# Patient Record
Sex: FEMALE | Race: WHITE | Employment: PART TIME | URBAN - METROPOLITAN AREA
[De-identification: names, ages, dates, MRNs, and addresses within clinical notes are randomized per-mention and may not be internally consistent; named-entity substitution may affect disease eponyms.]

---

## 2017-01-06 ENCOUNTER — ANESTHESIA EVENT (OUTPATIENT)
Dept: SURGERY | Age: 72
End: 2017-01-06
Payer: MEDICARE

## 2017-01-06 ENCOUNTER — ANESTHESIA (OUTPATIENT)
Dept: SURGERY | Age: 72
End: 2017-01-06
Payer: MEDICARE

## 2017-01-06 PROCEDURE — 74011250636 HC RX REV CODE- 250/636

## 2017-01-06 PROCEDURE — 74011250636 HC RX REV CODE- 250/636: Performed by: PODIATRIST

## 2017-01-06 PROCEDURE — 74011000250 HC RX REV CODE- 250

## 2017-01-06 RX ORDER — MIDAZOLAM HYDROCHLORIDE 1 MG/ML
INJECTION, SOLUTION INTRAMUSCULAR; INTRAVENOUS AS NEEDED
Status: DISCONTINUED | OUTPATIENT
Start: 2017-01-06 | End: 2017-01-06 | Stop reason: HOSPADM

## 2017-01-06 RX ORDER — LIDOCAINE HYDROCHLORIDE 20 MG/ML
INJECTION, SOLUTION EPIDURAL; INFILTRATION; INTRACAUDAL; PERINEURAL AS NEEDED
Status: DISCONTINUED | OUTPATIENT
Start: 2017-01-06 | End: 2017-01-06 | Stop reason: HOSPADM

## 2017-01-06 RX ORDER — PROPOFOL 10 MG/ML
INJECTION, EMULSION INTRAVENOUS
Status: DISCONTINUED | OUTPATIENT
Start: 2017-01-06 | End: 2017-01-06 | Stop reason: HOSPADM

## 2017-01-06 RX ORDER — PROPOFOL 10 MG/ML
INJECTION, EMULSION INTRAVENOUS AS NEEDED
Status: DISCONTINUED | OUTPATIENT
Start: 2017-01-06 | End: 2017-01-06 | Stop reason: HOSPADM

## 2017-01-06 RX ORDER — FENTANYL CITRATE 50 UG/ML
INJECTION, SOLUTION INTRAMUSCULAR; INTRAVENOUS AS NEEDED
Status: DISCONTINUED | OUTPATIENT
Start: 2017-01-06 | End: 2017-01-06 | Stop reason: HOSPADM

## 2017-01-06 RX ADMIN — CEFAZOLIN 2 G: 10 INJECTION, POWDER, FOR SOLUTION INTRAVENOUS; PARENTERAL at 11:05

## 2017-01-06 RX ADMIN — MIDAZOLAM HYDROCHLORIDE 2 MG: 1 INJECTION, SOLUTION INTRAMUSCULAR; INTRAVENOUS at 11:05

## 2017-01-06 RX ADMIN — PROPOFOL 100 MCG/KG/MIN: 10 INJECTION, EMULSION INTRAVENOUS at 11:20

## 2017-01-06 RX ADMIN — PROPOFOL 150 MG: 10 INJECTION, EMULSION INTRAVENOUS at 11:20

## 2017-01-06 RX ADMIN — FENTANYL CITRATE 50 MCG: 50 INJECTION, SOLUTION INTRAMUSCULAR; INTRAVENOUS at 11:05

## 2017-01-06 RX ADMIN — MIDAZOLAM HYDROCHLORIDE 1 MG: 1 INJECTION, SOLUTION INTRAMUSCULAR; INTRAVENOUS at 11:17

## 2017-01-06 RX ADMIN — LIDOCAINE HYDROCHLORIDE 40 MG: 20 INJECTION, SOLUTION EPIDURAL; INFILTRATION; INTRACAUDAL; PERINEURAL at 11:20

## 2017-01-06 RX ADMIN — MIDAZOLAM HYDROCHLORIDE 1 MG: 1 INJECTION, SOLUTION INTRAMUSCULAR; INTRAVENOUS at 11:15

## 2017-01-06 NOTE — ANESTHESIA PREPROCEDURE EVALUATION
Anesthetic History   No history of anesthetic complications            Review of Systems / Medical History  Patient summary reviewed, nursing notes reviewed and pertinent labs reviewed    Pulmonary    COPD               Neuro/Psych   Within defined limits           Cardiovascular  Within defined limits                Exercise tolerance: >4 METS     GI/Hepatic/Renal  Within defined limits              Endo/Other      Hypothyroidism       Other Findings   Comments: Deformity toes         Physical Exam    Airway  Mallampati: II  TM Distance: 4 - 6 cm  Neck ROM: normal range of motion   Mouth opening: Normal     Cardiovascular  Regular rate and rhythm,  S1 and S2 normal,  no murmur, click, rub, or gallop             Dental  No notable dental hx       Pulmonary  Breath sounds clear to auscultation               Abdominal  GI exam deferred       Other Findings            Anesthetic Plan    ASA: 2  Anesthesia type: general and total IV anesthesia    Monitoring Plan: BIS      Induction: Intravenous  Anesthetic plan and risks discussed with: Patient

## 2017-01-06 NOTE — ANESTHESIA POSTPROCEDURE EVALUATION
Post-Anesthesia Evaluation and Assessment    Patient: Mick Spence MRN: [id number]  SSN: [id number]    YOB: 1945  Age: 73 y.o. Sex: female       Cardiovascular Function/Vital Signs  Visit Vitals    /64    Pulse 66    Temp 36.7 °C (98 °F)    Resp 12    Ht 5' 1\" (1.549 m)    Wt 69.8 kg (153 lb 14.1 oz)    SpO2 92%    BMI 29.08 kg/m2       Patient is status post MAC, total IV anesthesia anesthesia for Procedure(s):  RIGHT FITH TOE DEROTATIONAL ARTHROPLASTY (MAC W/LOCAL). Nausea/Vomiting: None    Postoperative hydration reviewed and adequate. Pain:  Pain Scale 1: Numeric (0 - 10) (01/06/17 1045)  Pain Intensity 1: 0 (01/06/17 1045)   Managed    Neurological Status:   Neuro (WDL): Within Defined Limits (01/06/17 1025)   At baseline    Mental Status and Level of Consciousness: Arousable    Pulmonary Status:   O2 Device: Nasal cannula (01/06/17 1157)   Adequate oxygenation and airway patent    Complications related to anesthesia: None    Post-anesthesia assessment completed.  No concerns    Signed By: Yulisa Carmona MD     January 6, 2017

## 2018-11-01 ENCOUNTER — OFFICE VISIT (OUTPATIENT)
Dept: SURGERY | Age: 73
End: 2018-11-01

## 2018-11-01 VITALS
BODY MASS INDEX: 29.57 KG/M2 | WEIGHT: 156.6 LBS | DIASTOLIC BLOOD PRESSURE: 72 MMHG | HEIGHT: 61 IN | SYSTOLIC BLOOD PRESSURE: 141 MMHG | HEART RATE: 70 BPM

## 2018-11-01 DIAGNOSIS — Z86.010 HX OF COLONIC POLYP: Primary | ICD-10-CM

## 2018-11-02 RX ORDER — SODIUM CHLORIDE, SODIUM LACTATE, POTASSIUM CHLORIDE, CALCIUM CHLORIDE 600; 310; 30; 20 MG/100ML; MG/100ML; MG/100ML; MG/100ML
200 INJECTION, SOLUTION INTRAVENOUS CONTINUOUS
Status: CANCELLED | OUTPATIENT
Start: 2018-11-02 | End: 2018-11-03

## 2018-11-02 RX ORDER — BISACODYL 5 MG
5 TABLET, DELAYED RELEASE (ENTERIC COATED) ORAL
Qty: 1 TAB | Refills: 0 | Status: SHIPPED | OUTPATIENT
Start: 2018-11-02 | End: 2018-11-02

## 2018-11-02 RX ORDER — POLYETHYLENE GLYCOL 3350, SODIUM CHLORIDE, SODIUM BICARBONATE, POTASSIUM CHLORIDE 420; 11.2; 5.72; 1.48 G/4L; G/4L; G/4L; G/4L
4000 POWDER, FOR SOLUTION ORAL
Qty: 1 BOTTLE | Refills: 0 | Status: SHIPPED | OUTPATIENT
Start: 2018-11-02 | End: 2018-11-02

## 2018-11-02 NOTE — PROGRESS NOTES
[address] [facility], [facility]  Phone: [phone] Fax: [phone]                                           HISTORY AND PHYSICAL EXAM    NAME: Gerson Milligan  : 1945    Chief Complaint:   HISTORY OF COLON POLYPS    History: Gerson Milligan is a 72 y.o. female referred for colonoscopy. This is  the patient's second colonoscopy. The previous one showed polyps. The last colonoscopy was in . The bowel movements are regular and brown. She does not use any meds on a regular basis for her bowels. She denies any blood in stools or hemorrhoidal disease. Family history is negative for colon cancer or colon polyps. Past Medical History:   Diagnosis Date    COPD (chronic obstructive pulmonary disease) ([address]     does not use oxygen, \"mild COPD\" per pt, CXR states otherwise    Menopause     Thyroid disease     hypothryoid     Past Surgical History:   Procedure Laterality Date    HX CHOLECYSTECTOMY      HX COLONOSCOPY      polyps x2    HX TONSILLECTOMY      HX TUBAL LIGATION          Current Outpatient Medications   Medication Sig Dispense Refill    bisacodyl (DULCOLAX, BISACODYL,) 5 mg EC tablet Take 1 Tab by mouth now for 1 dose. 1 Tab 0    peg-electrolyte soln (GAVILYTE-N) 420 gram solution Take 4,000 mL by mouth now for 1 dose. 1 Bottle 0    budesonide-formoterol (SYMBICORT) 160-4.5 mcg/actuation HFA inhaler Take 2 Puffs by inhalation two (2) times a day.  montelukast (SINGULAIR) 10 mg tablet Take 10 mg by mouth daily.  calcium-vitamin D (OYSTER SHELL) 500 mg(1,250mg) -200 unit per tablet Take 1 Tab by mouth two (2) times daily (with meals).  TURMERIC ROOT EXTRACT PO Take  by mouth.  ascorbic acid, vitamin C, (VITAMIN C) 500 mg tablet Take  by mouth.  QUERCETIN DIHYDRATE, BULK, by Does Not Apply route.  cyanocobalamin (VITAMIN B12) 100 mcg tablet Take 100 mcg by mouth daily.       citalopram (CELEXA) 10 mg tablet Take 10 mg by mouth daily. 10    levothyroxine (SYNTHROID) 88 mcg tablet Take  by mouth Daily (before breakfast).  HYDROcodone-acetaminophen (NORCO) 5-325 mg per tablet Take 1 Tab by mouth every four (4) hours as needed for Pain. Max Daily Amount: 6 Tabs. 50 Tab 0     No Known Allergies  Social History     Socioeconomic History    Marital status: SINGLE     Spouse name: Not on file    Number of children: Not on file    Years of education: Not on file    Highest education level: Not on file   Social Needs    Financial resource strain: Not on file    Food insecurity - worry: Not on file    Food insecurity - inability: Not on file    Transportation needs - medical: Not on file   [employer] needs - non-medical: Not on file   Occupational History    Not on file   Tobacco Use    Smoking status: Former Smoker     Types: Cigarettes    Smokeless tobacco: Never Used   Substance and Sexual Activity    Alcohol use: Yes    Drug use: No    Sexual activity: Not on file   Other Topics Concern    Not on file   Social History Narrative    Not on file     Family History   Problem Relation Age of Onset    Diabetes Mother     Breast Cancer Maternal Aunt     Breast Cancer Paternal Aunt        Patient Active Problem List   Diagnosis Code    Blood in stool, josé miguel K92.1       Review of Systems:  Review of Systems   Constitutional: Negative for chills, fever, malaise/fatigue and weight loss. HENT: Negative for congestion, ear discharge, ear pain, hearing loss, nosebleeds, sore throat and tinnitus. Eyes: Negative for blurred vision, double vision, pain, discharge and redness. Respiratory: Positive for shortness of breath. Negative for cough, sputum production and wheezing. Cardiovascular: Negative for chest pain, palpitations and leg swelling. Gastrointestinal: Positive for heartburn. Negative for abdominal pain, blood in stool, constipation, diarrhea, melena, nausea and vomiting.    Genitourinary: Negative for frequency, hematuria and urgency. Musculoskeletal: Negative for back pain, joint pain and neck pain. Skin: Negative for itching and rash. Neurological: Negative for dizziness, tremors, focal weakness, seizures, weakness and headaches. Endo/Heme/Allergies: Bruises/bleeds easily. Psychiatric/Behavioral: Negative for depression and memory loss. The patient is not nervous/anxious and does not have insomnia. Physical Exam:  Visit Vitals  /72 (BP 1 Location: Left arm, BP Patient Position: Sitting)   Pulse 70   Ht 5' 1\" (1.549 m)   Wt 156 lb 9.6 oz (71 kg)   BMI 29.59 kg/m²       Physical Exam   Constitutional: She is oriented to person, place, and time. She appears well-developed and well-nourished. HENT:   Head: Normocephalic and atraumatic. Right Ear: External ear normal.   Left Ear: External ear normal.   Nose: Nose normal.   Mouth/Throat: Oropharynx is clear and moist. No oropharyngeal exudate. Eyes: Conjunctivae and EOM are normal. Pupils are equal, round, and reactive to light. Right eye exhibits no discharge. Left eye exhibits no discharge. No scleral icterus. Neck: Neck supple. No tracheal deviation present. No thyromegaly present. Cardiovascular: Normal rate, regular rhythm and normal heart sounds. Exam reveals no gallop and no friction rub. No murmur heard. Pulmonary/Chest: Effort normal and breath sounds normal.   Abdominal: Soft. Bowel sounds are normal. She exhibits no mass. There is no tenderness. Musculoskeletal: Normal range of motion. Lymphadenopathy:     She has no cervical adenopathy. Neurological: She is alert and oriented to person, place, and time. Skin: Skin is warm and dry. No rash noted. No erythema. Psychiatric: She has a normal mood and affect.  Her behavior is normal. Judgment and thought content normal.       Impression:  History of colon polyps    Plan:  Colonoscopy on 16/2/38  Risks and complications were explained to patient and they voiced understanding.     Karon Arias M.D.

## 2018-12-03 PROBLEM — Z86.010 HX OF COLONIC POLYP: Status: ACTIVE | Noted: 2018-12-03

## 2018-12-03 PROBLEM — K57.30 DIVERTICULA OF COLON: Status: ACTIVE | Noted: 2018-12-03

## 2018-12-03 PROBLEM — K63.5 COLON POLYP: Status: ACTIVE | Noted: 2018-12-03

## 2018-12-03 LAB — COLONOSCOPY, EXTERNAL: NORMAL

## 2018-12-11 ENCOUNTER — OFFICE VISIT (OUTPATIENT)
Dept: SURGERY | Age: 73
End: 2018-12-11

## 2018-12-11 VITALS
BODY MASS INDEX: 29.45 KG/M2 | WEIGHT: 156 LBS | HEART RATE: 63 BPM | SYSTOLIC BLOOD PRESSURE: 154 MMHG | HEIGHT: 61 IN | DIASTOLIC BLOOD PRESSURE: 67 MMHG

## 2018-12-11 DIAGNOSIS — K63.5 SERRATED POLYP OF COLON: ICD-10-CM

## 2018-12-11 DIAGNOSIS — D12.4 ADENOMATOUS POLYP OF DESCENDING COLON: Primary | ICD-10-CM

## 2018-12-11 NOTE — PROGRESS NOTES
Follow up from colonoscopy  No c/o      Pathology: serrated sessile Adenomatous polyp  Diverticula    Visit Vitals  /67 (BP 1 Location: Left arm, BP Patient Position: Sitting)   Pulse 63   Ht 5' 1\" (1.549 m)   Wt 156 lb (70.8 kg)   BMI 29.48 kg/m²     WDWN patient in no acute distress. Pathophysiology and malignancy potential discussed. Pictures viewed and questions answered. High fiber diet suggested. Time spent 15 minutes in discussion, over half the visit was in care coordination and patient counseling. Repeat scope in 3 years because of size and serrated polyp.

## 2018-12-11 NOTE — LETTER
12/11/2018 4:55 PM 
 
Patient:  Francois Strauss YOB: 1945 Date of Visit: 12/11/2018 Dear Stan Maza MD 
[address] [facility] [address] [id number] V[address] Facsimile: [phone] 
 : Thank you for referring Ms. Jackelyn Vega to me for evaluation/treatment. Below are the relevant portions of my assessment and plan of care. Follow up from colonoscopy No c/o Pathology: serrated sessile Adenomatous polyp Diverticula Visit Vitals /67 (BP 1 Location: Left arm, BP Patient Position: Sitting) Pulse 63 Ht 5' 1\" (1.549 m) Wt 156 lb (70.8 kg) BMI 29.48 kg/m² WDWN patient in no acute distress. Pathophysiology and malignancy potential discussed. Pictures viewed and questions answered. High fiber diet suggested. Time spent 15 minutes in discussion, over half the visit was in care coordination and patient counseling. Repeat scope in 3 years because of size and serrated polyp. If you have questions, please do not hesitate to call me. I look forward to following Ms. Britt Harrison along with you.  
 
 
 
Sincerely, 
 
 
Carol Up MD

## 2020-05-08 ENCOUNTER — HOSPITAL ENCOUNTER (OUTPATIENT)
Dept: CT IMAGING | Age: 75
Discharge: HOME | End: 2020-05-08
Attending: INTERNAL MEDICINE
Payer: MEDICARE

## 2020-05-08 DIAGNOSIS — J84.9 ILD (INTERSTITIAL LUNG DISEASE) (HCC): ICD-10-CM

## 2020-05-08 PROCEDURE — 71250 CT THORAX DX C-: CPT

## 2020-05-21 ENCOUNTER — HOSPITAL ENCOUNTER (OUTPATIENT)
Dept: NON INVASIVE DIAGNOSTICS | Age: 75
Discharge: HOME | End: 2020-05-21
Attending: INTERNAL MEDICINE
Payer: MEDICARE

## 2020-05-21 VITALS
SYSTOLIC BLOOD PRESSURE: 114 MMHG | DIASTOLIC BLOOD PRESSURE: 61 MMHG | WEIGHT: 154.98 LBS | HEIGHT: 61 IN | BODY MASS INDEX: 29.26 KG/M2

## 2020-05-21 DIAGNOSIS — J84.9 ILD (INTERSTITIAL LUNG DISEASE) (HCC): ICD-10-CM

## 2020-05-21 LAB
AV VELOCITY RATIO: 0.9
ECHO AV AREA PEAK VELOCITY: 3.5 CM2
ECHO AV PEAK GRADIENT: 4.9 MMHG
ECHO AV PEAK VELOCITY: 110.73 CM/S
ECHO EST RA PRESSURE: 10 MMHG
ECHO LA AREA 4C: 11.4 CM2
ECHO LA VOL 4C: 27.52 ML (ref 22–52)
ECHO LA VOLUME INDEX A4C: 16.24 ML/M2 (ref 16–28)
ECHO LV E' LATERAL VELOCITY: 7.47 CM/S
ECHO LV E' SEPTAL VELOCITY: 5.68 CM/S
ECHO LV EDV A4C: 52.4 ML
ECHO LV EDV INDEX A4C: 30.9 ML/M2
ECHO LV EJECTION FRACTION A4C: 77 %
ECHO LV ESV A4C: 12 ML
ECHO LV ESV INDEX A4C: 7.1 ML/M2
ECHO LV INTERNAL DIMENSION DIASTOLIC MMODE: 5.77 CM
ECHO LV INTERNAL DIMENSION SYSTOLIC MMODE: 3.6 CM
ECHO LV IVSD MMODE: 1.15 CM
ECHO LV IVSS MMODE: 1.79 CM
ECHO LV POSTERIOR WALL DIASTOLIC MMODE: 0.89 CM
ECHO LV POSTERIOR WALL SYSTOLIC MMODE: 1.76 CM
ECHO LVOT DIAM: 2.21 CM
ECHO LVOT PEAK GRADIENT: 4 MMHG
ECHO LVOT PEAK VELOCITY: 99.88 CM/S
ECHO MV A VELOCITY: 101.65 CM/S
ECHO MV E DECELERATION TIME (DT): 227.1 MS
ECHO MV E VELOCITY: 67.65 CM/S
ECHO MV E/A RATIO: 0.67
ECHO MV E/E' LATERAL: 9.06
ECHO MV E/E' RATIO (AVERAGED): 10.48
ECHO MV E/E' SEPTAL: 11.91
ECHO MV REGURGITANT PEAK GRADIENT: 13 MMHG
ECHO MV REGURGITANT PEAK VELOCITY: 180.2 CM/S
ECHO PULMONARY ARTERY SYSTOLIC PRESSURE (PASP): 46.3 MMHG
ECHO PV MAX VELOCITY: 74.97 CM/S
ECHO PV PEAK GRADIENT: 2.2 MMHG
ECHO RIGHT VENTRICULAR SYSTOLIC PRESSURE (RVSP): 46.3 MMHG
ECHO RV INTERNAL DIMENSION: 2.76 CM
ECHO TV A WAVE: 67.74 CM/S
ECHO TV E WAVE: 61.48 CM/S
ECHO TV EROA: 1.1
ECHO TV REGURGITANT MAX VELOCITY: 301.44 CM/S
ECHO TV REGURGITANT PEAK GRADIENT: 36.3 MMHG
LVFS: 37.5 %
MV DEC SLOPE: 2.98

## 2020-05-21 PROCEDURE — 93306 TTE W/DOPPLER COMPLETE: CPT

## 2021-04-15 RX ORDER — OMEPRAZOLE 40 MG/1
40 CAPSULE, DELAYED RELEASE ORAL DAILY
COMMUNITY

## 2021-04-15 RX ORDER — MAGNESIUM 200 MG
1000 TABLET ORAL DAILY
COMMUNITY

## 2021-04-15 RX ORDER — ASCORBIC ACID/MULTIVIT-MIN 1000 MG
1000 EFFERVESCENT POWDER IN PACKET ORAL DAILY
COMMUNITY

## 2021-04-15 RX ORDER — MULTIVIT WITH IRON,MINERALS
1 TABLET ORAL DAILY
COMMUNITY

## 2021-04-15 RX ORDER — MINERAL OIL
180 ENEMA (ML) RECTAL DAILY
COMMUNITY

## 2021-04-15 RX ORDER — ACETYLCYSTEINE 600 MG
600 CAPSULE ORAL DAILY
COMMUNITY

## 2021-04-15 RX ORDER — NINTEDANIB 150 MG/1
150 CAPSULE ORAL 2 TIMES DAILY
COMMUNITY

## 2021-04-15 RX ORDER — CITALOPRAM 20 MG/1
20 TABLET, FILM COATED ORAL DAILY
Status: ON HOLD | COMMUNITY
End: 2021-09-23

## 2021-04-18 ENCOUNTER — HOSPITAL ENCOUNTER (OUTPATIENT)
Dept: PREADMISSION TESTING | Age: 76
Discharge: HOME | End: 2021-04-18
Payer: MEDICARE

## 2021-04-18 ENCOUNTER — TRANSCRIBE ORDER (OUTPATIENT)
Dept: REGISTRATION | Age: 76
End: 2021-04-18

## 2021-04-18 DIAGNOSIS — Z01.812 PRE-PROCEDURE LAB EXAM: ICD-10-CM

## 2021-04-18 DIAGNOSIS — Z01.812 PRE-PROCEDURE LAB EXAM: Primary | ICD-10-CM

## 2021-04-18 PROCEDURE — U0003 INFECTIOUS AGENT DETECTION BY NUCLEIC ACID (DNA OR RNA); SEVERE ACUTE RESPIRATORY SYNDROME CORONAVIRUS 2 (SARS-COV-2) (CORONAVIRUS DISEASE [COVID-19]), AMPLIFIED PROBE TECHNIQUE, MAKING USE OF HIGH THROUGHPUT TECHNOLOGIES AS DESCRIBED BY CMS-2020-01-R: HCPCS

## 2021-04-19 LAB — SARS-COV-2, COV2NT: NOT DETECTED

## 2021-04-22 ENCOUNTER — HOSPITAL ENCOUNTER (OUTPATIENT)
Age: 76
Setting detail: OUTPATIENT SURGERY
Discharge: HOME | End: 2021-04-22
Attending: SPECIALIST | Admitting: SPECIALIST
Payer: MEDICARE

## 2021-04-22 ENCOUNTER — ANESTHESIA EVENT (OUTPATIENT)
Dept: ENDOSCOPY | Age: 76
End: 2021-04-22
Payer: MEDICARE

## 2021-04-22 ENCOUNTER — ANESTHESIA (OUTPATIENT)
Dept: ENDOSCOPY | Age: 76
End: 2021-04-22
Payer: MEDICARE

## 2021-04-22 VITALS
TEMPERATURE: 97.3 F | BODY MASS INDEX: 29.27 KG/M2 | HEIGHT: 61 IN | RESPIRATION RATE: 18 BRPM | SYSTOLIC BLOOD PRESSURE: 146 MMHG | HEART RATE: 62 BPM | DIASTOLIC BLOOD PRESSURE: 60 MMHG | WEIGHT: 155 LBS | OXYGEN SATURATION: 91 %

## 2021-04-22 PROCEDURE — 74011000250 HC RX REV CODE- 250: Performed by: NURSE ANESTHETIST, CERTIFIED REGISTERED

## 2021-04-22 PROCEDURE — 74011250636 HC RX REV CODE- 250/636: Performed by: NURSE ANESTHETIST, CERTIFIED REGISTERED

## 2021-04-22 PROCEDURE — 2709999900 HC NON-CHARGEABLE SUPPLY: Performed by: SPECIALIST

## 2021-04-22 PROCEDURE — 76060000032 HC ANESTHESIA 0.5 TO 1 HR: Performed by: SPECIALIST

## 2021-04-22 PROCEDURE — 74011250637 HC RX REV CODE- 250/637: Performed by: SPECIALIST

## 2021-04-22 PROCEDURE — 76040000007: Performed by: SPECIALIST

## 2021-04-22 RX ORDER — LIDOCAINE HYDROCHLORIDE 20 MG/ML
INJECTION, SOLUTION EPIDURAL; INFILTRATION; INTRACAUDAL; PERINEURAL AS NEEDED
Status: DISCONTINUED | OUTPATIENT
Start: 2021-04-22 | End: 2021-04-22 | Stop reason: HOSPADM

## 2021-04-22 RX ORDER — ATROPINE SULFATE 0.1 MG/ML
0.5 INJECTION INTRAVENOUS
Status: DISCONTINUED | OUTPATIENT
Start: 2021-04-22 | End: 2021-04-22 | Stop reason: HOSPADM

## 2021-04-22 RX ORDER — PHENYLEPHRINE HCL IN 0.9% NACL 0.4MG/10ML
SYRINGE (ML) INTRAVENOUS AS NEEDED
Status: DISCONTINUED | OUTPATIENT
Start: 2021-04-22 | End: 2021-04-22 | Stop reason: HOSPADM

## 2021-04-22 RX ORDER — NALOXONE HYDROCHLORIDE 0.4 MG/ML
0.4 INJECTION, SOLUTION INTRAMUSCULAR; INTRAVENOUS; SUBCUTANEOUS
Status: DISCONTINUED | OUTPATIENT
Start: 2021-04-22 | End: 2021-04-22 | Stop reason: HOSPADM

## 2021-04-22 RX ORDER — SODIUM CHLORIDE 9 MG/ML
50 INJECTION, SOLUTION INTRAVENOUS CONTINUOUS
Status: DISCONTINUED | OUTPATIENT
Start: 2021-04-22 | End: 2021-04-22 | Stop reason: HOSPADM

## 2021-04-22 RX ORDER — FLUMAZENIL 0.1 MG/ML
0.2 INJECTION INTRAVENOUS
Status: DISCONTINUED | OUTPATIENT
Start: 2021-04-22 | End: 2021-04-22 | Stop reason: HOSPADM

## 2021-04-22 RX ORDER — EPINEPHRINE 0.1 MG/ML
1 INJECTION INTRACARDIAC; INTRAVENOUS
Status: DISCONTINUED | OUTPATIENT
Start: 2021-04-22 | End: 2021-04-22 | Stop reason: HOSPADM

## 2021-04-22 RX ORDER — SODIUM CHLORIDE 9 MG/ML
INJECTION, SOLUTION INTRAVENOUS
Status: DISCONTINUED | OUTPATIENT
Start: 2021-04-22 | End: 2021-04-22 | Stop reason: HOSPADM

## 2021-04-22 RX ORDER — DEXTROMETHORPHAN/PSEUDOEPHED 2.5-7.5/.8
1.2 DROPS ORAL
Status: DISCONTINUED | OUTPATIENT
Start: 2021-04-22 | End: 2021-04-22 | Stop reason: HOSPADM

## 2021-04-22 RX ORDER — PROPOFOL 10 MG/ML
INJECTION, EMULSION INTRAVENOUS AS NEEDED
Status: DISCONTINUED | OUTPATIENT
Start: 2021-04-22 | End: 2021-04-22 | Stop reason: HOSPADM

## 2021-04-22 RX ORDER — SODIUM CHLORIDE 0.9 % (FLUSH) 0.9 %
5-40 SYRINGE (ML) INJECTION AS NEEDED
Status: DISCONTINUED | OUTPATIENT
Start: 2021-04-22 | End: 2021-04-22 | Stop reason: HOSPADM

## 2021-04-22 RX ORDER — SODIUM CHLORIDE 0.9 % (FLUSH) 0.9 %
5-40 SYRINGE (ML) INJECTION EVERY 8 HOURS
Status: DISCONTINUED | OUTPATIENT
Start: 2021-04-22 | End: 2021-04-22 | Stop reason: HOSPADM

## 2021-04-22 RX ADMIN — PHENYLEPHRINE HYDROCHLORIDE 80 MCG: 10 INJECTION INTRAVENOUS at 13:18

## 2021-04-22 RX ADMIN — PROPOFOL 20 MG: 10 INJECTION, EMULSION INTRAVENOUS at 13:18

## 2021-04-22 RX ADMIN — PROPOFOL 30 MG: 10 INJECTION, EMULSION INTRAVENOUS at 13:14

## 2021-04-22 RX ADMIN — PROPOFOL 30 MG: 10 INJECTION, EMULSION INTRAVENOUS at 13:10

## 2021-04-22 RX ADMIN — PROPOFOL 100 MG: 10 INJECTION, EMULSION INTRAVENOUS at 13:07

## 2021-04-22 RX ADMIN — PHENYLEPHRINE HYDROCHLORIDE 80 MCG: 10 INJECTION INTRAVENOUS at 13:13

## 2021-04-22 RX ADMIN — PROPOFOL 40 MG: 10 INJECTION, EMULSION INTRAVENOUS at 13:23

## 2021-04-22 RX ADMIN — PROPOFOL 30 MG: 10 INJECTION, EMULSION INTRAVENOUS at 13:16

## 2021-04-22 RX ADMIN — LIDOCAINE HYDROCHLORIDE 60 MG: 20 INJECTION, SOLUTION EPIDURAL; INFILTRATION; INTRACAUDAL; PERINEURAL at 13:07

## 2021-04-22 RX ADMIN — PHENYLEPHRINE HYDROCHLORIDE 80 MCG: 10 INJECTION INTRAVENOUS at 13:16

## 2021-04-22 RX ADMIN — SODIUM CHLORIDE: 900 INJECTION, SOLUTION INTRAVENOUS at 13:00

## 2021-04-22 RX ADMIN — PROPOFOL 20 MG: 10 INJECTION, EMULSION INTRAVENOUS at 13:20

## 2021-04-22 NOTE — PERIOP NOTES

## 2021-04-22 NOTE — PROCEDURES
[address], [address] [place] [address]                 Colonoscopy Procedure Note    Indications:   See Preoperative Diagnosis above  Referring Physician: Molly Levy MD  Anesthesia/Sedation: MAC anesthesia Propofol  Endoscopist:  Dr. Wolfgang Abdi  Assistant:  Endoscopy Technician-1: Sivakumar Dowd  Endoscopy RN-1: Maty Higuera RN  Preoperative diagnosis: Screening for malignant neoplasm of colon [Z12.11]  Idiopathic pulmonary fibrosis ([address] [J84.112]  Gastroesophageal reflux disease, unspecified whether esophagitis present [K21.9]  Postoperative diagnosis: diverticulosis    Procedure in Detail:  Informed consent was obtained for the procedure, including sedation. Risks of perforation, hemorrhage, adverse drug reaction, and aspiration were discussed. The patient was placed in the left lateral decubitus position. Based on the pre-procedure assessment, including review of the patient's medical history, medications, allergies, and review of systems, she had been deemed to be an appropriate candidate for moderate sedation; she was therefore sedated with the medications listed above. The patient was monitored continuously with ECG tracing, pulse oximetry, blood pressure monitoring, and direct observations. A rectal examination was performed. The [id number] was inserted into the rectum and advanced under direct vision to the cecum, which was identified by the ileocecal valve and appendiceal orifice. The quality of the colonic preparation was good. A careful inspection was made as the colonoscope was withdrawn, including a retroflexed view of the rectum; findings and interventions are described below. Appropriate photodocumentation was obtained. Findings:  Rectum: normal  Sigmoid: severe diverticulosis;   Descending Colon: severe diverticulosis;  Transverse Colon: normal  Ascending Colon: normal  Cecum: normal      Specimens:    none    EBL: None    Complications: None; patient tolerated the procedure well. Recommendations:     - Repeat colonoscopy in 5 years. - High fiber diet.         Signed By: Petrona Alonzo MD                        April 22, 2021

## 2021-04-22 NOTE — H&P
Colonoscopy History and Physical      The patient was seen and examined. Date of last colonoscopy: 2018, Polyps  Yes      The airway was assessed and documented. The problem list, past medical history, and medications were reviewed.      Patient Active Problem List   Diagnosis Code    Blood in stool, josé miguel K92.1    Hx of colonic polyp Z86.010    Colon polyp K63.5    Diverticula of colon K57.30     Social History     Socioeconomic History    Marital status: SINGLE     Spouse name: Not on file    Number of children: Not on file    Years of education: Not on file    Highest education level: Not on file   Occupational History    Not on file   Social Needs    Financial resource strain: Not on file    Food insecurity     Worry: Not on file     Inability: Not on file    Transportation needs     Medical: Not on file     Non-medical: Not on file   Tobacco Use    Smoking status: Former Smoker     Types: Cigarettes    Smokeless tobacco: Never Used    Tobacco comment: 10-15 yrs ago   Substance and Sexual Activity    Alcohol use: Yes     Comment: 1-2 glasses of wine at night    Drug use: No    Sexual activity: Not on file   Lifestyle    Physical activity     Days per week: Not on file     Minutes per session: Not on file    Stress: Not on file   Relationships    Social connections     Talks on phone: Not on file     Gets together: Not on file     Attends Gnosticist service: Not on file     Active member of club or organization: Not on file     Attends meetings of clubs or organizations: Not on file     Relationship status: Not on file    Intimate partner violence     Fear of current or ex partner: Not on file     Emotionally abused: Not on file     Physically abused: Not on file     Forced sexual activity: Not on file   Other Topics Concern    Not on file   Social History Narrative    Not on file     Past Medical History:   Diagnosis Date    Adverse effect of anesthesia     vocal cord swelling with bronchoscopy - was given lidocaine for bronchoscopy - unsure if lidocaine this caused the swelling/\"put me out the second time\" and did not use lidocaine and there no problem    COPD (chronic obstructive pulmonary disease) ([facility])     does not use oxygen, \"mild COPD\" per pt, CXR states otherwise    Ill-defined condition     uses 2 liters of oxygen    Ill-defined condition     pulmonary fibrosis    Menopause     Thyroid disease     hypothryoid         Prior to Admission Medications   Prescriptions Last Dose Informant Patient Reported? Taking? Ascorbic Acid-Multivits-Min (Emergen-C) 1,000 mg pwep 4/15/2021 at Unknown time  Yes Yes   Sig: Take 1,000 mg by mouth daily. COLLAGEN 4/15/2021 at Unknown time  Yes Yes   Sig: Take  by mouth. Powder. Takes occasionally. Mixes with emergen C occasionally. GREEN TEA EXTRACT PO 4/15/2021 at Unknown time  Yes Yes   Sig: Take 1,000 mg by mouth daily. MULTIVITAMIN PO 4/15/2021 at Unknown time  Yes Yes   Sig: Take  by mouth. Takes 2 packets with 3 pills each once a day. Has vitamin D3, turmeric, and omega 3. OTHER 4/15/2021 at Unknown time  Yes Yes   Sig: Cordyceps for respiratory health and immune system. Takes 1000 mg once daily. acetylcysteine (NAC) 600 mg cap capsule 4/15/2021 at Unknown time  Yes Yes   Sig: Take 600 mg by mouth daily. citalopram (CELEXA) 20 mg tablet 4/15/2021 at Unknown time  Yes Yes   Sig: Take 20 mg by mouth daily. cod liver oil cap 4/15/2021 at Unknown time  Yes Yes   Sig: Take 1 Cap by mouth daily. [place] cod liver oil   cyanocobalamin (VITAMIN B-12) 1,000 mcg sublingual tablet 4/15/2021 at Unknown time  Yes Yes   Sig: Take 1,000 mcg by mouth daily. famotidine (PEPCID) 20 mg tablet 4/15/2021 at Unknown time  Yes Yes   Sig: Take 20 mg by mouth every morning. fexofenadine (ALLEGRA) 180 mg tablet 4/15/2021 at Unknown time  Yes Yes   Sig: Take 180 mg by mouth daily.    levothyroxine (SYNTHROID) 88 mcg tablet 4/21/2021 at Unknown time Yes Yes   Sig: Take 88 mcg by mouth Daily (before breakfast). montelukast (SINGULAIR) 10 mg tablet 4/15/2021 at Unknown time  Yes Yes   Sig: Take 10 mg by mouth daily. nintedanib (Ofev) 150 mg cap 4/15/2021 at Unknown time  Yes Yes   Sig: Take 150 mg by mouth daily. omeprazole (PRILOSEC) 40 mg capsule 4/15/2021 at Unknown time  Yes Yes   Sig: Take 40 mg by mouth daily. Takes one po in the afternoon. tiotropium bromide (Spiriva Respimat) 2.5 mcg/actuation inhaler 4/15/2021 at Unknown time  Yes Yes   Sig: Take 2 Puffs by inhalation daily. zinc 50 mg tab tablet 4/15/2021 at Unknown time  Yes Yes   Sig: Take 50 mg by mouth daily. Facility-Administered Medications: None       The patient was seen and examined in the endoscopy suite. The airway was assessed and documented. The problem list and medications were reviewed. Chief complaint, history of present illness, and review of systems and Past medical History are positive for: history of polyps    The heart, lungs and mental status were satisfactory for the administration of sedation and for the procedure. I discussed with the patient the objectives, risks, consequences and alternatives to the procedure. The patient was counseled at length about the risks of marissa Covid-19 in the lydia-operative and post-operative states including the recovery window of their procedure. The patient was made aware that marissa Covid-19 after a surgical procedure may worsen their prognosis for recovering from the virus and lend to a higher morbidity and or mortality risk. The patient was given the options of postponing their procedure. All of the risks, benefits, and alternatives were discussed. The patient does  wish to proceed with the procedure.     Plan: Endoscopic procedure with sedation     Cecile Manzo MD   4/22/2021  1:07 PM

## 2021-04-22 NOTE — ANESTHESIA PREPROCEDURE EVALUATION
Anesthetic History               Review of Systems / Medical History  Patient summary reviewed, nursing notes reviewed and pertinent labs reviewed    Pulmonary    COPD: moderate          Pertinent negatives: Smoker: former.   Comments: Pulmonary fibrosis  Home O2 at night 3 l/m   Neuro/Psych              Cardiovascular                  Exercise tolerance: >4 METS  Comments: Echo of 2016 shows PHTN, mild tricuspid regurg   GI/Hepatic/Renal     GERD: well controlled           Endo/Other      Hypothyroidism: well controlled       Other Findings              Physical Exam    Airway  Mallampati: II  TM Distance: 4 - 6 cm  Neck ROM: normal range of motion   Mouth opening: Normal     Cardiovascular  Regular rate and rhythm,  S1 and S2 normal,  no murmur, click, rub, or gallop  Rhythm: regular           Dental  No notable dental hx       Pulmonary  Breath sounds clear to auscultation               Abdominal  GI exam deferred       Other Findings            Anesthetic Plan    ASA: 3  Anesthesia type: MAC          Induction: Intravenous  Anesthetic plan and risks discussed with: Patient

## 2021-04-22 NOTE — ANESTHESIA POSTPROCEDURE EVALUATION
Post-Anesthesia Evaluation and Assessment    Patient: Ayush Upton MRN: [id number]  SSN: [id number]    YOB: 1945  Age: 70 y.o. Sex: female      I have evaluated the patient and they are stable and ready for discharge from the PACU. Cardiovascular Function/Vital Signs  Visit Vitals  /60   Pulse 74   Temp 36.3 °C (97.3 °F)   Resp 19   Ht 5' 1\" (1.549 m)   Wt 70.3 kg (155 lb)   SpO2 95%   BMI 29.29 kg/m²       Patient is status post MAC anesthesia for Procedure(s):  COLONOSCOPY   :-.    Nausea/Vomiting: None    Postoperative hydration reviewed and adequate. Pain:  Pain Scale 1: Numeric (0 - 10) (04/22/21 1345)  Pain Intensity 1: 0 (04/22/21 1345)   Managed    Neurological Status: At baseline    Mental Status, Level of Consciousness: Alert and  oriented to person, place, and time    Pulmonary Status:   O2 Device: Nasal cannula (04/22/21 1345)   Adequate oxygenation and airway patent    Complications related to anesthesia: None    Post-anesthesia assessment completed. No concerns    Signed By: Raegan Chamberlain MD     April 22, 2021              Procedure(s):  COLONOSCOPY   :-.    MAC    <BSHSIANPOST>    INITIAL Post-op Vital signs:   Vitals Value Taken Time   /60 04/22/21 1345   Temp 36.3 °C (97.3 °F) 04/22/21 1342   Pulse 78 04/22/21 1348   Resp 23 04/22/21 1348   SpO2 93 % 04/22/21 1348   Vitals shown include unvalidated device data.

## 2021-04-22 NOTE — ROUTINE PROCESS
Austin Masters  1945  [id number]    Situation:  Verbal report received from: Sher Mayo  Procedure: Procedure(s):  COLONOSCOPY   :-    Background:    Preoperative diagnosis: Screening for malignant neoplasm of colon [Z12.11]  Idiopathic pulmonary fibrosis ([facility] [J84.112]  Gastroesophageal reflux disease, unspecified whether esophagitis present [K21.9]  Postoperative diagnosis: diverticulosis    :  Dr. Dave Marley  Assistant(s): Endoscopy Technician-1: Nata Yang  Endoscopy RN-1: Abena Potts RN    Specimens: no  H.  Pylori  no    Assessment:  Intra-procedure medications     Anesthesia gave intra-procedure sedation and medications, see anesthesia flow sheet     Intravenous fluids: NS@ KVO     Vital signs stable     Abdominal assessment: round and soft     Recommendation:  Discharge patient per MD order  Family or Friend -yes  Permission to share finding with friend-yes

## 2021-04-22 NOTE — DISCHARGE INSTRUCTIONS
Truman Sosa  [id number]  1945    COLON DISCHARGE INSTRUCTIONS  Discomfort:  Redness at IV site- apply warm compress to area; if redness or soreness persist- contact your physician  There may be a slight amount of blood passed from the rectum  Gaseous discomfort- walking, belching will help relieve any discomfort    DIET:   Regular diet. - however -  remember your colon is empty and a heavy meal will produce gas. Avoid these foods:  vegetables, fried / greasy foods, carbonated drinks for today. You may not drink alcoholic beverages for at least 12 hours    MEDICATIONS:   Regarding Aspirin or Nonsteroidal medications, please see below. ACTIVITY:  You may resume your normal daily activities it is recommended that you spend the remainder of the day resting -  avoid any strenuous activity. You may not operate a vehicle for 12 hours  You may not engage in an occupation involving machinery or appliances for rest of today  Avoid making any critical decisions for at least 24 hour    CALL M.D. ANY SIGN OF:  Increasing pain, nausea, vomiting  Abdominal distension (swelling)  New increased bleeding (oral or rectal)  Fever (chills)  Pain in chest area  Bloody discharge from nose or mouth  Shortness of breath    You may not  take any Advil, Aspirin, Ibuprofen, Motrin, Aleve, or Goodys for 10 days, ONLY  Tylenol as needed for pain. Post procedure diagnosis: diverticulosis      Follow-up Instructions:   Call Dr. Vj Dorsey  Results of procedure / biopsy in 10 days  Telephone #  [phone]      DISCHARGE SUMMARY from Nurse    The following personal items collected during your admission are returned to you:   Dental Appliance: Dental Appliances: None  Vision: Visual Aid: None  Hearing Aid:    Jewelry:    Clothing:    Other Valuables:    Valuables sent to safe:      Learning About Coronavirus (COVID-19)  Coronavirus (COVID-19): Overview  What is coronavirus ([id number])?   The coronavirus disease (COVID-19) is caused by a virus. It is an illness that was first found in [place], [place], in December 2019. It has since spread worldwide. The virus can cause fever, cough, and trouble breathing. In severe cases, it can cause pneumonia and make it hard to breathe without help. It can cause death. Coronaviruses are a large group of viruses. They cause the common cold. They also cause more serious illnesses like Middle East respiratory syndrome (MERS) and severe acute respiratory syndrome (SARS). COVID-19 is caused by a novel coronavirus. That means it's a new type that has not been seen in people before. This virus spreads person-to-person through droplets from coughing and sneezing. It can also spread when you are close to someone who is infected. And it can spread when you touch something that has the virus on it, such as a doorknob or a tabletop. What can you do to protect yourself from coronavirus (COVID-19)? The best way to protect yourself from getting sick is to:  · Avoid areas where there is an outbreak. · Avoid contact with people who may be infected. · Wash your hands often with soap or alcohol-based hand sanitizers. · Avoid crowds and try to stay at least 6 feet away from other people. · Wash your hands often, especially after you cough or sneeze. Use soap and water, and scrub for at least 20 seconds. If soap and water aren't available, use an alcohol-based hand . · Avoid touching your mouth, nose, and eyes. What can you do to avoid spreading the virus to others? To help avoid spreading the virus to others:  · Cover your mouth with a tissue when you cough or sneeze. Then throw the tissue in the trash. · Use a disinfectant to clean things that you touch often. · Stay home if you are sick or have been exposed to the virus. Don't go to school, work, or public areas. And don't use public transportation. · If you are sick:  ? Leave your home only if you need to get medical care.  But call the doctor's office first so they know you're coming. And wear a face mask, if you have one.  ? If you have a face mask, wear it whenever you're around other people. It can help stop the spread of the virus when you cough or sneeze. ? Clean and disinfect your home every day. Use household  and disinfectant wipes or sprays. Take special care to clean things that you grab with your hands. These include doorknobs, remote controls, phones, and handles on your refrigerator and microwave. And don't forget countertops, tabletops, bathrooms, and computer keyboards. When to call for help  Call 911 anytime you think you may need emergency care. For example, call if:  · You have severe trouble breathing. (You can't talk at all.)  · You have constant chest pain or pressure. · You are severely dizzy or lightheaded. · You are confused or can't think clearly. · Your face and lips have a blue color. · You pass out (lose consciousness) or are very hard to wake up. Call your doctor now if you develop symptoms such as:  · Shortness of breath. · Fever. · Cough. If you need to get care, call ahead to the doctor's office for instructions before you go. Make sure you wear a face mask, if you have one, to prevent exposing other people to the virus. Where can you get the latest information? The following health organizations are tracking and studying this virus. Their websites contain the most up-to-date information. Deepali Elkesolomonleisa also learn what to do if you think you may have been exposed to the virus. · U.S. Centers for Disease Control and Prevention (CDC): The CDC provides updated news about the disease and travel advice. The website also tells you how to prevent the spread of infection. www.cdc.gov  · World Health Organization [facility]): WHO offers information about the virus outbreaks. WHO also has travel advice. www.who.int  Current as of: April 1, 2020               Content Version: 12.4  © 6119-6682 [employer].    Care instructions adapted under license by your healthcare professional. If you have questions about a medical condition or this instruction, always ask your healthcare professional. [place] [address] any warranty or liability for your use of this information.

## 2021-09-22 ENCOUNTER — HOSPITAL ENCOUNTER (INPATIENT)
Age: 76
LOS: 4 days | Discharge: HOME | DRG: 189 | End: 2021-09-26
Attending: EMERGENCY MEDICINE | Admitting: FAMILY MEDICINE
Payer: MEDICARE

## 2021-09-22 ENCOUNTER — APPOINTMENT (OUTPATIENT)
Dept: GENERAL RADIOLOGY | Age: 76
DRG: 189 | End: 2021-09-22
Attending: EMERGENCY MEDICINE
Payer: MEDICARE

## 2021-09-22 ENCOUNTER — APPOINTMENT (OUTPATIENT)
Dept: CT IMAGING | Age: 76
DRG: 189 | End: 2021-09-22
Attending: EMERGENCY MEDICINE
Payer: MEDICARE

## 2021-09-22 DIAGNOSIS — J44.1 COPD EXACERBATION (HCC): ICD-10-CM

## 2021-09-22 DIAGNOSIS — R06.02 SHORTNESS OF BREATH: Primary | ICD-10-CM

## 2021-09-22 DIAGNOSIS — R05.9 COUGH: ICD-10-CM

## 2021-09-22 LAB
ALBUMIN SERPL-MCNC: 3.6 G/DL (ref 3.5–5)
ALBUMIN/GLOB SERPL: 0.9 {RATIO} (ref 1.1–2.2)
ALP SERPL-CCNC: 125 U/L (ref 45–117)
ALT SERPL-CCNC: 28 U/L (ref 12–78)
ANION GAP SERPL CALC-SCNC: 12 MMOL/L (ref 5–15)
APPEARANCE UR: ABNORMAL
AST SERPL-CCNC: 26 U/L (ref 15–37)
B PERT DNA SPEC QL NAA+PROBE: NOT DETECTED
BACTERIA URNS QL MICRO: NEGATIVE /HPF
BASOPHILS # BLD: 0 K/UL (ref 0–0.1)
BASOPHILS NFR BLD: 0 % (ref 0–1)
BILIRUB SERPL-MCNC: 0.4 MG/DL (ref 0.2–1)
BILIRUB UR QL: NEGATIVE
BORDETELLA PARAPERTUSSIS PCR, BORPAR: NOT DETECTED
BUN SERPL-MCNC: 24 MG/DL (ref 6–20)
BUN/CREAT SERPL: 27 (ref 12–20)
C PNEUM DNA SPEC QL NAA+PROBE: NOT DETECTED
CALCIUM SERPL-MCNC: 9.3 MG/DL (ref 8.5–10.1)
CHLORIDE SERPL-SCNC: 105 MMOL/L (ref 97–108)
CO2 SERPL-SCNC: 24 MMOL/L (ref 21–32)
COLOR UR: ABNORMAL
COVID-19 RAPID TEST, COVR: NOT DETECTED
CREAT SERPL-MCNC: 0.89 MG/DL (ref 0.55–1.02)
DIFFERENTIAL METHOD BLD: ABNORMAL
EOSINOPHIL # BLD: 0.2 K/UL (ref 0–0.4)
EOSINOPHIL NFR BLD: 2 % (ref 0–7)
EPITH CASTS URNS QL MICRO: ABNORMAL /LPF
ERYTHROCYTE [DISTWIDTH] IN BLOOD BY AUTOMATED COUNT: 14.2 % (ref 11.5–14.5)
FLUAV H1 2009 PAND RNA SPEC QL NAA+PROBE: NOT DETECTED
FLUAV H1 RNA SPEC QL NAA+PROBE: NOT DETECTED
FLUAV H3 RNA SPEC QL NAA+PROBE: NOT DETECTED
FLUAV SUBTYP SPEC NAA+PROBE: NOT DETECTED
FLUBV RNA SPEC QL NAA+PROBE: NOT DETECTED
GLOBULIN SER CALC-MCNC: 4.2 G/DL (ref 2–4)
GLUCOSE SERPL-MCNC: 96 MG/DL (ref 65–100)
GLUCOSE UR STRIP.AUTO-MCNC: NEGATIVE MG/DL
HADV DNA SPEC QL NAA+PROBE: NOT DETECTED
HCOV 229E RNA SPEC QL NAA+PROBE: NOT DETECTED
HCOV HKU1 RNA SPEC QL NAA+PROBE: NOT DETECTED
HCOV NL63 RNA SPEC QL NAA+PROBE: NOT DETECTED
HCOV OC43 RNA SPEC QL NAA+PROBE: NOT DETECTED
HCT VFR BLD AUTO: 40.3 % (ref 35–47)
HGB BLD-MCNC: 13.2 G/DL (ref 11.5–16)
HGB UR QL STRIP: ABNORMAL
HMPV RNA SPEC QL NAA+PROBE: NOT DETECTED
HPIV1 RNA SPEC QL NAA+PROBE: NOT DETECTED
HPIV2 RNA SPEC QL NAA+PROBE: NOT DETECTED
HPIV3 RNA SPEC QL NAA+PROBE: NOT DETECTED
HPIV4 RNA SPEC QL NAA+PROBE: NOT DETECTED
IMM GRANULOCYTES # BLD AUTO: 0 K/UL (ref 0–0.04)
IMM GRANULOCYTES NFR BLD AUTO: 0 % (ref 0–0.5)
KETONES UR QL STRIP.AUTO: NEGATIVE MG/DL
LACTATE SERPL-SCNC: 0.8 MMOL/L (ref 0.4–2)
LEUKOCYTE ESTERASE UR QL STRIP.AUTO: ABNORMAL
LYMPHOCYTES # BLD: 2 K/UL (ref 0.8–3.5)
LYMPHOCYTES NFR BLD: 19 % (ref 12–49)
M PNEUMO DNA SPEC QL NAA+PROBE: NOT DETECTED
MCH RBC QN AUTO: 32.6 PG (ref 26–34)
MCHC RBC AUTO-ENTMCNC: 32.8 G/DL (ref 30–36.5)
MCV RBC AUTO: 99.5 FL (ref 80–99)
MONOCYTES # BLD: 1.3 K/UL (ref 0–1)
MONOCYTES NFR BLD: 13 % (ref 5–13)
NEUTS SEG # BLD: 7 K/UL (ref 1.8–8)
NEUTS SEG NFR BLD: 66 % (ref 32–75)
NITRITE UR QL STRIP.AUTO: NEGATIVE
NRBC # BLD: 0 K/UL (ref 0–0.01)
NRBC BLD-RTO: 0 PER 100 WBC
PH UR STRIP: 5.5 [PH] (ref 5–8)
PLATELET # BLD AUTO: 193 K/UL (ref 150–400)
PMV BLD AUTO: 11.9 FL (ref 8.9–12.9)
POTASSIUM SERPL-SCNC: 4 MMOL/L (ref 3.5–5.1)
PROT SERPL-MCNC: 7.8 G/DL (ref 6.4–8.2)
PROT UR STRIP-MCNC: NEGATIVE MG/DL
RBC # BLD AUTO: 4.05 M/UL (ref 3.8–5.2)
RBC #/AREA URNS HPF: ABNORMAL /HPF (ref 0–5)
RSV RNA SPEC QL NAA+PROBE: NOT DETECTED
RV+EV RNA SPEC QL NAA+PROBE: NOT DETECTED
SARS-COV-2 PCR, COVPCR: NOT DETECTED
SARS-COV-2, COV2: NORMAL
SODIUM SERPL-SCNC: 141 MMOL/L (ref 136–145)
SOURCE, COVRS: NORMAL
SP GR UR REFRACTOMETRY: 1.01 (ref 1–1.03)
TROPONIN I BLD-MCNC: <0.04 NG/ML (ref 0–0.08)
UR CULT HOLD, URHOLD: NORMAL
UROBILINOGEN UR QL STRIP.AUTO: 0.2 EU/DL (ref 0.2–1)
WBC # BLD AUTO: 10.6 K/UL (ref 3.6–11)
WBC URNS QL MICRO: ABNORMAL /HPF (ref 0–4)

## 2021-09-22 PROCEDURE — 93005 ELECTROCARDIOGRAM TRACING: CPT

## 2021-09-22 PROCEDURE — 96374 THER/PROPH/DIAG INJ IV PUSH: CPT

## 2021-09-22 PROCEDURE — 71045 X-RAY EXAM CHEST 1 VIEW: CPT

## 2021-09-22 PROCEDURE — 87040 BLOOD CULTURE FOR BACTERIA: CPT

## 2021-09-22 PROCEDURE — 80053 COMPREHEN METABOLIC PANEL: CPT

## 2021-09-22 PROCEDURE — 84484 ASSAY OF TROPONIN QUANT: CPT

## 2021-09-22 PROCEDURE — 74011000250 HC RX REV CODE- 250: Performed by: EMERGENCY MEDICINE

## 2021-09-22 PROCEDURE — 0202U NFCT DS 22 TRGT SARS-COV-2: CPT

## 2021-09-22 PROCEDURE — 74011250636 HC RX REV CODE- 250/636: Performed by: EMERGENCY MEDICINE

## 2021-09-22 PROCEDURE — 74011000636 HC RX REV CODE- 636: Performed by: EMERGENCY MEDICINE

## 2021-09-22 PROCEDURE — 99285 EMERGENCY DEPT VISIT HI MDM: CPT

## 2021-09-22 PROCEDURE — 94640 AIRWAY INHALATION TREATMENT: CPT

## 2021-09-22 PROCEDURE — 81001 URINALYSIS AUTO W/SCOPE: CPT

## 2021-09-22 PROCEDURE — 65660000000 HC RM CCU STEPDOWN

## 2021-09-22 PROCEDURE — 83605 ASSAY OF LACTIC ACID: CPT

## 2021-09-22 PROCEDURE — 85025 COMPLETE CBC W/AUTO DIFF WBC: CPT

## 2021-09-22 PROCEDURE — 87635 SARS-COV-2 COVID-19 AMP PRB: CPT

## 2021-09-22 PROCEDURE — 36415 COLL VENOUS BLD VENIPUNCTURE: CPT

## 2021-09-22 PROCEDURE — 71275 CT ANGIOGRAPHY CHEST: CPT

## 2021-09-22 RX ORDER — DEXAMETHASONE SODIUM PHOSPHATE 10 MG/ML
10 INJECTION INTRAMUSCULAR; INTRAVENOUS ONCE
Status: COMPLETED | OUTPATIENT
Start: 2021-09-22 | End: 2021-09-22

## 2021-09-22 RX ORDER — IPRATROPIUM BROMIDE AND ALBUTEROL SULFATE 2.5; .5 MG/3ML; MG/3ML
3 SOLUTION RESPIRATORY (INHALATION)
Status: COMPLETED | OUTPATIENT
Start: 2021-09-22 | End: 2021-09-22

## 2021-09-22 RX ADMIN — IOPAMIDOL 80 ML: 755 INJECTION, SOLUTION INTRAVENOUS at 15:04

## 2021-09-22 RX ADMIN — IPRATROPIUM BROMIDE AND ALBUTEROL SULFATE 3 ML: .5; 3 SOLUTION RESPIRATORY (INHALATION) at 15:43

## 2021-09-22 RX ADMIN — DEXAMETHASONE SODIUM PHOSPHATE 10 MG: 10 INJECTION, SOLUTION INTRAMUSCULAR; INTRAVENOUS at 15:39

## 2021-09-22 NOTE — ED PROVIDER NOTES
72-year-old female history of COPD, pulmonary fibrosis presents the ER for evaluation of cough and shortness of breath. Patient reports over the past 2 weeks of worsening cough and shortness of breath. She is now short of breath with ambulation. She has tried over-the-counter cough medicines, amoxicillin as well as nasal rinses. Cough is dry nonproductive. She reports intermittent fevers of 100.3. Today her shortness of breath worsened. She typically wears oxygen at nighttime. She does not wear oxygen during the day. No associated runny nose, congestion, sore throat, muscle aches or body aches. No abdominal pain nausea or vomiting. No dysuria. No muscle aches or body aches. No alleviating factors. Social hx  Nonsmoker  +alcohol    The history is provided by the patient. No  was used.         Past Medical History:   Diagnosis Date    Adverse effect of anesthesia     vocal cord swelling with bronchoscopy - was given lidocaine for bronchoscopy - unsure if lidocaine this caused the swelling/\"put me out the second time\" and did not use lidocaine and there no problem    COPD (chronic obstructive pulmonary disease) (HCC)     does not use oxygen, \"mild COPD\" per pt, CXR states otherwise    Ill-defined condition     uses 2 liters of oxygen    Ill-defined condition     pulmonary fibrosis    Menopause     Pulmonary fibrosis ([address]     Thyroid disease     hypothryoid       Past Surgical History:   Procedure Laterality Date    COLONOSCOPY N/A 12/3/2018    COLONOSCOPY performed by Bill Boyle MD at [facility] [address]27 COLONOSCOPY N/A 4/22/2021    COLONOSCOPY   :- performed by Megan [facility]ang [facility] at [facility] ENDOSCOPY    HX CHOLECYSTECTOMY      HX COLONOSCOPY  9.2015    polyps x2    HX COLONOSCOPY  12/2018    polyp    HX OTHER SURGICAL      attempted bronchoscopy and one completed    HX TONSILLECTOMY      HX TUBAL LIGATION           Family History:   Problem Relation Age of Onset    Diabetes Mother     Breast Cancer Maternal Aunt     Pulmonary Embolism Maternal Aunt     Breast Cancer Paternal Aunt     Other Brother         colon disease - part of colon removed/was not cancer/had a \"bag\"       Social History     Socioeconomic History    Marital status: SINGLE     Spouse name: Not on file    Number of children: Not on file    Years of education: Not on file    Highest education level: Not on file   Occupational History    Not on file   Tobacco Use    Smoking status: Former Smoker     Types: Cigarettes    Smokeless tobacco: Never Used    Tobacco comment: 10-15 yrs ago   Vaping Use    Vaping Use: Never used   Substance and Sexual Activity    Alcohol use: Yes     Comment: 1-2 glasses of wine at night    Drug use: No    Sexual activity: Not on file   Other Topics Concern    Not on file   Social History Narrative    Not on file     Social Determinants of Health     Financial Resource Strain:     Difficulty of Paying Living Expenses:    Food Insecurity:     Worried About [address] in the Last Year:     [address] in the Last Year:    Transportation Needs:     Lack of Transportation (Medical):  Lack of Transportation (Non-Medical):    Physical Activity:     Days of Exercise per Week:     Minutes of Exercise per Session:    Stress:     Feeling of Stress :    Social Connections:     Frequency of Communication with Friends and Family:     Frequency of Social Gatherings with Friends and Family:     Attends Congregational Services:     Active Member of Clubs or Organizations:     Attends Club or Organization Meetings:     Marital Status:    Intimate Partner Violence:     Fear of Current or Ex-Partner:     Emotionally Abused:     Physically Abused:     Sexually Abused: ALLERGIES: Patient has no known allergies. Review of Systems   Constitutional: Positive for activity change, appetite change and fever. Negative for chills.    HENT: Positive for rhinorrhea. Negative for congestion and trouble swallowing. Respiratory: Positive for cough, chest tightness and shortness of breath. Gastrointestinal: Negative for abdominal pain, nausea and vomiting. Genitourinary: Negative for difficulty urinating, dysuria and urgency. Musculoskeletal: Negative for arthralgias and myalgias. Skin: Negative for color change and rash. Neurological: Negative for dizziness, light-headedness and headaches. All other systems reviewed and are negative. Vitals:    09/22/21 1335 09/22/21 1340   BP:  (!) 154/87   Pulse:  89   Resp:  17   Temp:  99.3 °F (37.4 °C)   SpO2: (!) 83% 93%   Weight:  73.3 kg (161 lb 9.6 oz)            Physical Exam  Vitals and nursing note reviewed. Constitutional:       General: She is not in acute distress. Appearance: She is well-developed. HENT:      Head: Normocephalic and atraumatic. Eyes:      Conjunctiva/sclera: Conjunctivae normal.      Pupils: Pupils are equal, round, and reactive to light. Cardiovascular:      Rate and Rhythm: Normal rate and regular rhythm. Pulses:           Carotid pulses are 2+ on the right side and 2+ on the left side. Radial pulses are 2+ on the right side and 2+ on the left side. Femoral pulses are 2+ on the right side and 2+ on the left side. Dorsalis pedis pulses are 2+ on the right side and 2+ on the left side. Posterior tibial pulses are 2+ on the right side and 2+ on the left side. Heart sounds: Normal heart sounds, S1 normal and S2 normal.   Pulmonary:      Effort: Pulmonary effort is normal. No respiratory distress. Breath sounds: Normal breath sounds. No wheezing. Comments: Persistent dry cough  Chest:      Chest wall: No tenderness. Abdominal:      General: Bowel sounds are normal. There is no distension or abdominal bruit. Palpations: Abdomen is soft. There is no hepatomegaly, splenomegaly, mass or pulsatile mass. Tenderness:  There is no abdominal tenderness. There is no guarding or rebound. Hernia: No hernia is present. There is no hernia in the ventral area. Comments: Abdomen exposed for exam. No pain with light or deep palpation. Musculoskeletal:         General: No tenderness. Normal range of motion. Cervical back: Normal range of motion and neck supple. Comments: Calves nontender to palpation bilaterally. No cords, negative homans sign   Lymphadenopathy:      Cervical: No cervical adenopathy. Skin:     General: Skin is warm and dry. Capillary Refill: Capillary refill takes less than 2 seconds. Findings: No erythema or rash. Neurological:      General: No focal deficit present. Mental Status: She is alert and oriented to person, place, and time. Cranial Nerves: No cranial nerve deficit. Motor: No abnormal muscle tone. Coordination: Coordination normal.   Psychiatric:         Mood and Affect: Mood normal.         Behavior: Behavior normal.         Thought Content: Thought content normal.         Judgment: Judgment normal.          MDM  Number of Diagnoses or Management Options  COPD exacerbation ([address]  Shortness of breath  Diagnosis management comments: This is a 65-year-old female presenting for persistent worsening cough and shortness of breath. Upon arrival she was ambulatory with room air sats 83%. Oxygen applied patient now 94%. Her lungs are clear. She has a persistent dry cough. No peripheral edema. Abdomen soft and nontender. Afebrile. No tachycardia or tachypnea. Plan: EKG, labs, chest x-ray, CT    3:31 PM  No acute process chest x-ray. No PE on CT. EKG rhythm normal sinus rhythm no acute ST changes. No anemia or electrolyte abnormalities. Troponin is negative. Rapid Covid is negative. Further Covid is pending. Patient remains feeling short of breath. Upon return from CT pulse ox was 87. Patient requiring oxygen to remain above 90%.   We will add RSV panel, give patient Decadron and breathing. Will admit. 4:10  I have perfect serve with hospitalist Dr. Delilah Delarosa. Discussed patient's presentation, history, physical assessment, and available diagnostic results. They will write orders and admit the patient to the hospital. All available results have been reviewed with the patient and any available family. Care plan has been outlined and questions have been answered. Patient and any available family understands and agrees to need for admission to hospital for further treatment not available in ED. Patient is ready for admission. Amount and/or Complexity of Data Reviewed  Discuss the patient with other providers: yes (ER attending-Tamie)    Patient Progress  Patient progress: stable         Procedures      ED EKG interpretation:  Rhythm: normal sinus rhythm; and regular . Rate (approx.): 70; Axis: normal; P wave: normal; QRS interval: normal ; ST/T wave: normal; EKG documented by Jordan Santiago PA-C,  Kerri Castro MD, ED MD.           [address] [place] [address] for Admission  4:09 PM    ED Room Number: SER10/10  Patient Name and age:  Felicia Kraft 70 y.o.  female  Working Diagnosis: dyspnea    COVID-19 Suspicion:  yes  Sepsis present:  no  Reassessment needed: no  Code Status:  Full Code  Readmission: no  Isolation Requirements:  no  Recommended Level of Care:  telemetry  Department: short pump [phone]  Other:  Hx of copd and pulmonary fibrosis. Cough and sob x 2 weeks. Cough and sob worsening. cxr negative. Chest ct negative. Rapid covid negative. Pt hypoxic 86-87% RA, low 90s on 2 L. Wears oxygen at night but never during day. Giving nebs and decadron. Pt has been seen and evaluated by attending physician who has reviewed lab work and imaging tests. He agrees with admission plan.

## 2021-09-22 NOTE — ED TRIAGE NOTES
Patient arrives reporting cough and sob worsening over the past few days.  +white sputum, +fever    Hx of pulmonary fibrosis and copd    02 in triage 83% after ambulation, placed on 3l NC returned to 95%

## 2021-09-23 LAB
ANION GAP SERPL CALC-SCNC: 7 MMOL/L (ref 5–15)
ATRIAL RATE: 71 BPM
BASOPHILS # BLD: 0 K/UL (ref 0–0.1)
BASOPHILS NFR BLD: 0 % (ref 0–1)
BUN SERPL-MCNC: 15 MG/DL (ref 6–20)
BUN/CREAT SERPL: 23 (ref 12–20)
CALCIUM SERPL-MCNC: 9.3 MG/DL (ref 8.5–10.1)
CALCULATED P AXIS, ECG09: 55 DEGREES
CALCULATED R AXIS, ECG10: 42 DEGREES
CALCULATED T AXIS, ECG11: 20 DEGREES
CHLORIDE SERPL-SCNC: 110 MMOL/L (ref 97–108)
CO2 SERPL-SCNC: 22 MMOL/L (ref 21–32)
CREAT SERPL-MCNC: 0.66 MG/DL (ref 0.55–1.02)
DIAGNOSIS, 93000: NORMAL
DIFFERENTIAL METHOD BLD: ABNORMAL
EOSINOPHIL # BLD: 0 K/UL (ref 0–0.4)
EOSINOPHIL NFR BLD: 0 % (ref 0–7)
ERYTHROCYTE [DISTWIDTH] IN BLOOD BY AUTOMATED COUNT: 14 % (ref 11.5–14.5)
GLUCOSE SERPL-MCNC: 156 MG/DL (ref 65–100)
HCT VFR BLD AUTO: 39.4 % (ref 35–47)
HGB BLD-MCNC: 13 G/DL (ref 11.5–16)
IMM GRANULOCYTES # BLD AUTO: 0.1 K/UL (ref 0–0.04)
IMM GRANULOCYTES NFR BLD AUTO: 1 % (ref 0–0.5)
LYMPHOCYTES # BLD: 0.8 K/UL (ref 0.8–3.5)
LYMPHOCYTES NFR BLD: 13 % (ref 12–49)
MCH RBC QN AUTO: 33 PG (ref 26–34)
MCHC RBC AUTO-ENTMCNC: 33 G/DL (ref 30–36.5)
MCV RBC AUTO: 100 FL (ref 80–99)
MONOCYTES # BLD: 0.2 K/UL (ref 0–1)
MONOCYTES NFR BLD: 3 % (ref 5–13)
NEUTS SEG # BLD: 5.1 K/UL (ref 1.8–8)
NEUTS SEG NFR BLD: 83 % (ref 32–75)
NRBC # BLD: 0 K/UL (ref 0–0.01)
NRBC BLD-RTO: 0 PER 100 WBC
P-R INTERVAL, ECG05: 144 MS
PLATELET # BLD AUTO: 196 K/UL (ref 150–400)
PLATELET COMMENTS,PCOM: ABNORMAL
PMV BLD AUTO: 12.3 FL (ref 8.9–12.9)
POTASSIUM SERPL-SCNC: 4.4 MMOL/L (ref 3.5–5.1)
Q-T INTERVAL, ECG07: 410 MS
QRS DURATION, ECG06: 76 MS
QTC CALCULATION (BEZET), ECG08: 445 MS
RBC # BLD AUTO: 3.94 M/UL (ref 3.8–5.2)
RBC MORPH BLD: ABNORMAL
SODIUM SERPL-SCNC: 139 MMOL/L (ref 136–145)
VENTRICULAR RATE, ECG03: 71 BPM
WBC # BLD AUTO: 6.2 K/UL (ref 3.6–11)

## 2021-09-23 PROCEDURE — 77010033678 HC OXYGEN DAILY

## 2021-09-23 PROCEDURE — 36415 COLL VENOUS BLD VENIPUNCTURE: CPT

## 2021-09-23 PROCEDURE — 80048 BASIC METABOLIC PNL TOTAL CA: CPT

## 2021-09-23 PROCEDURE — 85025 COMPLETE CBC W/AUTO DIFF WBC: CPT

## 2021-09-23 PROCEDURE — 65660000000 HC RM CCU STEPDOWN

## 2021-09-23 PROCEDURE — 74011250637 HC RX REV CODE- 250/637: Performed by: FAMILY MEDICINE

## 2021-09-23 PROCEDURE — 74011000250 HC RX REV CODE- 250: Performed by: HOSPITALIST

## 2021-09-23 PROCEDURE — 94760 N-INVAS EAR/PLS OXIMETRY 1: CPT

## 2021-09-23 PROCEDURE — 74011000250 HC RX REV CODE- 250: Performed by: FAMILY MEDICINE

## 2021-09-23 PROCEDURE — 74011250636 HC RX REV CODE- 250/636: Performed by: HOSPITALIST

## 2021-09-23 PROCEDURE — 94640 AIRWAY INHALATION TREATMENT: CPT

## 2021-09-23 PROCEDURE — 74011250637 HC RX REV CODE- 250/637: Performed by: NURSE PRACTITIONER

## 2021-09-23 PROCEDURE — 74011250636 HC RX REV CODE- 250/636: Performed by: INTERNAL MEDICINE

## 2021-09-23 PROCEDURE — 74011636637 HC RX REV CODE- 636/637: Performed by: HOSPITALIST

## 2021-09-23 PROCEDURE — 94664 DEMO&/EVAL PT USE INHALER: CPT

## 2021-09-23 PROCEDURE — 74011250637 HC RX REV CODE- 250/637: Performed by: HOSPITALIST

## 2021-09-23 PROCEDURE — 74011250636 HC RX REV CODE- 250/636: Performed by: FAMILY MEDICINE

## 2021-09-23 RX ORDER — ENOXAPARIN SODIUM 100 MG/ML
40 INJECTION SUBCUTANEOUS DAILY
Status: DISCONTINUED | OUTPATIENT
Start: 2021-09-23 | End: 2021-09-26 | Stop reason: HOSPADM

## 2021-09-23 RX ORDER — ACETAMINOPHEN 650 MG/1
650 SUPPOSITORY RECTAL
Status: DISCONTINUED | OUTPATIENT
Start: 2021-09-23 | End: 2021-09-26 | Stop reason: HOSPADM

## 2021-09-23 RX ORDER — IPRATROPIUM BROMIDE AND ALBUTEROL SULFATE 2.5; .5 MG/3ML; MG/3ML
3 SOLUTION RESPIRATORY (INHALATION)
Status: DISCONTINUED | OUTPATIENT
Start: 2021-09-23 | End: 2021-09-24

## 2021-09-23 RX ORDER — BENZONATATE 100 MG/1
100 CAPSULE ORAL
Status: DISCONTINUED | OUTPATIENT
Start: 2021-09-23 | End: 2021-09-26 | Stop reason: HOSPADM

## 2021-09-23 RX ORDER — MONTELUKAST SODIUM 10 MG/1
10 TABLET ORAL
Status: DISCONTINUED | OUTPATIENT
Start: 2021-09-23 | End: 2021-09-26 | Stop reason: HOSPADM

## 2021-09-23 RX ORDER — FUROSEMIDE 10 MG/ML
40 INJECTION INTRAMUSCULAR; INTRAVENOUS 2 TIMES DAILY
Status: COMPLETED | OUTPATIENT
Start: 2021-09-23 | End: 2021-09-24

## 2021-09-23 RX ORDER — SODIUM CHLORIDE 0.9 % (FLUSH) 0.9 %
5-40 SYRINGE (ML) INJECTION EVERY 8 HOURS
Status: DISCONTINUED | OUTPATIENT
Start: 2021-09-23 | End: 2021-09-26 | Stop reason: HOSPADM

## 2021-09-23 RX ORDER — PANTOPRAZOLE SODIUM 40 MG/1
40 TABLET, DELAYED RELEASE ORAL
Status: DISCONTINUED | OUTPATIENT
Start: 2021-09-23 | End: 2021-09-26 | Stop reason: HOSPADM

## 2021-09-23 RX ORDER — LEVOTHYROXINE SODIUM 88 UG/1
88 TABLET ORAL
Status: DISCONTINUED | OUTPATIENT
Start: 2021-09-24 | End: 2021-09-26 | Stop reason: HOSPADM

## 2021-09-23 RX ORDER — ONDANSETRON 2 MG/ML
4 INJECTION INTRAMUSCULAR; INTRAVENOUS
Status: DISCONTINUED | OUTPATIENT
Start: 2021-09-23 | End: 2021-09-26 | Stop reason: HOSPADM

## 2021-09-23 RX ORDER — SODIUM CHLORIDE 0.9 % (FLUSH) 0.9 %
5-40 SYRINGE (ML) INJECTION AS NEEDED
Status: DISCONTINUED | OUTPATIENT
Start: 2021-09-23 | End: 2021-09-26 | Stop reason: HOSPADM

## 2021-09-23 RX ORDER — BUDESONIDE 0.5 MG/2ML
500 INHALANT ORAL
Status: DISCONTINUED | OUTPATIENT
Start: 2021-09-23 | End: 2021-09-26 | Stop reason: HOSPADM

## 2021-09-23 RX ORDER — LEVOFLOXACIN 5 MG/ML
750 INJECTION, SOLUTION INTRAVENOUS EVERY 24 HOURS
Status: DISCONTINUED | OUTPATIENT
Start: 2021-09-23 | End: 2021-09-25

## 2021-09-23 RX ORDER — PREDNISONE 20 MG/1
40 TABLET ORAL
Status: DISCONTINUED | OUTPATIENT
Start: 2021-09-23 | End: 2021-09-26 | Stop reason: HOSPADM

## 2021-09-23 RX ORDER — ONDANSETRON 4 MG/1
4 TABLET, ORALLY DISINTEGRATING ORAL
Status: DISCONTINUED | OUTPATIENT
Start: 2021-09-23 | End: 2021-09-26 | Stop reason: HOSPADM

## 2021-09-23 RX ORDER — ACETAMINOPHEN 325 MG/1
650 TABLET ORAL
Status: DISCONTINUED | OUTPATIENT
Start: 2021-09-23 | End: 2021-09-26 | Stop reason: HOSPADM

## 2021-09-23 RX ADMIN — FUROSEMIDE 40 MG: 10 INJECTION, SOLUTION INTRAMUSCULAR; INTRAVENOUS at 17:20

## 2021-09-23 RX ADMIN — Medication 10 ML: at 23:13

## 2021-09-23 RX ADMIN — PANTOPRAZOLE SODIUM 40 MG: 40 TABLET, DELAYED RELEASE ORAL at 23:11

## 2021-09-23 RX ADMIN — Medication 10 ML: at 08:33

## 2021-09-23 RX ADMIN — BENZONATATE 100 MG: 100 CAPSULE ORAL at 23:11

## 2021-09-23 RX ADMIN — IPRATROPIUM BROMIDE AND ALBUTEROL SULFATE 3 ML: .5; 3 SOLUTION RESPIRATORY (INHALATION) at 12:41

## 2021-09-23 RX ADMIN — ACETAMINOPHEN 650 MG: 325 TABLET ORAL at 15:53

## 2021-09-23 RX ADMIN — LEVOFLOXACIN 750 MG: 5 INJECTION, SOLUTION INTRAVENOUS at 15:47

## 2021-09-23 RX ADMIN — ENOXAPARIN SODIUM 40 MG: 40 INJECTION SUBCUTANEOUS at 08:30

## 2021-09-23 RX ADMIN — MONTELUKAST 10 MG: 10 TABLET, FILM COATED ORAL at 23:11

## 2021-09-23 RX ADMIN — BENZONATATE 100 MG: 100 CAPSULE ORAL at 15:47

## 2021-09-23 RX ADMIN — AZITHROMYCIN MONOHYDRATE 500 MG: 500 INJECTION, POWDER, LYOPHILIZED, FOR SOLUTION INTRAVENOUS at 10:16

## 2021-09-23 RX ADMIN — IPRATROPIUM BROMIDE AND ALBUTEROL SULFATE 3 ML: .5; 3 SOLUTION RESPIRATORY (INHALATION) at 21:05

## 2021-09-23 RX ADMIN — BUDESONIDE 500 MCG: 0.5 INHALANT RESPIRATORY (INHALATION) at 21:05

## 2021-09-23 RX ADMIN — PREDNISONE 40 MG: 20 TABLET ORAL at 10:16

## 2021-09-23 RX ADMIN — IPRATROPIUM BROMIDE AND ALBUTEROL SULFATE 3 ML: .5; 3 SOLUTION RESPIRATORY (INHALATION) at 07:43

## 2021-09-23 NOTE — CONSULTS
Pulmonary, Critical Care, and Sleep Medicine~Consult Note    Name: Kamryn Coffey MRN: [id number]   : 1945 Hospital: [facility]   Date: 2021 12:27 PM Admission: 2021     Impression Plan   1. Hypoxemic respiratory failure. Baseline O2 requirement 2 lpm Noctrunal.  2. Acute sinusitis. 3. Allergic rhinitis - on allergy shots. 4. 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 5. Pulmonary hypertension with paps 46 mmhg - mild. 1. Change zithromax -> Levaquin. 2. O2 supplementation to keep sats above 90%. 3. Check 6MWT before dischrge from the hospital.  4. Recheck echo for pasp. 5. Will give a trial of lasix x 24 hours. 6. On prednisone. Will do a short course. 7. OFEV 100 mg q daily - due to side effects. Thank you for the consult. Will follow. Consultation note:    Consultation requested by Dr Nila Alvarado. Reason for the consultation - hypoxemic respiratory failure. 70year old male with past medical history of CPFE who presented to [facility] with increasing cough and shortness of breath. Patient is followed in our office by Dr Chrissy Antunez. For the past 2 months has been complaining of cough with nasal drip. Treated as out patient with amoxicillin without any improvement. She does have hx of allergies and receives allergy shots. For the past 1 week she has cough productive of scant and clear sputum along with increased shortness of breath. Yesterday she could not breath and came to the hospital with fatigue, tiredness and feeling very cold with sweating. Temp in the Ed was 101 and she was admitted to the hospital. Her O2 sats were noted to be in 80's. She has complaints of sinus pain, headaches and post nasal drip that makes her choke. From pulmonary stand point - she has ILD and COPD (CPFE). Currently on OFEv for the past 2 years. She also uses O2 2 lpm by N[place] at night. Out patient PFT reviewed. No obs, No restriction. DLCO severly reduced. Declining DLCO for the past 3 years. I have reviewed the labs and previous days notes. A comprehensive review of systems was negative except for: Respiratory: positive for cough or dyspnea on exertion  Past Medical History:   Diagnosis Date    Adverse effect of anesthesia     vocal cord swelling with bronchoscopy - was given lidocaine for bronchoscopy - unsure if lidocaine this caused the swelling/\"put me out the second time\" and did not use lidocaine and there no problem    COPD (chronic obstructive pulmonary disease) (HCC)     does not use oxygen, \"mild COPD\" per pt, CXR states otherwise    Ill-defined condition     uses 2 liters of oxygen    Ill-defined condition     pulmonary fibrosis    Menopause     Pulmonary fibrosis ([facility] [address]     Thyroid disease     hypothryoid      Past Surgical History:   Procedure Laterality Date    COLONOSCOPY N/A 12/3/2018    COLONOSCOPY performed by Afsaneh Downs MD at [address] [place] [address] N/A 4/22/2021    COLONOSCOPY   :- performed by Cheri Hutchison MD at [facility] ENDOSCOPY    HX CHOLECYSTECTOMY      HX COLONOSCOPY  9.2015    polyps x2    HX COLONOSCOPY  12/2018    polyp    HX OTHER SURGICAL      attempted bronchoscopy and one completed    HX TONSILLECTOMY      HX TUBAL LIGATION        Prior to Admission medications    Medication Sig Start Date End Date Taking? Authorizing Provider   tiotropium bromide (Spiriva Respimat) 2.5 mcg/actuation inhaler Take 2 Puffs by inhalation daily. Provider, Historical   fexofenadine (ALLEGRA) 180 mg tablet Take 180 mg by mouth daily. Provider, Historical   cyanocobalamin (VITAMIN B-12) 1,000 mcg sublingual tablet Take 1,000 mcg by mouth daily. Provider, Historical   citalopram (CELEXA) 20 mg tablet Take 20 mg by mouth daily. Provider, Historical   zinc 50 mg tab tablet Take 50 mg by mouth daily. Provider, Historical   GREEN TEA EXTRACT PO Take 1,000 mg by mouth daily. Provider, Historical   acetylcysteine (NAC) 600 mg cap capsule Take 600 mg by mouth daily. Provider, Historical   nintedanib (Ofev) 150 mg cap Take 150 mg by mouth daily. Provider, Historical   cod liver oil cap Take 1 Cap by mouth daily. [place] cod liver oil    Provider, Historical   omeprazole (PRILOSEC) 40 mg capsule Take 40 mg by mouth daily. Takes one po in the afternoon. Provider, Historical   OTHER Cordyceps for respiratory health and immune system. Takes 1000 mg once daily. Provider, Historical   Ascorbic Acid-Multivits-Min (Emergen-C) 1,000 mg pwep Take 1,000 mg by mouth daily. Provider, Historical   MULTIVITAMIN PO Take  by mouth. Takes 2 packets with 3 pills each once a day. Has vitamin D3, turmeric, and omega 3. Provider, Historical   COLLAGEN Take  by mouth. Powder. Takes occasionally. Mixes with emergen C occasionally. Provider, Historical   famotidine (PEPCID) 20 mg tablet Take 20 mg by mouth every morning. Provider, Historical   montelukast (SINGULAIR) 10 mg tablet Take 10 mg by mouth daily. Provider, Historical   levothyroxine (SYNTHROID) 88 mcg tablet Take 88 mcg by mouth Daily (before breakfast).     Provider, Historical     No Known Allergies   Social History     Tobacco Use    Smoking status: Former Smoker     Types: Cigarettes    Smokeless tobacco: Never Used    Tobacco comment: 10-15 yrs ago   Substance Use Topics    Alcohol use: Yes     Comment: 1-2 glasses of wine at night      Family History   Problem Relation Age of Onset    Diabetes Mother     Breast Cancer Maternal Aunt     Pulmonary Embolism Maternal Aunt     Breast Cancer Paternal Aunt     Other Brother         colon disease - part of colon removed/was not cancer/had a \"bag\"     OBJECTIVE:     Vital Signs:       Visit Vitals  BP (!) 117/53 (BP 1 Location: Right upper arm, BP Patient Position: At rest)   Pulse 79   Temp 98.5 °F (36.9 °C)   Resp 15   Wt 72.5 kg (159 lb 12.8 oz)   SpO2 92%   BMI 30.19 kg/m²      Temp (24hrs), Av.4 °F (36.9 °C), Min:97.3 °F (36.3 °C), Max:99.3 °F (37.4 °C)     Intake/Output:     Last shift: No intake/output data recorded. Last 3 shifts: No intake/output data recorded.       No intake or output data in the 24 hours ending 21 1227    Physical Exam:                                        Exam Findings Other   General: No resp distress noted, appears stated age    [de-identified]:  No ulcers, JVD not elevated, no cervical LAD    Chest: No pectus deformity, normal chest rise b/l    HEART:  RRR, no murmurs/rubs/gallops    Lungs:  B/l crackles    ABD: Soft/NT, non rigid mildly distended    EXT: No cyanosis/clubbing/edema, normal peripheral pulses    Skin: No rashes or ulcers, no mottling    Neuro: A/O x 3        Medications:  Current Facility-Administered Medications   Medication Dose Route Frequency    sodium chloride (NS) flush 5-40 mL  5-40 mL IntraVENous Q8H    sodium chloride (NS) flush 5-40 mL  5-40 mL IntraVENous PRN    acetaminophen (TYLENOL) tablet 650 mg  650 mg Oral Q6H PRN    Or    acetaminophen (TYLENOL) suppository 650 mg  650 mg Rectal Q6H PRN    ondansetron (ZOFRAN ODT) tablet 4 mg  4 mg Oral Q8H PRN    Or    ondansetron (ZOFRAN) injection 4 mg  4 mg IntraVENous Q6H PRN    enoxaparin (LOVENOX) injection 40 mg  40 mg SubCUTAneous DAILY    albuterol-ipratropium (DUO-NEB) 2.5 MG-0.5 MG/3 ML  3 mL Nebulization Q6H RT    predniSONE (DELTASONE) tablet 40 mg  40 mg Oral DAILY WITH BREAKFAST    azithromycin (ZITHROMAX) 500 mg in 0.9% sodium chloride 250 mL (VIAL-MATE)  500 mg IntraVENous Q24H    benzonatate (TESSALON) capsule 100 mg  100 mg Oral TID PRN    budesonide (PULMICORT) 500 mcg/2 ml nebulizer suspension  500 mcg Nebulization BID RT    [START ON 2021] levothyroxine (SYNTHROID) tablet 88 mcg  88 mcg Oral ACB    montelukast (SINGULAIR) tablet 10 mg  10 mg Oral QHS    nintedanib cap 150 mg  150 mg Oral DAILY       Labs:  ABG No results for input(s): PHI, PCO2I, PO2I, HCO3I, SO2I, FIO2I in the last 72 hours.      CBC Recent Labs     09/23/21  0243 09/22/21  1346   WBC 6.2 10.6   HGB 13.0 13.2   HCT 39.4 40.3    193   .0* 99.5*   MCH 33.0 92.9        Metabolic  Panel Recent Labs     09/23/21  0243 09/22/21  1346    141   K 4.4 4.0   * 105   CO2 22 24   * 96   BUN 15 24*   CREA 0.66 0.89   CA 9.3 9.3   ALB  --  3.6   ALT  --  28        Pertinent Mara Sanchez MD  9/23/2021

## 2021-09-23 NOTE — H&P
[facility] Adult  Hospitalist Group  History and Physical    Primary Care Provider: Mikaela Dumont MD  Date of Service:  9/23/2021    Subjective:     Alex Cherry is a 70 y.o. female with a pmhx chronic hypoxic respiratory failure 2/2 COPD, and pulmonary fibrosis, presented to the ED for cough, and worsening dyspnea for the past two weeks. Her sx were associated with intermittent fever Tmax 100.3. Her sx did not improved with amoxicillin, or OTC cough meds. She has not been febrile since presenting to the ED. In the ED, she was hypoxic to 83% on RA with improvement to 92% on 4Lnc. Labs were significant for BuN 24. UA showed small leukocyte esterase, and trace blood. Rapid covid and respiratory viral panel was negative,. CXR showed no acute intracranial process, and CTA thorax was negative for PE. In the ED, she received a duoneb, and decadron, with subsequent improvement in her dyspnea subjectively. Review of Systems:    All other review of systems were negative except for that written in the History of Present Illness.      Past Medical History:   Diagnosis Date    Adverse effect of anesthesia     vocal cord swelling with bronchoscopy - was given lidocaine for bronchoscopy - unsure if lidocaine this caused the swelling/\"put me out the second time\" and did not use lidocaine and there no problem    COPD (chronic obstructive pulmonary disease) (HCC)     does not use oxygen, \"mild COPD\" per pt, CXR states otherwise    Ill-defined condition     uses 2 liters of oxygen    Ill-defined condition     pulmonary fibrosis    Menopause     Pulmonary fibrosis ([address]     Thyroid disease     hypothryoid      Past Surgical History:   Procedure Laterality Date    COLONOSCOPY N/A 12/3/2018    COLONOSCOPY performed by Marzena Da Silva MD at [address] [place] [address] N/A 4/22/2021    COLONOSCOPY   :- performed [facility]ley [facility] at [facility] ENDOSCOPY    HX CHOLECYSTECTOMY      HX COLONOSCOPY 9.2015    polyps x2    HX COLONOSCOPY  12/2018    polyp    HX OTHER SURGICAL      attempted bronchoscopy and one completed    HX TONSILLECTOMY      HX TUBAL LIGATION       Prior to Admission medications    Medication Sig Start Date End Date Taking? Authorizing Provider   tiotropium bromide (Spiriva Respimat) 2.5 mcg/actuation inhaler Take 2 Puffs by inhalation daily. Provider, Historical   fexofenadine (ALLEGRA) 180 mg tablet Take 180 mg by mouth daily. Provider, Historical   cyanocobalamin (VITAMIN B-12) 1,000 mcg sublingual tablet Take 1,000 mcg by mouth daily. Provider, Historical   citalopram (CELEXA) 20 mg tablet Take 20 mg by mouth daily. Provider, Historical   zinc 50 mg tab tablet Take 50 mg by mouth daily. Provider, Historical   GREEN TEA EXTRACT PO Take 1,000 mg by mouth daily. Provider, Historical   acetylcysteine (NAC) 600 mg cap capsule Take 600 mg by mouth daily. Provider, Historical   nintedanib (Ofev) 150 mg cap Take 150 mg by mouth daily. Provider, Historical   cod liver oil cap Take 1 Cap by mouth daily. [place] cod liver oil    Provider, Historical   omeprazole (PRILOSEC) 40 mg capsule Take 40 mg by mouth daily. Takes one po in the afternoon. Provider, Historical   OTHER Cordyceps for respiratory health and immune system. Takes 1000 mg once daily. Provider, Historical   Ascorbic Acid-Multivits-Min (Emergen-C) 1,000 mg pwep Take 1,000 mg by mouth daily. Provider, Historical   MULTIVITAMIN PO Take  by mouth. Takes 2 packets with 3 pills each once a day. Has vitamin D3, turmeric, and omega 3. Provider, Historical   COLLAGEN Take  by mouth. Powder. Takes occasionally. Mixes with emergen C occasionally. Provider, Historical   famotidine (PEPCID) 20 mg tablet Take 20 mg by mouth every morning. Provider, Historical   montelukast (SINGULAIR) 10 mg tablet Take 10 mg by mouth daily.     Provider, Historical   levothyroxine (SYNTHROID) 88 mcg tablet Take 88 mcg by mouth Daily (before breakfast). Provider, Historical     No Known Allergies   Family History   Problem Relation Age of Onset    Diabetes Mother     Breast Cancer Maternal Aunt     Pulmonary Embolism Maternal Aunt     Breast Cancer Paternal Aunt     Other Brother         colon disease - part of colon removed/was not cancer/had a \"bag\"        SOCIAL HISTORY:  Patient resides at Home. Patient ambulates independently  Smoking history:nfno  Alcohol history: none        Objective:       Physical Exam:   Visit Vitals  BP (!) 129/54 (BP 1 Location: Left upper arm, BP Patient Position: At rest)   Pulse 65   Temp 98.3 °F (36.8 °C)   Resp 20   Wt 73.3 kg (161 lb 9.6 oz)   SpO2 92%   BMI 30.53 kg/m²     General:  Alert, cooperative, no distress, appears stated age. Head:  Normocephalic, without obvious abnormality, atraumatic. Eyes:                                                                                                                                                                                                                                                                                         Ears:  Normal TMs and external ear canals both ears. Nose: Nares normal. Septum midline. Mucosa normal. No drainage or sinus tenderness. Throat: Lips, mucosa, and tongue normal.    Neck: Supple, symmetrical, trachea midline, no adenopathy, thyroid: no enlargement/tenderness/nodules, no carotid bruit and no JVD. Back:   Symmetric, no curvature. ROM normal. No CVA tenderness. Lungs:   Clear to auscultation bilaterally. Chest wall:  No tenderness or deformity. Heart:  Regular rate and rhythm, S1, S2 normal, no murmur, click, rub or gallop. Abdomen:   Soft, non-tender. Bowel sounds normal. N           Extremities: Extremities normal, atraumatic, no cyanosis or edema. Pulses: 2+ and symmetric all extremities. Skin: Skin color, texture, turgor normal. No rashes or lesions.      ECG: NSR 71 bpm    Data Review: All diagnostic labs and studies have been reviewed. Chest x-ray: no acute cardiopulmonary process    Assessment:     Active Problems:    SOB (shortness of breath) (9/22/2021)        Plan:     #Acute on Chronic Hypoxic Respiratory Failure  #Pulmonary Fibrosis  #Emphysema/COPD exacerbation  Supplemental oxygen to maintain SPO2 88 to 93%  Patient wears nocturnal oxygen chronically, but daytime oxygen requirement is new  CTA thorax negative for PE, or other acute pathology.   Continue scheduled [facility]    FUNCTIONAL STATUS PRIOR TO HOSPITALIZATION Ambulates Independently      Signed By: Karen Velazquez MD     September 23, 2021

## 2021-09-23 NOTE — ROUTINE PROCESS
2230: Called ED triage Dr. Nikkie Mera about Pt. Admission. No call received in 30 min  2300: Attempted call  to Dr. Nikkie Mera but no call received in 30 min. 2345: Attempted call to Dr. Nikkie Mera. Message received that he is sending other DrKimberley From ED to see the patient. TRANSFER - IN REPORT:    Verbal report received from April(name) on Jordin Knapp  being received from [facility](unit) for routine progression of care      Report consisted of patients Situation, Background, Assessment and   Recommendations(SBAR). Information from the following report(s) SBAR, Kardex, ED Summary, Procedure Summary, Intake/Output, MAR, Accordion, Recent Results, Med Rec Status, Cardiac Rhythm NSR and Quality Measures was reviewed with the receiving nurse. Opportunity for questions and clarification was provided. Assessment completed upon patients arrival to unit and care assumed.      Primary Nurse Vivian Colorado, FREDI and Babatunde Cuevas RN performed a dual skin assessment on this patient No impairment noted  Boyd score is 21

## 2021-09-23 NOTE — PROGRESS NOTES
Bedside shift change report given to [address] [place] [address],[address] (oncoming nurse) by RAHEEM Robledo RN (offgoing nurse). Report included the following information SBAR, Kardex and Cardiac Rhythm SR/SB.

## 2021-09-23 NOTE — PROGRESS NOTES
[facility] Adult  Hospitalist Group                                                                                          Hospitalist Progress Note  Jenifer Morgan MD  Answering service: [phone] from in house phone        Date of Service:  2021  NAME:  Fabrizio Laura  :  1945  MRN:  [id number]      Admission Summary:   Fabrizio Laura is a 70 y.o. female with a pmhx chronic hypoxic respiratory failure 2/2 COPD, and pulmonary fibrosis, presented to the ED for cough, and worsening dyspnea for the past two weeks. Her sx were associated with intermittent fever Tmax 100.3. Her sx did not improved with amoxicillin, or OTC cough meds. She has not been febrile since presenting to the ED. Interval history / Subjective:   Patient seen and examined overnight admission continues to have cough patient was able to speak in full sentences medication reviewed with the patient follows with Dr. Valeri Pollard as an outpatient with pulmonary physicians     Assessment & Plan:     #Acute on Chronic Hypoxic Respiratory Failure  #Pulmonary Fibrosis  #Emphysema/COPD exacerbation  Supplemental oxygen to maintain SPO2 88 to 93%  --Start prednisone 40 mg daily for 5 days  --Wean off oxygen as tolerates  Patient wears nocturnal oxygen chronically, but daytime oxygen requirement is new  CTA thorax negative for PE, or other acute pathology. Continue scheduled DuoNebs  --Resume home nintedanib capsule for pulmonary fibrosis  --Hypothyroidism continue levothyroxine    Code status: Full code  DVT prophylaxis: Lovenox    Care Plan discussed with: Patient/Family and Nurse  Anticipated Disposition: Home w/Family  Anticipated Discharge: 24 hours to 48 hours     Hospital Problems  Date Reviewed: 2018        Codes Class Noted POA    SOB (shortness of breath) ICD-10-CM: R06.02  ICD-9-CM: 786.05  2021 Unknown                Review of Systems:   A comprehensive review of systems was negative. Vital Signs:    Last 24hrs VS reviewed since prior progress note. Most recent are:  Visit Vitals  BP (!) 117/53 (BP 1 Location: Right upper arm, BP Patient Position: At rest)   Pulse 79   Temp 98.5 °F (36.9 °C)   Resp 15   Wt 72.5 kg (159 lb 12.8 oz)   SpO2 92%   BMI 30.19 kg/m²       No intake or output data in the 24 hours ending 09/23/21 1341     Physical Examination:     I had a face to face encounter with this patient and independently examined them on 9/23/2021 as outlined below:          Constitutional:  No acute distress, cooperative, pleasant  hacking cough   ENT:  MMM   Resp:  Few scattered wheezing   CV:  Regular rhythm, normal rate, tachycardia    GI:  Soft, non distended, non tender. Bowel sounds positive    Musculoskeletal:  No edema, warm, 2+ pulses throughout    Neurologic:  Moves all extremities. AAOx3, CN II-XII reviewed            Data Review:    I personally reviewed  Image and labs      Labs:     Recent Labs     09/23/21  0243 09/22/21  1346   WBC 6.2 10.6   HGB 13.0 13.2   HCT 39.4 40.3    193     Recent Labs     09/23/21  0243 09/22/21  1346    141   K 4.4 4.0   * 105   CO2 22 24   BUN 15 24*   CREA 0.66 0.89   * 96   CA 9.3 9.3     Recent Labs     09/22/21  1346   ALT 28   *   TBILI 0.4   TP 7.8   ALB 3.6   GLOB 4.2*     No results for input(s): INR, PTP, APTT, INREXT in the last 72 hours. No results for input(s): FE, TIBC, PSAT, FERR in the last 72 hours. No results found for: FOL, RBCF   No results for input(s): PH, PCO2, PO2 in the last 72 hours. No results for input(s): CPK, CKNDX, TROIQ in the last 72 hours.     No lab exists for component: CPKMB  No results found for: CHOL, CHOLX, CHLST, CHOLV, HDL, HDLP, LDL, LDLC, DLDLP, TGLX, TRIGL, TRIGP, CHHD, CHHDX  No results found for: [facility]  Lab Results   Component Value Date/Time    Color YELLOW/STRAW 09/22/2021 03:34 PM    Appearance CLOUDY (A) 09/22/2021 03:34 PM    Specific gravity 1.010 09/22/2021 03:34 PM    pH (UA) 5.5 09/22/2021 03:34 PM    Protein Negative 09/22/2021 03:34 PM    Glucose Negative 09/22/2021 03:34 PM    Ketone Negative 09/22/2021 03:34 PM    Bilirubin Negative 09/22/2021 03:34 PM    Urobilinogen 0.2 09/22/2021 03:34 PM    Nitrites Negative 09/22/2021 03:34 PM    Leukocyte Esterase SMALL (A) 09/22/2021 03:34 PM    Epithelial cells FEW 09/22/2021 03:34 PM    Bacteria Negative 09/22/2021 03:34 PM    WBC 10-20 09/22/2021 03:34 PM    RBC 0-5 09/22/2021 03:34 PM         Medications Reviewed:     Current Facility-Administered Medications   Medication Dose Route Frequency    sodium chloride (NS) flush 5-40 mL  5-40 mL IntraVENous Q8H    sodium chloride (NS) flush 5-40 mL  5-40 mL IntraVENous PRN    acetaminophen (TYLENOL) tablet 650 mg  650 mg Oral Q6H PRN    Or    acetaminophen (TYLENOL) suppository 650 mg  650 mg Rectal Q6H PRN    ondansetron (ZOFRAN ODT) tablet 4 mg  4 mg Oral Q8H PRN    Or    ondansetron (ZOFRAN) injection 4 mg  4 mg IntraVENous Q6H PRN    enoxaparin (LOVENOX) injection 40 mg  40 mg SubCUTAneous DAILY    albuterol-ipratropium (DUO-NEB) 2.5 MG-0.5 MG/3 ML  3 mL Nebulization Q6H RT    predniSONE (DELTASONE) tablet 40 mg  40 mg Oral DAILY WITH BREAKFAST    azithromycin (ZITHROMAX) 500 mg in 0.9% sodium chloride 250 mL (VIAL-MATE)  500 mg IntraVENous Q24H    benzonatate (TESSALON) capsule 100 mg  100 mg Oral TID PRN    budesonide (PULMICORT) 500 mcg/2 ml nebulizer suspension  500 mcg Nebulization BID RT    [START ON 9/24/2021] levothyroxine (SYNTHROID) tablet 88 mcg  88 mcg Oral ACB    montelukast (SINGULAIR) tablet 10 mg  10 mg Oral QHS    nintedanib cap 150 mg  150 mg Oral DAILY     ______________________________________________________________________  EXPECTED LENGTH OF STAY: 3d 14h  ACTUAL LENGTH OF STAY:          1                 Caroline Luke MD

## 2021-09-23 NOTE — PROGRESS NOTES
DANGELO:  Anticipate discharge home pending medical progress. Baseline 2L O2 at night through [address]. Transportation likely in car with friend. Reason for Admission:  AHRF/COPD                    RUR Score: 11%                    Plan for utilizing home health: N/A         PCP: First and Last name:  Randall Aggarwal MD     Name of Practice:    Are you a current patient: Yes/No:  yes   Approximate date of last visit: September 2021   Can you participate in a virtual visit with your PCP:                     Current Advanced Directive/Advance Care Plan: Full Code      Healthcare Decision Maker:   Click here to complete [id number] including selection of the Healthcare Decision Maker Relationship (ie \"Primary\")                          Transition of Care Plan:   CM met with patient at bedside. Patient alert and oriented x4. Patient confirmed demographics and stated she lives alone. Stated there are no stairs entering home or inside of home. DME:  Portable oxygen and concentrator through [address]. Hx of COPD and pulmonary fibrosis. Patient noted that she was walking 10,000 steps per day prior to admission. Patient confirmed PCP and stated she uses [facility] at [address] [facility], [facility] [address] Phone: [phone]. Emergency contacts:  Sonia Frazier, friend, Vikram And Adrián Streets, daughter-in-law, [phone]    Medicare pt has received, reviewed, and signed 1st IM letter informing them of their right to appeal the discharge. Signed copied has been placed on pt bedside chart. Care Management Interventions  PCP Verified by CM: Yes (Dr. Fuentes Gama)  Last Visit to PCP: 09/01/21  Mode of Transport at Discharge:  Other (see comment) (in car with friend)  Transition of Care Consult (CM Consult): Discharge Planning  MyChart Signup: No  Support Systems: Friend/Neighbor, Other Family Member(s) (friends, daughter-in-law)  Confirm Follow Up Transport: Friends  The Plan for Transition of Care is Related to the Following Treatment Goals : home pending medical progress  Discharge Location  Discharge Placement: [address] [place] [address], [address] [place] [address],[address]  [phone]

## 2021-09-23 NOTE — ED NOTES
TRANSFER - OUT REPORT:    Verbal report given to Maritza RN(name) on Kenzie Martinez  being transferred to [facility](unit) for routine progression of care       Report consisted of patients Situation, Background, Assessment and   Recommendations(SBAR). Information from the following report(s) SBAR, ED Summary, MAR, Recent Results and Cardiac Rhythm sinus was reviewed with the receiving nurse. Lines:   Peripheral IV 09/22/21 Anterior Antecubital (Active)   Site Assessment Clean, dry, & intact 09/22/21 1354   Phlebitis Assessment 0 09/22/21 1354   Infiltration Assessment 0 09/22/21 1354   Dressing Status Clean, dry, & intact 09/22/21 1354   Dressing Type Transparent;Tape 09/22/21 1354   Hub Color/Line Status Pink;Flushed;Patent 09/22/21 1354   Action Taken Blood drawn 09/22/21 1354       Peripheral IV 09/22/21 Left Antecubital (Active)   Site Assessment Clean, dry, & intact 09/22/21 2024   Phlebitis Assessment 0 09/22/21 2024   Infiltration Assessment 0 09/22/21 2024   Dressing Status Clean, dry, & intact 09/22/21 2024   Dressing Type Tape;Transparent 09/22/21 2024   Hub Color/Line Status Pink;Patent; Flushed 09/22/21 2024   Action Taken Blood drawn 09/22/21 2024        Opportunity for questions and clarification was provided.       Patient transported with:   Monitor  O2 @ 3 liters

## 2021-09-24 ENCOUNTER — APPOINTMENT (OUTPATIENT)
Dept: NON INVASIVE DIAGNOSTICS | Age: 76
DRG: 189 | End: 2021-09-24
Attending: INTERNAL MEDICINE
Payer: MEDICARE

## 2021-09-24 LAB
ANION GAP SERPL CALC-SCNC: 5 MMOL/L (ref 5–15)
BASOPHILS # BLD: 0 K/UL (ref 0–0.1)
BASOPHILS NFR BLD: 0 % (ref 0–1)
BUN SERPL-MCNC: 18 MG/DL (ref 6–20)
BUN/CREAT SERPL: 20 (ref 12–20)
CALCIUM SERPL-MCNC: 9.3 MG/DL (ref 8.5–10.1)
CHLORIDE SERPL-SCNC: 110 MMOL/L (ref 97–108)
CO2 SERPL-SCNC: 25 MMOL/L (ref 21–32)
CREAT SERPL-MCNC: 0.91 MG/DL (ref 0.55–1.02)
DIFFERENTIAL METHOD BLD: ABNORMAL
ECHO AO ROOT DIAM: 3.19 CM
ECHO AV AREA PEAK VELOCITY: 2.6 CM2
ECHO AV AREA/BSA PEAK VELOCITY: 1.5 CM2/M2
ECHO AV PEAK GRADIENT: 4.1 MMHG
ECHO AV PEAK VELOCITY: 101.27 CM/S
ECHO LA AREA 4C: 17.43 CM2
ECHO LA VOL 2C: 52.59 ML (ref 22–52)
ECHO LA VOL 4C: 44.71 ML (ref 22–52)
ECHO LA VOL BP: 51.82 ML (ref 22–52)
ECHO LA VOL/BSA BIPLANE: 30.13 ML/M2 (ref 16–28)
ECHO LA VOLUME INDEX A2C: 30.58 ML/M2 (ref 16–28)
ECHO LA VOLUME INDEX A4C: 25.99 ML/M2 (ref 16–28)
ECHO LV INTERNAL DIMENSION DIASTOLIC: 4.33 CM (ref 3.9–5.3)
ECHO LV INTERNAL DIMENSION SYSTOLIC: 2.82 CM
ECHO LV IVSD: 1.1 CM (ref 0.6–0.9)
ECHO LV MASS 2D: 153.2 G (ref 67–162)
ECHO LV MASS INDEX 2D: 89.1 G/M2 (ref 43–95)
ECHO LV POSTERIOR WALL DIASTOLIC: 0.99 CM (ref 0.6–0.9)
ECHO LVOT DIAM: 1.93 CM
ECHO LVOT PEAK GRADIENT: 3.26 MMHG
ECHO LVOT PEAK VELOCITY: 90.31 CM/S
ECHO MV A VELOCITY: 76.28 CM/S
ECHO MV AREA PHT: 2.89 CM2
ECHO MV E DECELERATION TIME (DT): 262.05 MS
ECHO MV E VELOCITY: 57.39 CM/S
ECHO MV E/A RATIO: 0.75
ECHO MV PRESSURE HALF TIME (PHT): 75.99 MS
ECHO PV PEAK INSTANTANEOUS GRADIENT SYSTOLIC: 1.32 MMHG
ECHO RV TAPSE: 2.21 CM (ref 1.5–2)
ECHO TV REGURGITANT MAX VELOCITY: 347.84 CM/S
ECHO TV REGURGITANT PEAK GRADIENT: 48.4 MMHG
EOSINOPHIL # BLD: 0 K/UL (ref 0–0.4)
EOSINOPHIL NFR BLD: 0 % (ref 0–7)
ERYTHROCYTE [DISTWIDTH] IN BLOOD BY AUTOMATED COUNT: 14.3 % (ref 11.5–14.5)
GLUCOSE SERPL-MCNC: 128 MG/DL (ref 65–100)
HCT VFR BLD AUTO: 37.5 % (ref 35–47)
HGB BLD-MCNC: 12.3 G/DL (ref 11.5–16)
IMM GRANULOCYTES # BLD AUTO: 0.1 K/UL (ref 0–0.04)
IMM GRANULOCYTES NFR BLD AUTO: 1 % (ref 0–0.5)
LYMPHOCYTES # BLD: 1.1 K/UL (ref 0.8–3.5)
LYMPHOCYTES NFR BLD: 11 % (ref 12–49)
MCH RBC QN AUTO: 32.9 PG (ref 26–34)
MCHC RBC AUTO-ENTMCNC: 32.8 G/DL (ref 30–36.5)
MCV RBC AUTO: 100.3 FL (ref 80–99)
MONOCYTES # BLD: 1.3 K/UL (ref 0–1)
MONOCYTES NFR BLD: 13 % (ref 5–13)
NEUTS SEG # BLD: 7.5 K/UL (ref 1.8–8)
NEUTS SEG NFR BLD: 75 % (ref 32–75)
NRBC # BLD: 0 K/UL (ref 0–0.01)
NRBC BLD-RTO: 0 PER 100 WBC
PLATELET # BLD AUTO: 187 K/UL (ref 150–400)
PMV BLD AUTO: 12.6 FL (ref 8.9–12.9)
POTASSIUM SERPL-SCNC: 3.5 MMOL/L (ref 3.5–5.1)
RBC # BLD AUTO: 3.74 M/UL (ref 3.8–5.2)
SODIUM SERPL-SCNC: 140 MMOL/L (ref 136–145)
WBC # BLD AUTO: 9.9 K/UL (ref 3.6–11)

## 2021-09-24 PROCEDURE — 77030027138 HC INCENT SPIROMETER -A

## 2021-09-24 PROCEDURE — 74011250636 HC RX REV CODE- 250/636: Performed by: INTERNAL MEDICINE

## 2021-09-24 PROCEDURE — 80048 BASIC METABOLIC PNL TOTAL CA: CPT

## 2021-09-24 PROCEDURE — 74011000250 HC RX REV CODE- 250: Performed by: FAMILY MEDICINE

## 2021-09-24 PROCEDURE — 36415 COLL VENOUS BLD VENIPUNCTURE: CPT

## 2021-09-24 PROCEDURE — 93306 TTE W/DOPPLER COMPLETE: CPT

## 2021-09-24 PROCEDURE — 74011250637 HC RX REV CODE- 250/637: Performed by: NURSE PRACTITIONER

## 2021-09-24 PROCEDURE — 65660000000 HC RM CCU STEPDOWN

## 2021-09-24 PROCEDURE — 74011636637 HC RX REV CODE- 636/637: Performed by: HOSPITALIST

## 2021-09-24 PROCEDURE — 85025 COMPLETE CBC W/AUTO DIFF WBC: CPT

## 2021-09-24 PROCEDURE — 74011250636 HC RX REV CODE- 250/636: Performed by: FAMILY MEDICINE

## 2021-09-24 PROCEDURE — 74011250637 HC RX REV CODE- 250/637: Performed by: HOSPITALIST

## 2021-09-24 PROCEDURE — 94640 AIRWAY INHALATION TREATMENT: CPT

## 2021-09-24 PROCEDURE — 74011000250 HC RX REV CODE- 250: Performed by: HOSPITALIST

## 2021-09-24 RX ORDER — FUROSEMIDE 10 MG/ML
20 INJECTION INTRAMUSCULAR; INTRAVENOUS DAILY
Status: DISCONTINUED | OUTPATIENT
Start: 2021-09-24 | End: 2021-09-26

## 2021-09-24 RX ORDER — CODEINE PHOSPHATE AND GUAIFENESIN 10; 100 MG/5ML; MG/5ML
10 SOLUTION ORAL
Status: DISCONTINUED | OUTPATIENT
Start: 2021-09-24 | End: 2021-09-24

## 2021-09-24 RX ORDER — IPRATROPIUM BROMIDE AND ALBUTEROL SULFATE 2.5; .5 MG/3ML; MG/3ML
3 SOLUTION RESPIRATORY (INHALATION)
Status: DISCONTINUED | OUTPATIENT
Start: 2021-09-24 | End: 2021-09-26 | Stop reason: HOSPADM

## 2021-09-24 RX ORDER — GUAIFENESIN 100 MG/5ML
200 SOLUTION ORAL
Status: DISCONTINUED | OUTPATIENT
Start: 2021-09-24 | End: 2021-09-26 | Stop reason: HOSPADM

## 2021-09-24 RX ADMIN — BENZONATATE 100 MG: 100 CAPSULE ORAL at 21:12

## 2021-09-24 RX ADMIN — PREDNISONE 40 MG: 20 TABLET ORAL at 07:04

## 2021-09-24 RX ADMIN — LEVOTHYROXINE SODIUM 88 MCG: 0.09 TABLET ORAL at 06:59

## 2021-09-24 RX ADMIN — Medication 10 ML: at 06:58

## 2021-09-24 RX ADMIN — GUAIFENESIN 200 MG: 200 SOLUTION ORAL at 17:58

## 2021-09-24 RX ADMIN — PREDNISONE 40 MG: 20 TABLET ORAL at 06:56

## 2021-09-24 RX ADMIN — LEVOFLOXACIN 750 MG: 5 INJECTION, SOLUTION INTRAVENOUS at 15:31

## 2021-09-24 RX ADMIN — IPRATROPIUM BROMIDE AND ALBUTEROL SULFATE 3 ML: .5; 3 SOLUTION RESPIRATORY (INHALATION) at 19:52

## 2021-09-24 RX ADMIN — IPRATROPIUM BROMIDE AND ALBUTEROL SULFATE 3 ML: .5; 3 SOLUTION RESPIRATORY (INHALATION) at 01:59

## 2021-09-24 RX ADMIN — Medication 10 ML: at 20:10

## 2021-09-24 RX ADMIN — IPRATROPIUM BROMIDE AND ALBUTEROL SULFATE 3 ML: .5; 3 SOLUTION RESPIRATORY (INHALATION) at 08:36

## 2021-09-24 RX ADMIN — BUDESONIDE 500 MCG: 0.5 INHALANT RESPIRATORY (INHALATION) at 19:52

## 2021-09-24 RX ADMIN — PANTOPRAZOLE SODIUM 40 MG: 40 TABLET, DELAYED RELEASE ORAL at 06:59

## 2021-09-24 RX ADMIN — ENOXAPARIN SODIUM 40 MG: 40 INJECTION SUBCUTANEOUS at 09:14

## 2021-09-24 RX ADMIN — GUAIFENESIN 200 MG: 200 SOLUTION ORAL at 12:07

## 2021-09-24 RX ADMIN — FUROSEMIDE 20 MG: 10 INJECTION, SOLUTION INTRAMUSCULAR; INTRAVENOUS at 12:06

## 2021-09-24 RX ADMIN — BENZONATATE 100 MG: 100 CAPSULE ORAL at 15:32

## 2021-09-24 RX ADMIN — BENZONATATE 100 MG: 100 CAPSULE ORAL at 07:09

## 2021-09-24 RX ADMIN — FUROSEMIDE 40 MG: 10 INJECTION, SOLUTION INTRAMUSCULAR; INTRAVENOUS at 09:14

## 2021-09-24 RX ADMIN — GUAIFENESIN 200 MG: 200 SOLUTION ORAL at 21:13

## 2021-09-24 RX ADMIN — BUDESONIDE 500 MCG: 0.5 INHALANT RESPIRATORY (INHALATION) at 08:35

## 2021-09-24 RX ADMIN — MONTELUKAST 10 MG: 10 TABLET, FILM COATED ORAL at 21:12

## 2021-09-24 NOTE — ROUTINE PROCESS
Bedside and Verbal shift change report got from  [address] [place] [address] (offgoing nurse) at 11:40  p.m. Laura Lewis Report included the following information SBAR, Kardex, Procedure Summary, Intake/Output, MAR, Recent Results, Med Rec Status and Cardiac Rhythm NSR.

## 2021-09-24 NOTE — PROGRESS NOTES
[facility]  Hospitalist Group                                                                                          Hospitalist Progress Note  Savannah Love MD  Answering service: [phone] from in house phone        Date of Service:  2021  NAME:  Jon Mathis  :  1945  MRN:  [id number]      Admission Summary:   Jon Mathis is a 70 y.o. female with a pmhx chronic hypoxic respiratory failure 2/2 COPD, and pulmonary fibrosis, presented to the ED for cough, and worsening dyspnea for the past two weeks. Her sx were associated with intermittent fever Tmax 100.3. Her sx did not improved with amoxicillin, or OTC cough meds. She has not been febrile since presenting to the ED. Interval history / Subjective:   Patient seen and examined today she is coughing even more hacking type of cough  Checking vertebral antibody pulmonary following dose adjustment of cough medication done  Short course of prednisone     Assessment & Plan:     #Acute on Chronic Hypoxic Respiratory Failure  #Pulmonary Fibrosis  #Emphysema/COPD exacerbation  Supplemental oxygen to maintain SPO2 88 to 93%  --Start prednisone 40 mg daily for 5 days and Levaquin  --Wean off oxygen as tolerates  Patient wears nocturnal oxygen chronically, but daytime oxygen requirement is new 6-minute walk test before discharge  CTA thorax negative for PE, or other acute pathology.   Continue scheduled DuoNebs  --Resume home nintedanib capsule for pulmonary fibrosis--pulmonary adjusted the dose  --Hypothyroidism continue levothyroxine  --Cough medication readjusted with guaifenesin codeine and Tessalon    Code status: Full code  DVT prophylaxis: Lovenox    Care Plan discussed with: Patient/Family and Nurse  Anticipated Disposition: Home w/Family  Anticipated Discharge: 24 hours to 48 hours     Hospital Problems  Date Reviewed: 2018        Codes Class Noted POA    SOB (shortness of breath) ICD-10-CM: R06.02  ICD-9-CM: 786.05  9/22/2021 Unknown                Review of Systems:   A comprehensive review of systems was negative. Vital Signs:    Last 24hrs VS reviewed since prior progress note. Most recent are:  Visit Vitals  /72 (BP 1 Location: Right upper arm, BP Patient Position: At rest)   Pulse 83   Temp 98.4 °F (36.9 °C)   Resp 21   Ht 5' 1\" (1.549 m)   Wt 73 kg (160 lb 15 oz)   SpO2 95%   BMI 30.41 kg/m²         Intake/Output Summary (Last 24 hours) at 9/24/2021 1303  Last data filed at 9/23/2021 2101  Gross per 24 hour   Intake    Output 1200 ml   Net -1200 ml        Physical Examination:     I had a face to face encounter with this patient and independently examined them on 9/24/2021 as outlined below:          Constitutional:  No acute distress, cooperative, pleasant  hacking cough   ENT:  MMM   Resp:  Few scattered wheezing   CV:  Regular rhythm, normal rate, tachycardia    GI:  Soft, non distended, non tender. Bowel sounds positive    Musculoskeletal:  No edema, warm, 2+ pulses throughout    Neurologic:  Moves all extremities. AAOx3, CN II-XII reviewed            Data Review:    I personally reviewed  Image and labs      Labs:     Recent Labs     09/24/21  0419 09/23/21  0243   WBC 9.9 6.2   HGB 12.3 13.0   HCT 37.5 39.4    196     Recent Labs     09/24/21  0419 09/23/21  0243 09/22/21  1346    139 141   K 3.5 4.4 4.0   * 110* 105   CO2 25 22 24   BUN 18 15 24*   CREA 0.91 0.66 0.89   * 156* 96   CA 9.3 9.3 9.3     Recent Labs     09/22/21  1346   ALT 28   *   TBILI 0.4   TP 7.8   ALB 3.6   GLOB 4.2*     No results for input(s): INR, PTP, APTT, INREXT, INREXT in the last 72 hours. No results for input(s): FE, TIBC, PSAT, FERR in the last 72 hours. No results found for: FOL, RBCF   No results for input(s): PH, PCO2, PO2 in the last 72 hours. No results for input(s): CPK, CKNDX, TROIQ in the last 72 hours.     No lab exists for component: CPKMB  No results found for: CHOL, CHOLX, CHLST, CHOLV, HDL, HDLP, LDL, LDLC, DLDLP, TGLX, TRIGL, TRIGP, CHHD, CHHDX  No results found for: [facility]  Lab Results   Component Value Date/Time    Color YELLOW/STRAW 09/22/2021 03:34 PM    Appearance CLOUDY (A) 09/22/2021 03:34 PM    Specific gravity 1.010 09/22/2021 03:34 PM    pH (UA) 5.5 09/22/2021 03:34 PM    Protein Negative 09/22/2021 03:34 PM    Glucose Negative 09/22/2021 03:34 PM    Ketone Negative 09/22/2021 03:34 PM    Bilirubin Negative 09/22/2021 03:34 PM    Urobilinogen 0.2 09/22/2021 03:34 PM    Nitrites Negative 09/22/2021 03:34 PM    Leukocyte Esterase SMALL (A) 09/22/2021 03:34 PM    Epithelial cells FEW 09/22/2021 03:34 PM    Bacteria Negative 09/22/2021 03:34 PM    WBC 10-20 09/22/2021 03:34 PM    RBC 0-5 09/22/2021 03:34 PM         Medications Reviewed:     Current Facility-Administered Medications   Medication Dose Route Frequency    nintedanib cap 150 mg (Patient Supplied)  150 mg Oral BID    guaiFENesin (ROBITUSSIN) 100 mg/5 mL oral liquid 200 mg  200 mg Oral Q4H PRN    albuterol-ipratropium (DUO-NEB) 2.5 MG-0.5 MG/3 ML  3 mL Nebulization BID RT    furosemide (LASIX) injection 20 mg  20 mg IntraVENous DAILY    sodium chloride (NS) flush 5-40 mL  5-40 mL IntraVENous Q8H    sodium chloride (NS) flush 5-40 mL  5-40 mL IntraVENous PRN    acetaminophen (TYLENOL) tablet 650 mg  650 mg Oral Q6H PRN    Or    acetaminophen (TYLENOL) suppository 650 mg  650 mg Rectal Q6H PRN    ondansetron (ZOFRAN ODT) tablet 4 mg  4 mg Oral Q8H PRN    Or    ondansetron (ZOFRAN) injection 4 mg  4 mg IntraVENous Q6H PRN    enoxaparin (LOVENOX) injection 40 mg  40 mg SubCUTAneous DAILY    predniSONE (DELTASONE) tablet 40 mg  40 mg Oral DAILY WITH BREAKFAST    benzonatate (TESSALON) capsule 100 mg  100 mg Oral TID PRN    budesonide (PULMICORT) 500 mcg/2 ml nebulizer suspension  500 mcg Nebulization BID RT    levothyroxine (SYNTHROID) tablet 88 mcg  88 mcg Oral ACB    montelukast (SINGULAIR) tablet 10 mg  10 mg Oral QHS    levoFLOXacin (LEVAQUIN) 750 mg in D5W IVPB  750 mg IntraVENous Q24H    pantoprazole (PROTONIX) tablet 40 mg  40 mg Oral ACB     ______________________________________________________________________  EXPECTED LENGTH OF STAY: 3d 14h  ACTUAL LENGTH OF STAY:          2                 Libertad Garner MD

## 2021-09-24 NOTE — PROGRESS NOTES
Bedside shift change report given to Barb RN (oncoming nurse) by RAHEEM Barbosa RN (offgoing nurse).  Report included the following information SBAR, Kardex and Cardiac Rhythm SR.

## 2021-09-24 NOTE — PROGRESS NOTES
Pulmonary, Critical Care, and Sleep Medicine~Consult Note    Name: Franklyn Limon MRN: [id number]   : 1945 Hospital: [facility]    Date: 2021 12:27 PM Admission: 2021     Impression Plan   1. Hypoxemic respiratory failure. Baseline O2 requirement 2 lpm Noctrunal.  2. Acute sinusitis. 3. Intractable cough. 4. Allergic rhinitis - on allergy shots. 5. 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 6. Pulmonary hypertension with paps 46 mmhg - mild. 1. Levaquin. 2. Check bordetella ab.  3. Guaifenesin codeine and Tessalon pearls. 4. O2 supplementation to keep sats above 90%. On 2 lpm by NC.  5. 6MWT before dischrge from the hospital.  6. Echo pending. 7. On prednisone. Will do a short course. 8. OFEV 100 mg q daily - reduced dose due to GI side effects. 9. Compared CT Chest from study 2020 - increased scarring with ggo. Fluid vs pneumonia vs progression of disease. Fever pm admit. 10. Sputum culture if not already done. Daily progress note:     -  Good diuresis with lasix. O2 req 2 lpm by NC at rest.  Cough with clear sputum. Consultation note:    Consultation requested by Dr Dina Fuentes. Reason for the consultation - hypoxemic respiratory failure. 70year old male with past medical history of CPFE who presented to [facility] with increasing cough and shortness of breath. Patient is followed in our office by Dr Konrad Merino. For the past 2 months has been complaining of cough with nasal drip. Treated as out patient with amoxicillin without any improvement. She does have hx of allergies and receives allergy shots. For the past 1 week she has cough productive of scant and clear sputum along with increased shortness of breath. Yesterday she could not breath and came to the hospital with fatigue, tiredness and feeling very cold with sweating.  Temp in the Ed was 101 and she was admitted to the hospital. Her O2 sats were noted to be in 80's. She has complaints of sinus pain, headaches and post nasal drip that makes her choke. From pulmonary stand point - she has ILD and COPD (CPFE). Currently on OFEV for the past 2 years. She also uses O2 2 lpm by Nc at night. Out patient PFT reviewed. No obs, No restriction. DLCO severly reduced. Declining DLCO for the past 3 years. I have reviewed the labs and previous days notes. A comprehensive review of systems was negative except for: Respiratory: positive for cough or dyspnea on exertion  Past Medical History:   Diagnosis Date    Adverse effect of anesthesia     vocal cord swelling with bronchoscopy - was given lidocaine for bronchoscopy - unsure if lidocaine this caused the swelling/\"put me out the second time\" and did not use lidocaine and there no problem    COPD (chronic obstructive pulmonary disease) (HCC)     does not use oxygen, \"mild COPD\" per pt, CXR states otherwise    Ill-defined condition     uses 2 liters of oxygen    Ill-defined condition     pulmonary fibrosis    Menopause     Pulmonary fibrosis ([facility] [address]     Thyroid disease     hypothryoid      Past Surgical History:   Procedure Laterality Date    COLONOSCOPY N/A 12/3/2018    COLONOSCOPY performed by Riki Viera MD at [address] [place] [address] N/A 4/22/2021    COLONOSCOPY   :- performed by Cha Chavez MD at [facility] ENDOSCOPY    HX CHOLECYSTECTOMY      HX COLONOSCOPY  9.2015    polyps x2    HX COLONOSCOPY  12/2018    polyp    HX OTHER SURGICAL      attempted bronchoscopy and one completed    HX TONSILLECTOMY      HX TUBAL LIGATION        Prior to Admission medications    Medication Sig Start Date End Date Taking? Authorizing Provider   tiotropium bromide (Spiriva Respimat) 2.5 mcg/actuation inhaler Take 2 Puffs by inhalation daily. Provider, Historical   fexofenadine (ALLEGRA) 180 mg tablet Take 180 mg by mouth daily.     Provider, Historical   cyanocobalamin (VITAMIN B-12) 1,000 mcg sublingual tablet Take 1,000 mcg by mouth daily. Provider, Historical   zinc 50 mg tab tablet Take 50 mg by mouth daily. Provider, Historical   GREEN TEA EXTRACT PO Take 1,000 mg by mouth daily. Provider, Historical   acetylcysteine (NAC) 600 mg cap capsule Take 600 mg by mouth daily. Provider, Historical   nintedanib (Ofev) 150 mg cap Take 150 mg by mouth two (2) times a day. Provider, Historical   cod liver oil cap Take 1 Cap by mouth daily. [place] cod liver oil    Provider, Historical   omeprazole (PRILOSEC) 40 mg capsule Take 40 mg by mouth daily. Takes one po in the afternoon. Provider, Historical   OTHER Cordyceps for respiratory health and immune system. Takes 1000 mg once daily. Provider, Historical   Ascorbic Acid-Multivits-Min (Emergen-C) 1,000 mg pwep Take 1,000 mg by mouth daily. Provider, Historical   MULTIVITAMIN PO Take  by mouth. Takes 2 packets with 3 pills each once a day. Has vitamin D3, turmeric, and omega 3. Provider, Historical   COLLAGEN Take  by mouth. Powder. Takes occasionally. Mixes with emergen C occasionally. Provider, Historical   famotidine (PEPCID) 20 mg tablet Take 20 mg by mouth every morning. Provider, Historical   montelukast (SINGULAIR) 10 mg tablet Take 10 mg by mouth daily. Provider, Historical   levothyroxine (SYNTHROID) 88 mcg tablet Take 88 mcg by mouth Daily (before breakfast).     Provider, Historical     No Known Allergies   Social History     Tobacco Use    Smoking status: Former Smoker     Types: Cigarettes    Smokeless tobacco: Never Used    Tobacco comment: 10-15 yrs ago   Substance Use Topics    Alcohol use: Yes     Comment: 1-2 glasses of wine at night      Family History   Problem Relation Age of Onset    Diabetes Mother     Breast Cancer Maternal Aunt     Pulmonary Embolism Maternal Aunt     Breast Cancer Paternal Aunt     Other Brother         colon disease - part of colon removed/was not cancer/had a \"bag\" OBJECTIVE:     Vital Signs:       Visit Vitals  /75 (BP 1 Location: Right upper arm, BP Patient Position: At rest)   Pulse (!) 106   Temp 98.5 °F (36.9 °C)   Resp 17   Ht 5' 1\" (1.549 m) Comment: last value documented   Wt 73 kg (160 lb 15 oz)   SpO2 95%   BMI 30.41 kg/m²      Temp (24hrs), Av.4 °F (36.9 °C), Min:97.8 °F (36.6 °C), Max:98.7 °F (37.1 °C)     Intake/Output:     Last shift: No intake/output data recorded.     Last 3 shifts:  1901 -  0700  In: 250 [I.V.:250]  Out: 1200 [Urine:1200]          Intake/Output Summary (Last 24 hours) at 2021 1107  Last data filed at 2021 2101  Gross per 24 hour   Intake    Output 1200 ml   Net -1200 ml       Physical Exam:                                        Exam Findings Other   General: No resp distress noted, appears stated age    [de-identified]:  No ulcers, JVD not elevated, no cervical LAD    Chest: No pectus deformity, normal chest rise b/l    HEART:  RRR, no murmurs/rubs/gallops    Lungs:  B/l crackles    ABD: Soft/NT, non rigid mildly distended    EXT: No cyanosis/clubbing/edema, normal peripheral pulses    Skin: No rashes or ulcers, no mottling    Neuro: A/O x 3        Medications:  Current Facility-Administered Medications   Medication Dose Route Frequency    nintedanib cap 150 mg (Patient Supplied)  150 mg Oral BID    guaiFENesin (ROBITUSSIN) 100 mg/5 mL oral liquid 200 mg  200 mg Oral Q4H PRN    albuterol-ipratropium (DUO-NEB) 2.5 MG-0.5 MG/3 ML  3 mL Nebulization BID RT    sodium chloride (NS) flush 5-40 mL  5-40 mL IntraVENous Q8H    sodium chloride (NS) flush 5-40 mL  5-40 mL IntraVENous PRN    acetaminophen (TYLENOL) tablet 650 mg  650 mg Oral Q6H PRN    Or    acetaminophen (TYLENOL) suppository 650 mg  650 mg Rectal Q6H PRN    ondansetron (ZOFRAN ODT) tablet 4 mg  4 mg Oral Q8H PRN    Or    ondansetron (ZOFRAN) injection 4 mg  4 mg IntraVENous Q6H PRN    enoxaparin (LOVENOX) injection 40 mg  40 mg SubCUTAneous DAILY    predniSONE (DELTASONE) tablet 40 mg  40 mg Oral DAILY WITH BREAKFAST    benzonatate (TESSALON) capsule 100 mg  100 mg Oral TID PRN    budesonide (PULMICORT) 500 mcg/2 ml nebulizer suspension  500 mcg Nebulization BID RT    levothyroxine (SYNTHROID) tablet 88 mcg  88 mcg Oral ACB    montelukast (SINGULAIR) tablet 10 mg  10 mg Oral QHS    levoFLOXacin (LEVAQUIN) 750 mg in D5W IVPB  750 mg IntraVENous Q24H    pantoprazole (PROTONIX) tablet 40 mg  40 mg Oral ACB       Labs:  ABG No results for input(s): PHI, PCO2I, PO2I, HCO3I, SO2I, FIO2I in the last 72 hours.      CBC Recent Labs     09/24/21 0419 09/23/21 0243 09/22/21  1346   WBC 9.9 6.2 10.6   HGB 12.3 13.0 13.2   HCT 37.5 39.4 40.3    196 193   .3* 100.0* 99.5*   MCH 32.9 33.0 95.8        Metabolic  Panel Recent Labs     09/24/21 0419 09/23/21  0243 09/22/21  1346    139 141   K 3.5 4.4 4.0   * 110* 105   CO2 25 22 24   * 156* 96   BUN 18 15 24*   CREA 0.91 0.66 0.89   CA 9.3 9.3 9.3   ALB  --   --  3.6   ALT  --   --  28        Pertinent Carlitos Lam MD  9/24/2021

## 2021-09-25 LAB
ANION GAP SERPL CALC-SCNC: 5 MMOL/L (ref 5–15)
BASOPHILS # BLD: 0 K/UL (ref 0–0.1)
BASOPHILS NFR BLD: 0 % (ref 0–1)
BUN SERPL-MCNC: 22 MG/DL (ref 6–20)
BUN/CREAT SERPL: 22 (ref 12–20)
CALCIUM SERPL-MCNC: 10.7 MG/DL (ref 8.5–10.1)
CHLORIDE SERPL-SCNC: 108 MMOL/L (ref 97–108)
CO2 SERPL-SCNC: 25 MMOL/L (ref 21–32)
CREAT SERPL-MCNC: 1 MG/DL (ref 0.55–1.02)
DIFFERENTIAL METHOD BLD: ABNORMAL
EOSINOPHIL # BLD: 0 K/UL (ref 0–0.4)
EOSINOPHIL NFR BLD: 0 % (ref 0–7)
ERYTHROCYTE [DISTWIDTH] IN BLOOD BY AUTOMATED COUNT: 14.1 % (ref 11.5–14.5)
GLUCOSE SERPL-MCNC: 100 MG/DL (ref 65–100)
HCT VFR BLD AUTO: 40.7 % (ref 35–47)
HGB BLD-MCNC: 13.3 G/DL (ref 11.5–16)
IMM GRANULOCYTES # BLD AUTO: 0 K/UL (ref 0–0.04)
IMM GRANULOCYTES NFR BLD AUTO: 1 % (ref 0–0.5)
LYMPHOCYTES # BLD: 2.1 K/UL (ref 0.8–3.5)
LYMPHOCYTES NFR BLD: 24 % (ref 12–49)
MCH RBC QN AUTO: 32.3 PG (ref 26–34)
MCHC RBC AUTO-ENTMCNC: 32.7 G/DL (ref 30–36.5)
MCV RBC AUTO: 98.8 FL (ref 80–99)
MONOCYTES # BLD: 1.2 K/UL (ref 0–1)
MONOCYTES NFR BLD: 14 % (ref 5–13)
NEUTS SEG # BLD: 5.2 K/UL (ref 1.8–8)
NEUTS SEG NFR BLD: 61 % (ref 32–75)
NRBC # BLD: 0 K/UL (ref 0–0.01)
NRBC BLD-RTO: 0 PER 100 WBC
PLATELET # BLD AUTO: 206 K/UL (ref 150–400)
PMV BLD AUTO: 12.2 FL (ref 8.9–12.9)
POTASSIUM SERPL-SCNC: 3.4 MMOL/L (ref 3.5–5.1)
RBC # BLD AUTO: 4.12 M/UL (ref 3.8–5.2)
SODIUM SERPL-SCNC: 138 MMOL/L (ref 136–145)
TSH SERPL DL<=0.05 MIU/L-ACNC: 3.16 UIU/ML (ref 0.36–3.74)
WBC # BLD AUTO: 8.6 K/UL (ref 3.6–11)

## 2021-09-25 PROCEDURE — 74011250636 HC RX REV CODE- 250/636: Performed by: FAMILY MEDICINE

## 2021-09-25 PROCEDURE — 74011250637 HC RX REV CODE- 250/637: Performed by: HOSPITALIST

## 2021-09-25 PROCEDURE — 74011000250 HC RX REV CODE- 250: Performed by: FAMILY MEDICINE

## 2021-09-25 PROCEDURE — 77030029684 HC NEB SM VOL KT MONA -A

## 2021-09-25 PROCEDURE — 84443 ASSAY THYROID STIM HORMONE: CPT

## 2021-09-25 PROCEDURE — 65270000029 HC RM PRIVATE

## 2021-09-25 PROCEDURE — 74011250637 HC RX REV CODE- 250/637: Performed by: NURSE PRACTITIONER

## 2021-09-25 PROCEDURE — 85025 COMPLETE CBC W/AUTO DIFF WBC: CPT

## 2021-09-25 PROCEDURE — 36415 COLL VENOUS BLD VENIPUNCTURE: CPT

## 2021-09-25 PROCEDURE — 94760 N-INVAS EAR/PLS OXIMETRY 1: CPT

## 2021-09-25 PROCEDURE — 80048 BASIC METABOLIC PNL TOTAL CA: CPT

## 2021-09-25 PROCEDURE — 74011250637 HC RX REV CODE- 250/637: Performed by: INTERNAL MEDICINE

## 2021-09-25 PROCEDURE — 74011636637 HC RX REV CODE- 636/637: Performed by: HOSPITALIST

## 2021-09-25 PROCEDURE — 74011000250 HC RX REV CODE- 250: Performed by: HOSPITALIST

## 2021-09-25 PROCEDURE — 74011250636 HC RX REV CODE- 250/636: Performed by: INTERNAL MEDICINE

## 2021-09-25 PROCEDURE — 77010033678 HC OXYGEN DAILY

## 2021-09-25 PROCEDURE — 94640 AIRWAY INHALATION TREATMENT: CPT

## 2021-09-25 RX ORDER — LEVOFLOXACIN 5 MG/ML
750 INJECTION, SOLUTION INTRAVENOUS
Status: DISCONTINUED | OUTPATIENT
Start: 2021-09-26 | End: 2021-09-26 | Stop reason: HOSPADM

## 2021-09-25 RX ORDER — POTASSIUM CHLORIDE 750 MG/1
20 TABLET, FILM COATED, EXTENDED RELEASE ORAL
Status: COMPLETED | OUTPATIENT
Start: 2021-09-25 | End: 2021-09-25

## 2021-09-25 RX ADMIN — GUAIFENESIN 200 MG: 200 SOLUTION ORAL at 14:13

## 2021-09-25 RX ADMIN — BENZONATATE 100 MG: 100 CAPSULE ORAL at 07:00

## 2021-09-25 RX ADMIN — BUDESONIDE 500 MCG: 0.5 INHALANT RESPIRATORY (INHALATION) at 21:12

## 2021-09-25 RX ADMIN — LEVOTHYROXINE SODIUM 88 MCG: 0.09 TABLET ORAL at 07:00

## 2021-09-25 RX ADMIN — BUDESONIDE 500 MCG: 0.5 INHALANT RESPIRATORY (INHALATION) at 08:24

## 2021-09-25 RX ADMIN — ENOXAPARIN SODIUM 40 MG: 40 INJECTION SUBCUTANEOUS at 09:10

## 2021-09-25 RX ADMIN — IPRATROPIUM BROMIDE AND ALBUTEROL SULFATE 3 ML: .5; 3 SOLUTION RESPIRATORY (INHALATION) at 21:12

## 2021-09-25 RX ADMIN — IPRATROPIUM BROMIDE AND ALBUTEROL SULFATE 3 ML: .5; 3 SOLUTION RESPIRATORY (INHALATION) at 08:24

## 2021-09-25 RX ADMIN — Medication 10 ML: at 07:00

## 2021-09-25 RX ADMIN — GUAIFENESIN 200 MG: 200 SOLUTION ORAL at 04:05

## 2021-09-25 RX ADMIN — PANTOPRAZOLE SODIUM 40 MG: 40 TABLET, DELAYED RELEASE ORAL at 07:00

## 2021-09-25 RX ADMIN — BENZONATATE 100 MG: 100 CAPSULE ORAL at 16:19

## 2021-09-25 RX ADMIN — Medication 5 ML: at 14:00

## 2021-09-25 RX ADMIN — PREDNISONE 40 MG: 20 TABLET ORAL at 06:59

## 2021-09-25 RX ADMIN — PREDNISONE 40 MG: 20 TABLET ORAL at 08:00

## 2021-09-25 RX ADMIN — GUAIFENESIN 200 MG: 200 SOLUTION ORAL at 17:43

## 2021-09-25 RX ADMIN — FUROSEMIDE 20 MG: 10 INJECTION, SOLUTION INTRAMUSCULAR; INTRAVENOUS at 10:17

## 2021-09-25 RX ADMIN — POTASSIUM CHLORIDE 20 MEQ: 750 TABLET, FILM COATED, EXTENDED RELEASE ORAL at 09:10

## 2021-09-25 NOTE — ROUTINE PROCESS
TRANSFER - OUT REPORT:    Verbal report given to RN on Mary Maximilian  being transferred to (unit) for routine progression of care       Report consisted of patients Situation, Background, Assessment and   Recommendations(SBAR). Information from the following report(s) SBAR, Kardex and MAR was reviewed with the receiving nurse. Lines:   Peripheral IV 09/25/21 Right Wrist (Active)        Opportunity for questions and clarification was provided.       Patient transported with:   O2 @ 3 liters

## 2021-09-25 NOTE — PROGRESS NOTES
Problem: Breathing Pattern - Ineffective  Goal: *Absence of hypoxia  Outcome: Progressing Towards Goal  Goal: *Use of effective breathing techniques  Outcome: Progressing Towards Goal  Goal: *PALLIATIVE CARE:  Alleviation of Dyspnea  Outcome: Progressing Towards Goal     Problem: Discharge Planning  Goal: *Discharge to safe environment  Outcome: Progressing Towards Goal  Goal: *Knowledge of medication management  Outcome: Progressing Towards Goal  Goal: *Knowledge of discharge instructions  Outcome: Progressing Towards Goal

## 2021-09-25 NOTE — PROGRESS NOTES
Day #3 of Levaquin - Renal Dosing Update  Indication:  COPD exacerbation  Current regimen:  750 mg IV Q 24 hr  Abx regimen: Monotherapy  Recent Labs     21  0413 21  0419 21  0243   WBC 8.6 9.9 6.2   CREA 1.00 0.91 0.66   BUN 22* 18 15     Est CrCl: ~40-50 ml/min; UO: -- ml/kg/hr  Temp (24hrs), Av.6 °F (37 °C), Min:97.8 °F (36.6 °C), Max:99 °F (37.2 °C)    Cultures:    Blood: NGTD   Resp viral panel: (-)    Plan: Given the patient's worsening Scr (CrCl now <50 ml/min), the dose of Levaquin has been adjusted to 750 mg IV Q 48 hr per [facility] P&T Committee Protocol with respect to renal function. Pharmacy will continue to monitor patient daily and will make dosage adjustments based upon changing renal function.

## 2021-09-25 NOTE — PROGRESS NOTES
[facility] [facility] Adult  Hospitalist Group                                                                                          Hospitalist Progress Note  Raimundo Diaz MD  Answering service: [phone] OR 36 from in house phone        Date of Service:  2021  NAME:  Alex Cherry  :  1945  MRN:  [id number]      Admission Summary:   Alex Cherry is a 70 y.o. female with a pmhx chronic hypoxic respiratory failure 2/2 COPD, and pulmonary fibrosis, presented to the ED for cough, and worsening dyspnea for the past two weeks. Her sx were associated with intermittent fever Tmax 100.3. Her sx did not improved with amoxicillin, or OTC cough meds. She has not been febrile since presenting to the ED. Interval history / Subjective:   Patient seen and examined, continues to have a hacking cough although clinically improving  Checking Bordetella antibody pulmonary following dose adjustment of cough medication done  Short course of prednisone, remains on 2 L oxygen     Assessment & Plan:     #Acute on Chronic Hypoxic Respiratory Failure  #Pulmonary Fibrosis  #Emphysema/COPD exacerbation  Supplemental oxygen to maintain SPO2 88 to 93%  --Continue prednisone 40 mg daily for 5 days and Levaquin  --Wean off oxygen as tolerates  Patient wears nocturnal oxygen chronically, but daytime oxygen requirement is new 6-minute walk test before discharge  CTA thorax negative for PE, or other acute pathology. Continue scheduled DuoNebs  --Resume home nintedanib capsule for pulmonary fibrosis--pulmonary adjusted the dose  --Hypothyroidism continue levothyroxine  --Cough medication readjusted with guaifenesin codeine and Tessalon  -Echocardiogram done showing    · LV: Estimated LVEF is 60 - 65%. Normal cavity size and systolic function (ejection fraction normal). Upper normal wall thickness.  Mild (grade 1) left ventricular diastolic dysfunction Left ventricular diastolic dysfunction due to impaired relaxation. · TV: Mild tricuspid valve regurgitation is present. Right Ventricular Arterial Pressure (RVSP) is 40 mmHg. Pulmonary hypertension found to be mild. Code status: Full code  DVT prophylaxis: Lovenox    Care Plan discussed with: Patient/Family and Nurse  Anticipated Disposition: Home w/Family  Anticipated Discharge: 24 hours to 48 hours     Hospital Problems  Date Reviewed: 12/11/2018        Codes Class Noted POA    SOB (shortness of breath) ICD-10-CM: R06.02  ICD-9-CM: 786.05  9/22/2021 Unknown                Review of Systems:   A comprehensive review of systems was negative. Vital Signs:    Last 24hrs VS reviewed since prior progress note. Most recent are:  Visit Vitals  BP (!) 174/63 (BP 1 Location: Left upper arm, BP Patient Position: At rest)   Pulse (!) 48   Temp 97.8 °F (36.6 °C)   Resp 17   Ht 5' 1\" (1.549 m)   Wt 71.2 kg (156 lb 15.5 oz)   SpO2 93%   BMI 29.66 kg/m²       No intake or output data in the 24 hours ending 09/25/21 7812     Physical Examination:     I had a face to face encounter with this patient and independently examined them on 9/25/2021 as outlined below:          Constitutional:  No acute distress, cooperative, pleasant  hacking cough   ENT:  MMM   Resp:  Few scattered wheezing   CV:  Regular rhythm, normal rate, tachycardia    GI:  Soft, non distended, non tender. Bowel sounds positive    Musculoskeletal:  No edema, warm, 2+ pulses throughout    Neurologic:  Moves all extremities.   AAOx3, CN II-XII reviewed            Data Review:    I personally reviewed  Image and labs      Labs:     Recent Labs     09/25/21  0413 09/24/21 0419   WBC 8.6 9.9   HGB 13.3 12.3   HCT 40.7 37.5    187     Recent Labs     09/25/21  0413 09/24/21  0419 09/23/21  0243    140 139   K 3.4* 3.5 4.4    110* 110*   CO2 25 25 22   BUN 22* 18 15   CREA 1.00 0.91 0.66    128* 156*   CA 10.7* 9.3 9.3     Recent Labs     09/22/21  1346   ALT 28   *   TBILI 0.4   TP 7. 8   ALB 3.6   GLOB 4.2*     No results for input(s): INR, PTP, APTT, INREXT, INREXT in the last 72 hours. No results for input(s): FE, TIBC, PSAT, FERR in the last 72 hours. No results found for: FOL, RBCF   No results for input(s): PH, PCO2, PO2 in the last 72 hours. No results for input(s): CPK, CKNDX, TROIQ in the last 72 hours.     No lab exists for component: CPKMB  No results found for: CHOL, CHOLX, CHLST, CHOLV, HDL, HDLP, LDL, LDLC, DLDLP, TGLX, TRIGL, TRIGP, CHHD, CHHDX  No results found for: [facility]  Lab Results   Component Value Date/Time    Color YELLOW/STRAW 09/22/2021 03:34 PM    Appearance CLOUDY (A) 09/22/2021 03:34 PM    Specific gravity 1.010 09/22/2021 03:34 PM    pH (UA) 5.5 09/22/2021 03:34 PM    Protein Negative 09/22/2021 03:34 PM    Glucose Negative 09/22/2021 03:34 PM    Ketone Negative 09/22/2021 03:34 PM    Bilirubin Negative 09/22/2021 03:34 PM    Urobilinogen 0.2 09/22/2021 03:34 PM    Nitrites Negative 09/22/2021 03:34 PM    Leukocyte Esterase SMALL (A) 09/22/2021 03:34 PM    Epithelial cells FEW 09/22/2021 03:34 PM    Bacteria Negative 09/22/2021 03:34 PM    WBC 10-20 09/22/2021 03:34 PM    RBC 0-5 09/22/2021 03:34 PM         Medications Reviewed:     Current Facility-Administered Medications   Medication Dose Route Frequency    nintedanib cap 150 mg (Patient Supplied)  150 mg Oral BID    guaiFENesin (ROBITUSSIN) 100 mg/5 mL oral liquid 200 mg  200 mg Oral Q4H PRN    albuterol-ipratropium (DUO-NEB) 2.5 MG-0.5 MG/3 ML  3 mL Nebulization BID RT    furosemide (LASIX) injection 20 mg  20 mg IntraVENous DAILY    sodium chloride (NS) flush 5-40 mL  5-40 mL IntraVENous Q8H    sodium chloride (NS) flush 5-40 mL  5-40 mL IntraVENous PRN    acetaminophen (TYLENOL) tablet 650 mg  650 mg Oral Q6H PRN    Or    acetaminophen (TYLENOL) suppository 650 mg  650 mg Rectal Q6H PRN    ondansetron (ZOFRAN ODT) tablet 4 mg  4 mg Oral Q8H PRN    Or    ondansetron (ZOFRAN) injection 4 mg  4 mg IntraVENous Q6H PRN    enoxaparin (LOVENOX) injection 40 mg  40 mg SubCUTAneous DAILY    predniSONE (DELTASONE) tablet 40 mg  40 mg Oral DAILY WITH BREAKFAST    benzonatate (TESSALON) capsule 100 mg  100 mg Oral TID PRN    budesonide (PULMICORT) 500 mcg/2 ml nebulizer suspension  500 mcg Nebulization BID RT    levothyroxine (SYNTHROID) tablet 88 mcg  88 mcg Oral ACB    montelukast (SINGULAIR) tablet 10 mg  10 mg Oral QHS    levoFLOXacin (LEVAQUIN) 750 mg in D5W IVPB  750 mg IntraVENous Q24H    pantoprazole (PROTONIX) tablet 40 mg  40 mg Oral ACB     ______________________________________________________________________  EXPECTED LENGTH OF STAY: 3d 14h  ACTUAL LENGTH OF STAY:          3                 Arnold Velazquez MD

## 2021-09-26 VITALS
DIASTOLIC BLOOD PRESSURE: 75 MMHG | TEMPERATURE: 98.3 F | WEIGHT: 165.12 LBS | BODY MASS INDEX: 31.18 KG/M2 | RESPIRATION RATE: 18 BRPM | OXYGEN SATURATION: 92 % | HEIGHT: 61 IN | SYSTOLIC BLOOD PRESSURE: 133 MMHG | HEART RATE: 68 BPM

## 2021-09-26 LAB
ANION GAP SERPL CALC-SCNC: 4 MMOL/L (ref 5–15)
BASOPHILS # BLD: 0 K/UL (ref 0–0.1)
BASOPHILS NFR BLD: 0 % (ref 0–1)
BUN SERPL-MCNC: 23 MG/DL (ref 6–20)
BUN/CREAT SERPL: 29 (ref 12–20)
CALCIUM SERPL-MCNC: 9.8 MG/DL (ref 8.5–10.1)
CHLORIDE SERPL-SCNC: 108 MMOL/L (ref 97–108)
CO2 SERPL-SCNC: 25 MMOL/L (ref 21–32)
CREAT SERPL-MCNC: 0.8 MG/DL (ref 0.55–1.02)
DIFFERENTIAL METHOD BLD: ABNORMAL
EOSINOPHIL # BLD: 0 K/UL (ref 0–0.4)
EOSINOPHIL NFR BLD: 1 % (ref 0–7)
ERYTHROCYTE [DISTWIDTH] IN BLOOD BY AUTOMATED COUNT: 13.8 % (ref 11.5–14.5)
GLUCOSE BLD STRIP.AUTO-MCNC: 121 MG/DL (ref 65–117)
GLUCOSE SERPL-MCNC: 116 MG/DL (ref 65–100)
HCT VFR BLD AUTO: 42.3 % (ref 35–47)
HGB BLD-MCNC: 13.8 G/DL (ref 11.5–16)
IMM GRANULOCYTES # BLD AUTO: 0 K/UL (ref 0–0.04)
IMM GRANULOCYTES NFR BLD AUTO: 1 % (ref 0–0.5)
LYMPHOCYTES # BLD: 2.4 K/UL (ref 0.8–3.5)
LYMPHOCYTES NFR BLD: 27 % (ref 12–49)
MCH RBC QN AUTO: 32.8 PG (ref 26–34)
MCHC RBC AUTO-ENTMCNC: 32.6 G/DL (ref 30–36.5)
MCV RBC AUTO: 100.5 FL (ref 80–99)
MONOCYTES # BLD: 1.4 K/UL (ref 0–1)
MONOCYTES NFR BLD: 15 % (ref 5–13)
NEUTS SEG # BLD: 5 K/UL (ref 1.8–8)
NEUTS SEG NFR BLD: 56 % (ref 32–75)
NRBC # BLD: 0 K/UL (ref 0–0.01)
NRBC BLD-RTO: 0 PER 100 WBC
PLATELET # BLD AUTO: 200 K/UL (ref 150–400)
PMV BLD AUTO: 12.6 FL (ref 8.9–12.9)
POTASSIUM SERPL-SCNC: 3.8 MMOL/L (ref 3.5–5.1)
RBC # BLD AUTO: 4.21 M/UL (ref 3.8–5.2)
SERVICE CMNT-IMP: ABNORMAL
SODIUM SERPL-SCNC: 137 MMOL/L (ref 136–145)
WBC # BLD AUTO: 8.8 K/UL (ref 3.6–11)

## 2021-09-26 PROCEDURE — 74011000250 HC RX REV CODE- 250: Performed by: FAMILY MEDICINE

## 2021-09-26 PROCEDURE — 82962 GLUCOSE BLOOD TEST: CPT

## 2021-09-26 PROCEDURE — 74011000250 HC RX REV CODE- 250: Performed by: HOSPITALIST

## 2021-09-26 PROCEDURE — 74011250636 HC RX REV CODE- 250/636: Performed by: INTERNAL MEDICINE

## 2021-09-26 PROCEDURE — 36415 COLL VENOUS BLD VENIPUNCTURE: CPT

## 2021-09-26 PROCEDURE — 80048 BASIC METABOLIC PNL TOTAL CA: CPT

## 2021-09-26 PROCEDURE — 94640 AIRWAY INHALATION TREATMENT: CPT

## 2021-09-26 PROCEDURE — 74011636637 HC RX REV CODE- 636/637: Performed by: HOSPITALIST

## 2021-09-26 PROCEDURE — 74011250637 HC RX REV CODE- 250/637: Performed by: NURSE PRACTITIONER

## 2021-09-26 PROCEDURE — 74011250637 HC RX REV CODE- 250/637: Performed by: HOSPITALIST

## 2021-09-26 PROCEDURE — 74011250636 HC RX REV CODE- 250/636: Performed by: FAMILY MEDICINE

## 2021-09-26 PROCEDURE — 85025 COMPLETE CBC W/AUTO DIFF WBC: CPT

## 2021-09-26 RX ORDER — PREDNISONE 20 MG/1
40 TABLET ORAL DAILY
Qty: 10 TABLET | Refills: 0 | Status: SHIPPED | OUTPATIENT
Start: 2021-09-26 | End: 2021-10-01

## 2021-09-26 RX ORDER — LEVOFLOXACIN 750 MG/1
750 TABLET ORAL
Qty: 3 TABLET | Refills: 0 | Status: SHIPPED | OUTPATIENT
Start: 2021-09-26

## 2021-09-26 RX ORDER — FUROSEMIDE 20 MG/1
20 TABLET ORAL DAILY
Qty: 30 TABLET | Refills: 1 | Status: SHIPPED | OUTPATIENT
Start: 2021-09-26

## 2021-09-26 RX ORDER — CODEINE PHOSPHATE AND GUAIFENESIN 10; 100 MG/5ML; MG/5ML
10 SOLUTION ORAL
Qty: 60 ML | Refills: 1 | Status: SHIPPED | OUTPATIENT
Start: 2021-09-26 | End: 2021-10-01

## 2021-09-26 RX ADMIN — LEVOTHYROXINE SODIUM 88 MCG: 0.09 TABLET ORAL at 07:04

## 2021-09-26 RX ADMIN — PREDNISONE 40 MG: 20 TABLET ORAL at 07:04

## 2021-09-26 RX ADMIN — BUDESONIDE 500 MCG: 0.5 INHALANT RESPIRATORY (INHALATION) at 08:49

## 2021-09-26 RX ADMIN — ENOXAPARIN SODIUM 40 MG: 40 INJECTION SUBCUTANEOUS at 10:03

## 2021-09-26 RX ADMIN — PANTOPRAZOLE SODIUM 40 MG: 40 TABLET, DELAYED RELEASE ORAL at 07:04

## 2021-09-26 RX ADMIN — FUROSEMIDE 20 MG: 10 INJECTION, SOLUTION INTRAMUSCULAR; INTRAVENOUS at 10:03

## 2021-09-26 RX ADMIN — MONTELUKAST 10 MG: 10 TABLET, FILM COATED ORAL at 03:31

## 2021-09-26 RX ADMIN — Medication 10 ML: at 07:06

## 2021-09-26 RX ADMIN — IPRATROPIUM BROMIDE AND ALBUTEROL SULFATE 3 ML: .5; 3 SOLUTION RESPIRATORY (INHALATION) at 08:49

## 2021-09-26 NOTE — DISCHARGE SUMMARY
Hospitalist Discharge Summary     Patient ID:  Bill Palomino  [id number]  68 y.o.  1945  9/22/2021    PCP on record: Eamon Parks MD    Admit date: 9/22/2021  Discharge date and time: 9/26/2021    DISCHARGE DIAGNOSIS:    #Acute on Chronic Hypoxic Respiratory Failure  #Pulmonary Fibrosis  #Emphysema/COPD exacerbation  Hypothyroidism      CONSULTATIONS:  IP CONSULT TO PULMONOLOGY    Excerpted HPI from H&P of Courtney Faria MD:    Bethany Cabrera a 70 y. o. female with a pmhx chronic hypoxic respiratory failure 2/2 COPD, and pulmonary fibrosis, presented to the ED for cough, and worsening dyspnea for the past two weeks.  Her sx were associated with intermittent fever Tmax 100.3.  Her sx did not improved with amoxicillin, or OTC cough meds.  She has not been febrile since presenting to the ED.  ______________________________________________________________________  DISCHARGE SUMMARY/HOSPITAL COURSE:  for full details see H&P, daily progress notes, labs, consult notes. #Acute on Chronic Hypoxic Respiratory Failure  #Pulmonary Fibrosis  #Emphysema/COPD exacerbation  --Continue prednisone 40 mg daily for 5 days and Levaquin  --Wean off oxygen as tolerates, walk test for O2 requirements  Patient wears nocturnal oxygen chronically, but daytime oxygen  CTA thorax negative for PE, or other acute pathology. -c/w home nintedanib capsule for pulmonary fibrosis  -Echocardiogram done showing     · LV: Estimated LVEF is 60 - 65%. Normal cavity size and systolic function (ejection fraction normal). Upper normal wall thickness. Mild (grade 1) left ventricular diastolic dysfunction Left ventricular diastolic dysfunction due to impaired relaxation. · TV: Mild tricuspid valve regurgitation is present. Right Ventricular Arterial Pressure (RVSP) is 40 mmHg. Pulmonary hypertension found to be mild.     --Hypothyroidism continue levothyroxine               _______________________________________________________________________  Patient seen and examined by me on discharge day. Pertinent Findings:  Gen:    Not in distress  Chest: Clear lungs  CVS:   Regular rhythm. No edema  Abd:  Soft, not distended, not tender  Neuro:  Alert, oriented x3  _______________________________________________________________________  DISCHARGE MEDICATIONS:   Current Discharge Medication List      START taking these medications    Details   predniSONE (DELTASONE) 20 mg tablet Take 40 mg by mouth daily for 5 days. Qty: 10 Tablet, Refills: 0  Start date: 9/26/2021, End date: 10/1/2021      levoFLOXacin (Levaquin) 750 mg tablet Take 1 Tablet by mouth every fourty-eight (48) hours. Qty: 3 Tablet, Refills: 0  Start date: 9/26/2021      furosemide (Lasix) 20 mg tablet Take 1 Tablet by mouth daily. Qty: 30 Tablet, Refills: 1  Start date: 9/26/2021      guaiFENesin-codeine (ROBITUSSIN AC) 100-10 mg/5 mL solution Take 10 mL by mouth three (3) times daily as needed for Cough for up to 5 days. Max Daily Amount: 30 mL. Qty: 60 mL, Refills: 1  Start date: 9/26/2021, End date: 10/1/2021    Associated Diagnoses: Cough         CONTINUE these medications which have NOT CHANGED    Details   tiotropium bromide (Spiriva Respimat) 2.5 mcg/actuation inhaler Take 2 Puffs by inhalation daily. fexofenadine (ALLEGRA) 180 mg tablet Take 180 mg by mouth daily. cyanocobalamin (VITAMIN B-12) 1,000 mcg sublingual tablet Take 1,000 mcg by mouth daily. zinc 50 mg tab tablet Take 50 mg by mouth daily. GREEN TEA EXTRACT PO Take 1,000 mg by mouth daily. acetylcysteine (NAC) 600 mg cap capsule Take 600 mg by mouth daily. nintedanib (Ofev) 150 mg cap Take 150 mg by mouth two (2) times a day. cod liver oil cap Take 1 Cap by mouth daily. [place] cod liver oil      omeprazole (PRILOSEC) 40 mg capsule Take 40 mg by mouth daily. Takes one po in the afternoon. OTHER Cordyceps for respiratory health and immune system. Takes 1000 mg once daily. Ascorbic Acid-Multivits-Min (Emergen-C) 1,000 mg pwep Take 1,000 mg by mouth daily. MULTIVITAMIN PO Take  by mouth. Takes 2 packets with 3 pills each once a day. Has vitamin D3, turmeric, and omega 3. COLLAGEN Take  by mouth. Powder. Takes occasionally. Mixes with emergen C occasionally. famotidine (PEPCID) 20 mg tablet Take 20 mg by mouth every morning.      montelukast (SINGULAIR) 10 mg tablet Take 10 mg by mouth daily. levothyroxine (SYNTHROID) 88 mcg tablet Take 88 mcg by mouth Daily (before breakfast). Patient Follow Up Instructions:    Activity: Activity as tolerated  Diet: Regular Diet  Wound Care: None needed    Follow-up with PCP and Pulmonary in 1 week    Follow-up tests/labs : none  Follow-up Information     Follow up With Specialties Details Why Contact Inge Ivey MD Family Medicine In 1 week  [address]  [phone]          ________________________________________________________________    Risk of deterioration: High    Condition at Discharge:  Stable  __________________________________________________________________    Disposition  Home with family, no needs    ____________________________________________________________________    Code Status: Full Code  ___________________________________________________________________      Total time in minutes spent coordinating this discharge (includes going over instructions, follow-up, prescriptions, and preparing report for sign off to her PCP) :  35 minutes    Signed:  Ez Reyna MD

## 2021-09-26 NOTE — DISCHARGE INSTRUCTIONS
HOSPITALIST DISCHARGE INSTRUCTIONS    NAME: Racquel Murdock   :  1945   MRN:  [id number]     Date/Time:  2021 11:42 AM    ADMIT DATE: 2021     DISCHARGE DATE: 2021     DISCHARGE DIAGNOSIS:  Acute Bronchitis  Pulmonary Fibrosis    MEDICATIONS:  · It is important that you take the medication exactly as they are prescribed. · Keep your medication in the bottles provided by the pharmacist and keep a list of the medication names, dosages, and times to be taken in your wallet. · Do not take other medications without consulting your doctor. Pain Management: per above medications    What to do at Home    Recommended diet:  Regular Diet    Recommended activity: Activity as tolerated      If you experience any of the following symptoms then please call your primary care physician or return to the emergency room if you cannot get hold of your doctor:  Fever, chills, nausea, vomiting, diarrhea, change in mentation, falling, bleeding, shortness of breath    Follow Up:  Dr. Anca Cruz MD  you are to call and set up an appointment to see them in 7-10 days. Information obtained by :  I understand that if any problems occur once I am at home I am to contact my physician. I understand and acknowledge receipt of the instructions indicated above.                                                                                                                                            Physician's or R.N.'s Signature                                                                  Date/Time                                                                                                                                              Patient or Representative Signature                                                          Date/Time

## 2021-09-26 NOTE — PROGRESS NOTES
Problem: Falls - Risk of  Goal: *Absence of Falls  Description: Document Earma Lisa Fall Risk and appropriate interventions in the flowsheet.   Outcome: Progressing Towards Goal  Note: Fall Risk Interventions:            Medication Interventions: Teach patient to arise slowly    Elimination Interventions: Call light in reach    History of Falls Interventions: Vital signs minimum Q4HRs X 24 hrs (comment for end date)

## 2021-09-27 LAB
BACTERIA SPEC CULT: NORMAL
SERVICE CMNT-IMP: NORMAL

## 2022-03-18 PROBLEM — R06.02 SOB (SHORTNESS OF BREATH): Status: ACTIVE | Noted: 2021-09-22

## 2022-03-19 PROBLEM — Z86.010 HX OF COLONIC POLYP: Status: ACTIVE | Noted: 2018-12-03

## 2022-03-19 PROBLEM — K57.30 DIVERTICULA OF COLON: Status: ACTIVE | Noted: 2018-12-03

## 2022-03-20 PROBLEM — K63.5 COLON POLYP: Status: ACTIVE | Noted: 2018-12-03

## 2023-05-21 RX ORDER — OMEPRAZOLE 40 MG/1
40 CAPSULE, DELAYED RELEASE ORAL DAILY
COMMUNITY

## 2023-05-21 RX ORDER — MONTELUKAST SODIUM 10 MG/1
10 TABLET ORAL DAILY
COMMUNITY

## 2023-05-21 RX ORDER — NINTEDANIB 150 MG/1
150 CAPSULE ORAL 2 TIMES DAILY
COMMUNITY

## 2023-05-21 RX ORDER — FAMOTIDINE 20 MG/1
20 TABLET, FILM COATED ORAL
COMMUNITY

## 2023-05-21 RX ORDER — LEVOFLOXACIN 750 MG/1
750 TABLET ORAL
COMMUNITY
Start: 2021-09-26

## 2023-05-21 RX ORDER — FEXOFENADINE HCL 180 MG/1
180 TABLET ORAL DAILY
COMMUNITY

## 2023-05-21 RX ORDER — FUROSEMIDE 20 MG/1
20 TABLET ORAL DAILY
COMMUNITY
Start: 2021-09-26

## 2023-05-21 RX ORDER — LEVOTHYROXINE SODIUM 88 UG/1
88 TABLET ORAL
COMMUNITY

## 2024-01-01 ENCOUNTER — HOME CARE VISIT (OUTPATIENT)
Age: 79
End: 2024-01-01
Payer: MEDICARE

## 2024-01-01 ENCOUNTER — HOME CARE VISIT (OUTPATIENT)
Facility: HOME HEALTH | Age: 79
End: 2024-01-01
Payer: MEDICARE

## 2024-01-01 ENCOUNTER — HOSPICE ADMISSION (OUTPATIENT)
Age: 79
End: 2024-01-01
Payer: MEDICARE

## 2024-01-01 VITALS
HEART RATE: 146 BPM | SYSTOLIC BLOOD PRESSURE: 90 MMHG | TEMPERATURE: 98 F | DIASTOLIC BLOOD PRESSURE: 50 MMHG | RESPIRATION RATE: 40 BRPM

## 2024-01-01 PROCEDURE — 0651 HSPC ROUTINE HOME CARE

## 2024-01-01 PROCEDURE — G0156 HHCP-SVS OF AIDE,EA 15 MIN: HCPCS

## 2024-01-01 PROCEDURE — 2500000001 HSPC NON INJECTABLE MED

## 2024-01-01 PROCEDURE — G0299 HHS/HOSPICE OF RN EA 15 MIN: HCPCS

## 2024-01-01 PROCEDURE — 0655 HSPC INPATIENT RESPITE

## 2024-01-01 PROCEDURE — 0656 HSPC GENERAL INPATIENT

## 2024-01-01 ASSESSMENT — ENCOUNTER SYMPTOMS
COUGH CHARACTERISTICS: DRY
CONSTIPATION: 1
COUGH: 1
DYSPNEA ACTIVITY LEVEL: AT REST
SKIN LESIONS: 1

## 2024-01-29 ENCOUNTER — APPOINTMENT (OUTPATIENT)
Facility: HOSPITAL | Age: 79
DRG: 190 | End: 2024-01-29
Payer: MEDICARE

## 2024-01-29 ENCOUNTER — HOSPITAL ENCOUNTER (INPATIENT)
Facility: HOSPITAL | Age: 79
LOS: 9 days | Discharge: HOME HEALTH | DRG: 190 | End: 2024-02-07
Attending: EMERGENCY MEDICINE | Admitting: FAMILY MEDICINE
Payer: MEDICARE

## 2024-01-29 DIAGNOSIS — J44.1 COPD EXACERBATION (HCC): ICD-10-CM

## 2024-01-29 DIAGNOSIS — J96.21 ACUTE ON CHRONIC RESPIRATORY FAILURE WITH HYPOXIA (HCC): Primary | ICD-10-CM

## 2024-01-29 DIAGNOSIS — J84.10 PULMONARY FIBROSIS (HCC): ICD-10-CM

## 2024-01-29 PROBLEM — J44.9 COPD (CHRONIC OBSTRUCTIVE PULMONARY DISEASE) (HCC): Status: ACTIVE | Noted: 2024-01-29

## 2024-01-29 LAB
ALBUMIN SERPL-MCNC: 3.4 G/DL (ref 3.5–5)
ALBUMIN/GLOB SERPL: 0.9 (ref 1.1–2.2)
ALP SERPL-CCNC: 74 U/L (ref 45–117)
ALT SERPL-CCNC: 24 U/L (ref 12–78)
ANION GAP SERPL CALC-SCNC: 4 MMOL/L (ref 5–15)
AST SERPL-CCNC: 12 U/L (ref 15–37)
BASOPHILS # BLD: 0 K/UL (ref 0–0.1)
BASOPHILS NFR BLD: 0 % (ref 0–1)
BILIRUB SERPL-MCNC: 0.5 MG/DL (ref 0.2–1)
BUN SERPL-MCNC: 12 MG/DL (ref 6–20)
BUN/CREAT SERPL: 15 (ref 12–20)
CALCIUM SERPL-MCNC: 8.9 MG/DL (ref 8.5–10.1)
CHLORIDE SERPL-SCNC: 110 MMOL/L (ref 97–108)
CO2 SERPL-SCNC: 27 MMOL/L (ref 21–32)
COMMENT:: NORMAL
CREAT SERPL-MCNC: 0.78 MG/DL (ref 0.55–1.02)
DIFFERENTIAL METHOD BLD: ABNORMAL
EKG ATRIAL RATE: 70 BPM
EKG DIAGNOSIS: NORMAL
EKG P AXIS: 57 DEGREES
EKG P-R INTERVAL: 146 MS
EKG Q-T INTERVAL: 418 MS
EKG QRS DURATION: 74 MS
EKG QTC CALCULATION (BAZETT): 451 MS
EKG R AXIS: 77 DEGREES
EKG T AXIS: 30 DEGREES
EKG VENTRICULAR RATE: 70 BPM
EOSINOPHIL # BLD: 0 K/UL (ref 0–0.4)
EOSINOPHIL NFR BLD: 0 % (ref 0–7)
ERYTHROCYTE [DISTWIDTH] IN BLOOD BY AUTOMATED COUNT: 14 % (ref 11.5–14.5)
FLUAV AG NPH QL IA: NEGATIVE
FLUBV AG NOSE QL IA: NEGATIVE
GLOBULIN SER CALC-MCNC: 3.7 G/DL (ref 2–4)
GLUCOSE BLD STRIP.AUTO-MCNC: 175 MG/DL (ref 65–117)
GLUCOSE SERPL-MCNC: 95 MG/DL (ref 65–100)
HCT VFR BLD AUTO: 44.6 % (ref 35–47)
HGB BLD-MCNC: 14.7 G/DL (ref 11.5–16)
IMM GRANULOCYTES # BLD AUTO: 0.1 K/UL (ref 0–0.04)
IMM GRANULOCYTES NFR BLD AUTO: 1 % (ref 0–0.5)
LYMPHOCYTES # BLD: 2.2 K/UL (ref 0.8–3.5)
LYMPHOCYTES NFR BLD: 17 % (ref 12–49)
MCH RBC QN AUTO: 33.7 PG (ref 26–34)
MCHC RBC AUTO-ENTMCNC: 33 G/DL (ref 30–36.5)
MCV RBC AUTO: 102.3 FL (ref 80–99)
MONOCYTES # BLD: 1.1 K/UL (ref 0–1)
MONOCYTES NFR BLD: 9 % (ref 5–13)
NEUTS SEG # BLD: 9.3 K/UL (ref 1.8–8)
NEUTS SEG NFR BLD: 73 % (ref 32–75)
NRBC # BLD: 0 K/UL (ref 0–0.01)
NRBC BLD-RTO: 0 PER 100 WBC
NT PRO BNP: 334 PG/ML
PLATELET # BLD AUTO: 181 K/UL (ref 150–400)
PMV BLD AUTO: 12.6 FL (ref 8.9–12.9)
POTASSIUM SERPL-SCNC: 3.5 MMOL/L (ref 3.5–5.1)
PROT SERPL-MCNC: 7.1 G/DL (ref 6.4–8.2)
RBC # BLD AUTO: 4.36 M/UL (ref 3.8–5.2)
SARS-COV-2 RDRP RESP QL NAA+PROBE: NOT DETECTED
SERVICE CMNT-IMP: ABNORMAL
SODIUM SERPL-SCNC: 141 MMOL/L (ref 136–145)
SOURCE: NORMAL
SPECIMEN HOLD: NORMAL
TROPONIN I SERPL HS-MCNC: 6 NG/L (ref 0–51)
WBC # BLD AUTO: 12.6 K/UL (ref 3.6–11)

## 2024-01-29 PROCEDURE — 6360000002 HC RX W HCPCS: Performed by: NURSE PRACTITIONER

## 2024-01-29 PROCEDURE — 6360000004 HC RX CONTRAST MEDICATION: Performed by: RADIOLOGY

## 2024-01-29 PROCEDURE — 84484 ASSAY OF TROPONIN QUANT: CPT

## 2024-01-29 PROCEDURE — 82962 GLUCOSE BLOOD TEST: CPT

## 2024-01-29 PROCEDURE — 94640 AIRWAY INHALATION TREATMENT: CPT

## 2024-01-29 PROCEDURE — 2060000000 HC ICU INTERMEDIATE R&B

## 2024-01-29 PROCEDURE — 36415 COLL VENOUS BLD VENIPUNCTURE: CPT

## 2024-01-29 PROCEDURE — 6370000000 HC RX 637 (ALT 250 FOR IP): Performed by: NURSE PRACTITIONER

## 2024-01-29 PROCEDURE — 2580000003 HC RX 258: Performed by: NURSE PRACTITIONER

## 2024-01-29 PROCEDURE — 80053 COMPREHEN METABOLIC PANEL: CPT

## 2024-01-29 PROCEDURE — 87804 INFLUENZA ASSAY W/OPTIC: CPT

## 2024-01-29 PROCEDURE — 85025 COMPLETE CBC W/AUTO DIFF WBC: CPT

## 2024-01-29 PROCEDURE — 87635 SARS-COV-2 COVID-19 AMP PRB: CPT

## 2024-01-29 PROCEDURE — 96372 THER/PROPH/DIAG INJ SC/IM: CPT

## 2024-01-29 PROCEDURE — 93005 ELECTROCARDIOGRAM TRACING: CPT | Performed by: NURSE PRACTITIONER

## 2024-01-29 PROCEDURE — 99285 EMERGENCY DEPT VISIT HI MDM: CPT

## 2024-01-29 PROCEDURE — 96374 THER/PROPH/DIAG INJ IV PUSH: CPT

## 2024-01-29 PROCEDURE — 83880 ASSAY OF NATRIURETIC PEPTIDE: CPT

## 2024-01-29 PROCEDURE — 71275 CT ANGIOGRAPHY CHEST: CPT

## 2024-01-29 RX ORDER — SODIUM CHLORIDE 0.9 % (FLUSH) 0.9 %
5-40 SYRINGE (ML) INJECTION PRN
Status: DISCONTINUED | OUTPATIENT
Start: 2024-01-29 | End: 2024-02-07 | Stop reason: HOSPADM

## 2024-01-29 RX ORDER — ACETAMINOPHEN 650 MG/1
650 SUPPOSITORY RECTAL EVERY 6 HOURS PRN
Status: DISCONTINUED | OUTPATIENT
Start: 2024-01-29 | End: 2024-02-07 | Stop reason: HOSPADM

## 2024-01-29 RX ORDER — IPRATROPIUM BROMIDE AND ALBUTEROL SULFATE 2.5; .5 MG/3ML; MG/3ML
1 SOLUTION RESPIRATORY (INHALATION)
Status: DISCONTINUED | OUTPATIENT
Start: 2024-01-29 | End: 2024-01-31

## 2024-01-29 RX ORDER — POLYETHYLENE GLYCOL 3350 17 G/17G
17 POWDER, FOR SOLUTION ORAL DAILY PRN
Status: DISCONTINUED | OUTPATIENT
Start: 2024-01-29 | End: 2024-02-07 | Stop reason: HOSPADM

## 2024-01-29 RX ORDER — FUROSEMIDE 20 MG/1
20 TABLET ORAL DAILY
Status: DISCONTINUED | OUTPATIENT
Start: 2024-01-29 | End: 2024-01-29

## 2024-01-29 RX ORDER — SODIUM CHLORIDE 0.9 % (FLUSH) 0.9 %
5-40 SYRINGE (ML) INJECTION EVERY 12 HOURS SCHEDULED
Status: DISCONTINUED | OUTPATIENT
Start: 2024-01-29 | End: 2024-02-07 | Stop reason: HOSPADM

## 2024-01-29 RX ORDER — GABAPENTIN 300 MG/1
900 CAPSULE ORAL 3 TIMES DAILY
Status: DISCONTINUED | OUTPATIENT
Start: 2024-01-29 | End: 2024-01-29

## 2024-01-29 RX ORDER — PANTOPRAZOLE SODIUM 40 MG/1
40 TABLET, DELAYED RELEASE ORAL
Status: DISCONTINUED | OUTPATIENT
Start: 2024-01-30 | End: 2024-01-30

## 2024-01-29 RX ORDER — HYDROCODONE POLISTIREX AND CHLORPHENIRAMINE POLISTIREX 10; 8 MG/5ML; MG/5ML
5 SUSPENSION, EXTENDED RELEASE ORAL EVERY 12 HOURS PRN
Status: DISCONTINUED | OUTPATIENT
Start: 2024-01-29 | End: 2024-02-07 | Stop reason: HOSPADM

## 2024-01-29 RX ORDER — IPRATROPIUM BROMIDE AND ALBUTEROL SULFATE 2.5; .5 MG/3ML; MG/3ML
1 SOLUTION RESPIRATORY (INHALATION)
Status: COMPLETED | OUTPATIENT
Start: 2024-01-29 | End: 2024-01-29

## 2024-01-29 RX ORDER — LEVOTHYROXINE SODIUM 88 UG/1
88 TABLET ORAL
Status: DISCONTINUED | OUTPATIENT
Start: 2024-01-30 | End: 2024-02-07 | Stop reason: HOSPADM

## 2024-01-29 RX ORDER — GABAPENTIN 300 MG/1
300 CAPSULE ORAL 3 TIMES DAILY
Status: DISCONTINUED | OUTPATIENT
Start: 2024-01-30 | End: 2024-02-07 | Stop reason: HOSPADM

## 2024-01-29 RX ORDER — ACETYLCYSTEINE 200 MG/ML
600 SOLUTION ORAL; RESPIRATORY (INHALATION)
Status: DISCONTINUED | OUTPATIENT
Start: 2024-01-29 | End: 2024-02-07 | Stop reason: HOSPADM

## 2024-01-29 RX ORDER — SODIUM CHLORIDE 9 MG/ML
INJECTION, SOLUTION INTRAVENOUS PRN
Status: DISCONTINUED | OUTPATIENT
Start: 2024-01-29 | End: 2024-02-07 | Stop reason: HOSPADM

## 2024-01-29 RX ORDER — BENZONATATE 100 MG/1
100 CAPSULE ORAL 3 TIMES DAILY PRN
Status: DISCONTINUED | OUTPATIENT
Start: 2024-01-29 | End: 2024-01-31

## 2024-01-29 RX ORDER — FAMOTIDINE 20 MG/1
20 TABLET, FILM COATED ORAL DAILY
Status: DISCONTINUED | OUTPATIENT
Start: 2024-01-29 | End: 2024-01-29

## 2024-01-29 RX ORDER — CETIRIZINE HYDROCHLORIDE 10 MG/1
10 TABLET ORAL DAILY
Status: DISCONTINUED | OUTPATIENT
Start: 2024-01-29 | End: 2024-01-29

## 2024-01-29 RX ORDER — ENOXAPARIN SODIUM 100 MG/ML
40 INJECTION SUBCUTANEOUS DAILY
Status: DISCONTINUED | OUTPATIENT
Start: 2024-01-29 | End: 2024-02-07 | Stop reason: HOSPADM

## 2024-01-29 RX ORDER — ALBUTEROL SULFATE 2.5 MG/3ML
2.5 SOLUTION RESPIRATORY (INHALATION) ONCE
Status: COMPLETED | OUTPATIENT
Start: 2024-01-29 | End: 2024-01-29

## 2024-01-29 RX ORDER — MONTELUKAST SODIUM 10 MG/1
10 TABLET ORAL DAILY
Status: DISCONTINUED | OUTPATIENT
Start: 2024-01-29 | End: 2024-02-07 | Stop reason: HOSPADM

## 2024-01-29 RX ORDER — ONDANSETRON 2 MG/ML
4 INJECTION INTRAMUSCULAR; INTRAVENOUS EVERY 6 HOURS PRN
Status: DISCONTINUED | OUTPATIENT
Start: 2024-01-29 | End: 2024-02-07 | Stop reason: HOSPADM

## 2024-01-29 RX ORDER — GUAIFENESIN 600 MG/1
600 TABLET, EXTENDED RELEASE ORAL 2 TIMES DAILY
Status: DISCONTINUED | OUTPATIENT
Start: 2024-01-29 | End: 2024-01-29

## 2024-01-29 RX ORDER — ONDANSETRON 4 MG/1
4 TABLET, ORALLY DISINTEGRATING ORAL EVERY 8 HOURS PRN
Status: DISCONTINUED | OUTPATIENT
Start: 2024-01-29 | End: 2024-02-07 | Stop reason: HOSPADM

## 2024-01-29 RX ORDER — ACETAMINOPHEN 325 MG/1
650 TABLET ORAL EVERY 6 HOURS PRN
Status: DISCONTINUED | OUTPATIENT
Start: 2024-01-29 | End: 2024-02-07 | Stop reason: HOSPADM

## 2024-01-29 RX ADMIN — ACETYLCYSTEINE 600 MG: 200 INHALANT RESPIRATORY (INHALATION) at 21:28

## 2024-01-29 RX ADMIN — ARFORMOTEROL TARTRATE: 15 SOLUTION RESPIRATORY (INHALATION) at 21:28

## 2024-01-29 RX ADMIN — IOPAMIDOL 100 ML: 755 INJECTION, SOLUTION INTRAVENOUS at 13:15

## 2024-01-29 RX ADMIN — ENOXAPARIN SODIUM 40 MG: 100 INJECTION SUBCUTANEOUS at 19:07

## 2024-01-29 RX ADMIN — IPRATROPIUM BROMIDE AND ALBUTEROL SULFATE 1 DOSE: 2.5; .5 SOLUTION RESPIRATORY (INHALATION) at 16:29

## 2024-01-29 RX ADMIN — ALBUTEROL SULFATE 2.5 MG: 2.5 SOLUTION RESPIRATORY (INHALATION) at 12:29

## 2024-01-29 RX ADMIN — GABAPENTIN 300 MG: 300 CAPSULE ORAL at 21:58

## 2024-01-29 RX ADMIN — IPRATROPIUM BROMIDE AND ALBUTEROL SULFATE 1 DOSE: 2.5; .5 SOLUTION RESPIRATORY (INHALATION) at 12:29

## 2024-01-29 RX ADMIN — WATER 40 MG: 1 INJECTION INTRAMUSCULAR; INTRAVENOUS; SUBCUTANEOUS at 19:15

## 2024-01-29 RX ADMIN — IPRATROPIUM BROMIDE AND ALBUTEROL SULFATE 1 DOSE: 2.5; .5 SOLUTION RESPIRATORY (INHALATION) at 21:28

## 2024-01-29 RX ADMIN — MONTELUKAST SODIUM 10 MG: 10 TABLET ORAL at 19:08

## 2024-01-29 ASSESSMENT — LIFESTYLE VARIABLES
HOW MANY STANDARD DRINKS CONTAINING ALCOHOL DO YOU HAVE ON A TYPICAL DAY: PATIENT DOES NOT DRINK
HOW OFTEN DO YOU HAVE A DRINK CONTAINING ALCOHOL: NEVER

## 2024-01-29 ASSESSMENT — PAIN - FUNCTIONAL ASSESSMENT: PAIN_FUNCTIONAL_ASSESSMENT: NONE - DENIES PAIN

## 2024-01-29 NOTE — H&P
History and Physical    Date of Service:  1/29/2024  Primary Care Provider: Babak Roque MD  Source of information: patient    Chief Complaint: Shortness of Breath      History of Presenting Illness:   Milly Morse is a 78 y.o. female with pmhx of pulmonary fibrosis, emphysema on 2lnc oxygen at home, GERD and hypothyroidism who presents with shortness of breath, chronic cough and hypoxia.  Patient was seen at pulmonologist office and oxygen saturation was noted to be 80%.  Patient instructed to come to the ED.  Patient denies fever, n/v/d but does attests to feeling poorly on and off since thanksgiving.  Patient has been coughing non stop and has been treated with steroids and oral abx and was continuing to feel more malaise, shortness of breath and persistent cough.  Workup in ED showed wbc 12.6, Hg 14.7, plt 181, , K 3.5, BUN 12, Cr 0.78, glucose 121, , Trop 6.  Covid/flu negative.  CTA of chest was negative for PE and PNA but positive for extensive emphysema and hiatal hernia.  Patient admitted to hospital for further workup.         REVIEW OF SYSTEMS:  A comprehensive review of systems was negative except for that written in the History of Present Illness.     Past Medical History:   Diagnosis Date    Adverse effect of anesthesia     vocal cord swelling with bronchoscopy - was given lidocaine for bronchoscopy - unsure if lidocaine this caused the swelling/\"put me out the second time\" and did not use lidocaine and there no problem    COPD (chronic obstructive pulmonary disease) (HCC)     does not use oxygen, \"mild COPD\" per pt, CXR states otherwise    Ill-defined condition     uses 2 liters of oxygen    Ill-defined condition     pulmonary fibrosis    Menopause     Pulmonary fibrosis (HCC)     Thyroid disease     hypothryoid      Past Surgical History:   Procedure Laterality Date    CHOLECYSTECTOMY      COLONOSCOPY  9.2015    polyps x2    COLONOSCOPY  12/2018    polyp    COLONOSCOPY  authorized by the FDA under an Emergency Use Authorization (EUA) for use by authorized laboratories.   Fact sheet for Healthcare Providers:  https://www.fda.gov/media/955508/download  Fact sheet for Patients: https://www.fda.gov/media/056965/download     Methodology: Isothermal Nucleic Acid Amplification         Rapid influenza A/B antigens [[id number]] Collected: 01/29/24 1127    Order Status: Completed Specimen: Nasopharyngeal Updated: 01/29/24 1205     Influenza A Ag Negative        Influenza B Ag Negative                IMAGING:   CTA CHEST W WO CONTRAST   Final Result      1. Cardiorespiratory motion with no acute large or central pulmonary embolus.   2. Extensive emphysema.   3. Hiatal hernia.                 ECG/ECHO:    Encounter Date: 01/29/24   EKG 12 Lead   Result Value    Ventricular Rate 70    Atrial Rate 70    P-R Interval 146    QRS Duration 74    Q-T Interval 418    QTc Calculation (Bazett) 451    P Axis 57    R Axis 77    T Axis 30    Diagnosis      Sinus rhythm with premature atrial complexes with aberrant conduction  Otherwise normal ECG  When compared with ECG of 22-SEP-2021 13:51,  aberrant conduction is now present       No results found for this or any previous visit.        Notes reviewed from all clinical/nonclinical/nursing services involved in patient's clinical care. Care coordination discussions were held with appropriate clinical/nonclinical/ nursing providers based on care coordination needs.     Assessment:   Given the patient's current clinical presentation, there is a high level of concern for decompensation if discharged from the emergency department. Complex decision making was performed, which includes reviewing the patient's available past medical records, laboratory results, and imaging studies.    Principal Problem:    COPD exacerbation (HCC)  Resolved Problems:    * No resolved hospital problems. *      Plan:     Acute on Chronic Respiratory failure with

## 2024-01-29 NOTE — ED TRIAGE NOTES
Patient was sent by her pulmonoglist for low o2 levels, patient is normally on 2 l NC. Bumped up to 4 L in triage

## 2024-01-29 NOTE — ED PROVIDER NOTES
Take 150 mg by mouth 2 times daily    OMEPRAZOLE (PRILOSEC) 40 MG DELAYED RELEASE CAPSULE    Take 1 capsule by mouth daily    TIOTROPIUM (SPIRIVA RESPIMAT) 2.5 MCG/ACT AERS INHALER    Inhale 2 puffs into the lungs daily       ALLERGIES     Patient has no known allergies.    FAMILY HISTORY       Family History   Problem Relation Age of Onset    Breast Cancer Paternal Aunt     Pulmonary Embolism Maternal Aunt     Breast Cancer Maternal Aunt     Diabetes Mother     Other Brother         colon disease - part of colon removed/was not cancer/had a \"bag\"          SOCIAL HISTORY       Social History     Socioeconomic History    Marital status: Single   Tobacco Use    Smoking status: Former    Smokeless tobacco: Never    Tobacco comments:     Quit smoking: 10-15 yrs ago   Substance and Sexual Activity    Alcohol use: Yes    Drug use: No       SCREENINGS         [place] Coma Scale  Eye Opening: Spontaneous  Best Verbal Response: Oriented  Best Motor Response: Obeys commands  [place] Coma Scale Score: 15                     CIWA Assessment  BP: (!) 158/85  Pulse: 63                 PHYSICAL EXAM    (up to 7 for level 4, 8 or more for level 5)     ED Triage Vitals [01/29/24 1050]   BP Temp Temp Source Pulse Respirations SpO2 Height Weight - Scale   (!) 158/85 98 °F (36.7 °C) Temporal 65 16 (!) 88 % -- 74.8 kg (164 lb 14.5 oz)       Physical Exam  Vitals and nursing note reviewed.   Constitutional:       General: She is not in acute distress.     Appearance: Normal appearance. She is not ill-appearing, toxic-appearing or diaphoretic.   HENT:      Head: Normocephalic and atraumatic.      Nose: Nose normal.      Mouth/Throat:      Mouth: Mucous membranes are moist.   Eyes:      Extraocular Movements: Extraocular movements intact.      Conjunctiva/sclera: Conjunctivae normal.      Pupils: Pupils are equal, round, and reactive to light.   Cardiovascular:      Comments: Warm and well perfused  Pulmonary:      Effort: Pulmonary effort  patient will be accommodated in the Main ED as soon as possible. I have also requested to contact the triage nurse or myself immediately if the patient experiences any changes in their condition during this brief waiting period.  SWATHI Pollock NP   [LM]   1141 EKG interpretation: (Preliminary)  Rhythm: normal sinus rhythm; and irregular. Rate (approx.): 70; Axis: normal; P wave: normal; QRS interval: normal ; ST/T wave: normal;PAC's; Other findings: abnormal ekg   [FD]      ED Course User Index  [FD] Asael Jensen MD  [LM] Jose Sheffield, SWATHI Doty NP     Perfect Serve Consult for Admission  2:59 PM    ED Room Number: ER23/23  Patient Name and age:  Milly Morse 78 y.o.  female  Working Diagnosis:   1. Acute on chronic respiratory failure with hypoxia (HCC)    2. Pulmonary fibrosis (HCC)        COVID-19 Suspicion: No  Sepsis present:  No  Reassessment needed: No  Code Status:  Full Code  Readmission: No  Isolation Requirements: no  Recommended Level of Care: telemetry  Department: [facility] Adult ED - [phone]  Consulting Provider: d/w Dr. Brown        CRITICAL CARE TIME   Total Critical Care time was 91 minutes, excluding separately reportable procedures.  There was a high probability of clinically significant/life threatening deterioration in the patient's condition which required my urgent intervention.      CONSULTS:  None    PROCEDURES:  Unless otherwise noted below, none     Procedures        FINAL IMPRESSION    No diagnosis found.      DISPOSITION/PLAN   DISPOSITION        PATIENT REFERRED TO:  No follow-up provider specified.    DISCHARGE MEDICATIONS:  New Prescriptions    No medications on file     Controlled Substances Monitoring:          No data to display                (Please note that portions of this note were completed with a voice recognition program.  Efforts were made to edit the dictations but occasionally words are mis-transcribed.)    Asael Jensen MD (electronically

## 2024-01-30 LAB
ANION GAP SERPL CALC-SCNC: 4 MMOL/L (ref 5–15)
BASOPHILS # BLD: 0 K/UL (ref 0–0.1)
BASOPHILS NFR BLD: 0 % (ref 0–1)
BUN SERPL-MCNC: 13 MG/DL (ref 6–20)
BUN/CREAT SERPL: 13 (ref 12–20)
CALCIUM SERPL-MCNC: 8.3 MG/DL (ref 8.5–10.1)
CHLORIDE SERPL-SCNC: 113 MMOL/L (ref 97–108)
CO2 SERPL-SCNC: 25 MMOL/L (ref 21–32)
CREAT SERPL-MCNC: 1.02 MG/DL (ref 0.55–1.02)
CRP SERPL-MCNC: 0.44 MG/DL (ref 0–0.3)
DIFFERENTIAL METHOD BLD: ABNORMAL
EOSINOPHIL # BLD: 0 K/UL (ref 0–0.4)
EOSINOPHIL NFR BLD: 0 % (ref 0–7)
ERYTHROCYTE [DISTWIDTH] IN BLOOD BY AUTOMATED COUNT: 14.3 % (ref 11.5–14.5)
ERYTHROCYTE [SEDIMENTATION RATE] IN BLOOD: 8 MM/HR (ref 0–30)
GLUCOSE SERPL-MCNC: 168 MG/DL (ref 65–100)
HCT VFR BLD AUTO: 39.8 % (ref 35–47)
HGB BLD-MCNC: 13.4 G/DL (ref 11.5–16)
IMM GRANULOCYTES # BLD AUTO: 0 K/UL (ref 0–0.04)
IMM GRANULOCYTES NFR BLD AUTO: 0 % (ref 0–0.5)
LYMPHOCYTES # BLD: 0.4 K/UL (ref 0.8–3.5)
LYMPHOCYTES NFR BLD: 5 % (ref 12–49)
MCH RBC QN AUTO: 34 PG (ref 26–34)
MCHC RBC AUTO-ENTMCNC: 33.7 G/DL (ref 30–36.5)
MCV RBC AUTO: 101 FL (ref 80–99)
MONOCYTES # BLD: 0.2 K/UL (ref 0–1)
MONOCYTES NFR BLD: 2 % (ref 5–13)
NEUTS SEG # BLD: 7.4 K/UL (ref 1.8–8)
NEUTS SEG NFR BLD: 93 % (ref 32–75)
NRBC # BLD: 0 K/UL (ref 0–0.01)
NRBC BLD-RTO: 0 PER 100 WBC
NT PRO BNP: 304 PG/ML
PLATELET # BLD AUTO: 148 K/UL (ref 150–400)
PMV BLD AUTO: 12.8 FL (ref 8.9–12.9)
POTASSIUM SERPL-SCNC: 4.4 MMOL/L (ref 3.5–5.1)
PROCALCITONIN SERPL-MCNC: <0.05 NG/ML
PROCALCITONIN SERPL-MCNC: <0.05 NG/ML
RBC # BLD AUTO: 3.94 M/UL (ref 3.8–5.2)
RBC MORPH BLD: ABNORMAL
SODIUM SERPL-SCNC: 142 MMOL/L (ref 136–145)
TSH SERPL DL<=0.05 MIU/L-ACNC: 0.13 UIU/ML (ref 0.36–3.74)
WBC # BLD AUTO: 8 K/UL (ref 3.6–11)

## 2024-01-30 PROCEDURE — 84443 ASSAY THYROID STIM HORMONE: CPT

## 2024-01-30 PROCEDURE — 85652 RBC SED RATE AUTOMATED: CPT

## 2024-01-30 PROCEDURE — 6370000000 HC RX 637 (ALT 250 FOR IP): Performed by: NURSE PRACTITIONER

## 2024-01-30 PROCEDURE — 6360000002 HC RX W HCPCS: Performed by: NURSE PRACTITIONER

## 2024-01-30 PROCEDURE — 87070 CULTURE OTHR SPECIMN AEROBIC: CPT

## 2024-01-30 PROCEDURE — 2580000003 HC RX 258: Performed by: NURSE PRACTITIONER

## 2024-01-30 PROCEDURE — 94640 AIRWAY INHALATION TREATMENT: CPT

## 2024-01-30 PROCEDURE — 6360000002 HC RX W HCPCS: Performed by: PHYSICIAN ASSISTANT

## 2024-01-30 PROCEDURE — 6370000000 HC RX 637 (ALT 250 FOR IP)

## 2024-01-30 PROCEDURE — 2580000003 HC RX 258: Performed by: INTERNAL MEDICINE

## 2024-01-30 PROCEDURE — 6370000000 HC RX 637 (ALT 250 FOR IP): Performed by: INTERNAL MEDICINE

## 2024-01-30 PROCEDURE — C9113 INJ PANTOPRAZOLE SODIUM, VIA: HCPCS | Performed by: INTERNAL MEDICINE

## 2024-01-30 PROCEDURE — 84145 PROCALCITONIN (PCT): CPT

## 2024-01-30 PROCEDURE — 83880 ASSAY OF NATRIURETIC PEPTIDE: CPT

## 2024-01-30 PROCEDURE — 6360000002 HC RX W HCPCS: Performed by: INTERNAL MEDICINE

## 2024-01-30 PROCEDURE — 2580000003 HC RX 258: Performed by: PHYSICIAN ASSISTANT

## 2024-01-30 PROCEDURE — 87205 SMEAR GRAM STAIN: CPT

## 2024-01-30 PROCEDURE — 2060000000 HC ICU INTERMEDIATE R&B

## 2024-01-30 PROCEDURE — 99223 1ST HOSP IP/OBS HIGH 75: CPT | Performed by: INTERNAL MEDICINE

## 2024-01-30 PROCEDURE — 36415 COLL VENOUS BLD VENIPUNCTURE: CPT

## 2024-01-30 PROCEDURE — A4216 STERILE WATER/SALINE, 10 ML: HCPCS | Performed by: INTERNAL MEDICINE

## 2024-01-30 PROCEDURE — 80048 BASIC METABOLIC PNL TOTAL CA: CPT

## 2024-01-30 PROCEDURE — 86140 C-REACTIVE PROTEIN: CPT

## 2024-01-30 PROCEDURE — 85025 COMPLETE CBC W/AUTO DIFF WBC: CPT

## 2024-01-30 RX ORDER — FLUTICASONE FUROATE 200 UG/1
1 POWDER RESPIRATORY (INHALATION) DAILY
COMMUNITY

## 2024-01-30 RX ORDER — ALPRAZOLAM 0.25 MG/1
0.25 TABLET ORAL EVERY 6 HOURS PRN
Status: DISCONTINUED | OUTPATIENT
Start: 2024-01-30 | End: 2024-02-07 | Stop reason: HOSPADM

## 2024-01-30 RX ORDER — GABAPENTIN 300 MG/1
300 CAPSULE ORAL 3 TIMES DAILY
COMMUNITY

## 2024-01-30 RX ORDER — ESOMEPRAZOLE MAGNESIUM 40 MG/1
40 GRANULE, DELAYED RELEASE ORAL DAILY
COMMUNITY

## 2024-01-30 RX ORDER — FLUTICASONE FUROATE 100 UG/1
POWDER RESPIRATORY (INHALATION)
COMMUNITY
End: 2024-01-30 | Stop reason: ALTCHOICE

## 2024-01-30 RX ADMIN — ARFORMOTEROL TARTRATE: 15 SOLUTION RESPIRATORY (INHALATION) at 20:27

## 2024-01-30 RX ADMIN — PANTOPRAZOLE SODIUM 40 MG: 40 TABLET, DELAYED RELEASE ORAL at 07:22

## 2024-01-30 RX ADMIN — LEVOTHYROXINE SODIUM 88 MCG: 0.09 TABLET ORAL at 07:22

## 2024-01-30 RX ADMIN — ALPRAZOLAM 0.25 MG: 0.25 TABLET ORAL at 21:40

## 2024-01-30 RX ADMIN — IPRATROPIUM BROMIDE AND ALBUTEROL SULFATE 1 DOSE: 2.5; .5 SOLUTION RESPIRATORY (INHALATION) at 08:48

## 2024-01-30 RX ADMIN — IPRATROPIUM BROMIDE AND ALBUTEROL SULFATE 1 DOSE: 2.5; .5 SOLUTION RESPIRATORY (INHALATION) at 15:57

## 2024-01-30 RX ADMIN — SODIUM CHLORIDE, PRESERVATIVE FREE 10 ML: 5 INJECTION INTRAVENOUS at 08:59

## 2024-01-30 RX ADMIN — MONTELUKAST SODIUM 10 MG: 10 TABLET ORAL at 08:54

## 2024-01-30 RX ADMIN — GABAPENTIN 300 MG: 100 CAPSULE ORAL at 12:59

## 2024-01-30 RX ADMIN — IPRATROPIUM BROMIDE AND ALBUTEROL SULFATE 1 DOSE: 2.5; .5 SOLUTION RESPIRATORY (INHALATION) at 11:52

## 2024-01-30 RX ADMIN — WATER 60 MG: 1 INJECTION INTRAMUSCULAR; INTRAVENOUS; SUBCUTANEOUS at 14:03

## 2024-01-30 RX ADMIN — SODIUM CHLORIDE, PRESERVATIVE FREE 40 MG: 5 INJECTION INTRAVENOUS at 19:36

## 2024-01-30 RX ADMIN — IPRATROPIUM BROMIDE AND ALBUTEROL SULFATE 1 DOSE: 2.5; .5 SOLUTION RESPIRATORY (INHALATION) at 20:27

## 2024-01-30 RX ADMIN — GABAPENTIN 300 MG: 100 CAPSULE ORAL at 08:54

## 2024-01-30 RX ADMIN — GABAPENTIN 300 MG: 100 CAPSULE ORAL at 21:40

## 2024-01-30 RX ADMIN — ARFORMOTEROL TARTRATE: 15 SOLUTION RESPIRATORY (INHALATION) at 08:48

## 2024-01-30 RX ADMIN — ACETYLCYSTEINE 600 MG: 200 INHALANT RESPIRATORY (INHALATION) at 20:27

## 2024-01-30 RX ADMIN — ACETYLCYSTEINE 600 MG: 200 INHALANT RESPIRATORY (INHALATION) at 08:48

## 2024-01-30 RX ADMIN — SODIUM CHLORIDE, PRESERVATIVE FREE 10 ML: 5 INJECTION INTRAVENOUS at 21:46

## 2024-01-30 RX ADMIN — ENOXAPARIN SODIUM 40 MG: 100 INJECTION SUBCUTANEOUS at 08:55

## 2024-01-30 RX ADMIN — WATER 60 MG: 1 INJECTION INTRAMUSCULAR; INTRAVENOUS; SUBCUTANEOUS at 21:40

## 2024-01-30 RX ADMIN — WATER 40 MG: 1 INJECTION INTRAMUSCULAR; INTRAVENOUS; SUBCUTANEOUS at 08:54

## 2024-01-30 NOTE — PROGRESS NOTES
[facility]                                                                                          Hospitalist Progress Note  Marlene Melton MD  Answering service: [phone] OR [phone] from in house phone        Date of Service:  2024  NAME:  Milly Morse  :  1945  MRN:  [id number]       Admission Summary:   Milly Morse is a 78 y.o. female with pmhx of pulmonary fibrosis, emphysema on 2lnc oxygen at home, GERD and hypothyroidism who presents with shortness of breath, chronic cough and hypoxia.  Patient was seen at pulmonologist office and oxygen saturation was noted to be 80%.  Patient instructed to come to the ED.  Patient denies fever, n/v/d but does attests to feeling poorly on and off since thanksgi.  Patient has been coughing non stop and has been treated with steroids and oral abx and was continuing to feel more malaise, shortness of breath and persistent cough.  Workup in ED showed wbc 12.6, Hg 14.7, plt 181, , K 3.5, BUN 12, Cr 0.78, glucose 121, , Trop 6.  Covid/flu negative.  CTA of chest was negative for PE and PNA but positive for extensive emphysema and hiatal hernia.  Patient admitted to hospital for further workup.      Interval history / Subjective:   Patient seen and examined in the ED.   She feels slightly better but still coughing a lot.   On O2 via NC.  Concerned about episodes of bradycardia, HR as low as 30's.      Assessment & Plan:        cute on Chronic Respiratory failure with hypoxia  Emphysema  Pulmonary Fibrosis  -on home oxygen 2 LNC, wean oxygen to keep sat 90%  -duonebs q4  -methylprednisone 40 mg iv daily  -mucomyst neb bid  -formeterol/budesonide neb bid  -tessalon pearls, zyrtec  -lasix  -covid/flu negative  -sputum gram stain pending, sputum culture  -CTA negative for pna, PE, +hiatal hernia, +emphysema  -Pulmonary consult and recommendations appreciated     GERD  -continue home protonix,  PO) Take by mouth    acetylcysteine 600 MG CAPS Take 600 mg by mouth daily    Cyanocobalamin 1000 MCG SUBL Take 1,000 mcg by mouth daily    levothyroxine (SYNTHROID) 88 MCG tablet Take 1 tablet by mouth Daily Takes daily except Sundays    montelukast (SINGULAIR) 10 MG tablet Take 1 tablet by mouth daily    tiotropium (SPIRIVA RESPIMAT) 2.5 MCG/ACT AERS inhaler Inhale 2 puffs into the lungs daily     ______________________________________________________________________  EXPECTED LENGTH OF STAY: Unable to retrieve estimated LOS  ACTUAL LENGTH OF STAY:          1                 Marlene Melton MD

## 2024-01-30 NOTE — CONSULTS
12.6* 8.0   HGB 14.7 13.4   HCT 44.6 39.8    148*   .3* 101.0*   MCH 33.7 34.0        Metabolic  Panel Recent Labs     01/29/24  1127 01/30/24  0243    142   K 3.5 4.4   * 113*   CO2 27 25   BUN 12 13   ALT 24  --         Pertinent Labs                Oumar Arciniega PA-C  1/30/2024

## 2024-01-30 NOTE — CONSULTS
Unremarkable.  Coronary artery calcification:  1 absent.  AORTA:  Atherosclerosis. No aneurysm or dissection.  PULMONARY ARTERIES:Cardiorespiratory motion at the lung bases with no acute  large or central pulmonary embolus.  Proximal level of embolus N/A  LUNGS/PLEURA: Extensive emphysema without focal consolidation, pulmonary edema,  or pleural effusion.  INCIDENTALLY IMAGED UPPER ABDOMEN: No significant abnormality.  BONES: No destructive bone lesion.  ADDITIONAL COMMENTS:  N/A    Impression  1. Cardiorespiratory motion with no acute large or central pulmonary embolus.  2. Extensive emphysema.  3. Hiatal hernia.    Xray Result (most recent):  XR WRIST RIGHT (MIN 3 VIEWS) 02/15/2022    Narrative  Sitting. CARMITA Castillo, Lat.    Impression  X-rays ordered and obtained in the office today demonstrate mild TMC joint  space narrowing.        Recent Labs     01/29/24  1127 01/30/24  0243    142   K 3.5 4.4   * 113*   CO2 27 25   BUN 12 13   ALT 24  --      Recent Labs     01/29/24  1127 01/30/24  0243   WBC 12.6* 8.0   HGB 14.7 13.4   HCT 44.6 39.8    148*     Creatinine (MG/DL)   Date Value   01/30/2024 1.02   01/29/2024 0.78   09/26/2021 0.80   09/25/2021 1.00   09/24/2021 0.91       Current meds:    Current Facility-Administered Medications:     methylPREDNISolone sodium succ (SOLU-MEDROL) 60 mg in sterile water 1.5 mL injection, 60 mg, IntraVENous, Q6H, Oumar Arciniega PA-C, 60 mg at 01/30/24 1403    pantoprazole (PROTONIX) 40 mg in sodium chloride (PF) 0.9 % 10 mL injection, 40 mg, IntraVENous, Q12H, Marlene Melton MD    levothyroxine (SYNTHROID) tablet 88 mcg, 88 mcg, Oral, QA AC, Karthikeyan Miller APRN - CNP, 88 mcg at 01/30/24 0722    montelukast (SINGULAIR) tablet 10 mg, 10 mg, Oral, Daily, Karthikeyan Miller APRN - CNP, 10 mg at 01/30/24 0854    sodium chloride flush 0.9 % injection 5-40 mL, 5-40 mL, IntraVENous, 2 times per day, Karthikeyan Miller APRN - CNP, 10 mL at 01/30/24  0859    sodium chloride flush 0.9 % injection 5-40 mL, 5-40 mL, IntraVENous, PRN, Karthikeyan Miller APRN - CNP    0.9 % sodium chloride infusion, , IntraVENous, PRN, Karthikeyan Miller APRN - CNP    ondansetron (ZOFRAN-ODT) disintegrating tablet 4 mg, 4 mg, Oral, Q8H PRN **OR** ondansetron (ZOFRAN) injection 4 mg, 4 mg, IntraVENous, Q6H PRN, Karthikeyan Miller APRN - CNP    polyethylene glycol (GLYCOLAX) packet 17 g, 17 g, Oral, Daily PRN, Karthikeyan Miller APRN - CNP    enoxaparin (LOVENOX) injection 40 mg, 40 mg, SubCUTAneous, Daily, Karthikeyan Miller APRN - CNP, 40 mg at 01/30/24 0855    acetaminophen (TYLENOL) tablet 650 mg, 650 mg, Oral, Q6H PRN **OR** acetaminophen (TYLENOL) suppository 650 mg, 650 mg, Rectal, Q6H PRN, Karthikeyan Miller APRN - CNP    ipratropium 0.5 mg-albuterol 2.5 mg (DUONEB) nebulizer solution 1 Dose, 1 Dose, Inhalation, Q4H WA RT, Karthikeyan Miller APRN - CNP, 1 Dose at 01/30/24 1557    acetylcysteine (MUCOMYST) 20 % solution 600 mg, 600 mg, Inhalation, BID RT, Karthikeyan Miller APRN - CNP, 600 mg at 01/30/24 0848    benzonatate (TESSALON) capsule 100 mg, 100 mg, Oral, TID PRN, Karthikeyan Miller APRN - CNP    arformoterol 15 mcg-budesonide 0.25 mg neb solution, , Nebulization, BID RT, Karthikeyan Miller APRN - CNP, Given at 01/30/24 0848    HYDROcodone-chlorpheniramine (TUSSIONEX) 10-8 MG/5ML oral suspension 5 mL, 5 mL, Oral, Q12H PRN, Karthikeyan Miller APRN - CNP    gabapentin (NEURONTIN) capsule 300 mg, 300 mg, Oral, TID, Jaylin Menon APRN - NP, 300 mg at 01/30/24 7227    Current Outpatient Medications:     esomeprazole Magnesium (NEXIUM) 40 MG PACK, Take 1 packet by mouth daily, Disp: , Rfl:     gabapentin (NEURONTIN) 300 MG capsule, Take 1 capsule by mouth 3 times daily. Max Daily Amount: 900 mg, Disp: , Rfl:     fluticasone (ARNUITY ELLIPTA) 200 MCG/ACT AEPB, Inhale 1 puff into the lungs daily, Disp: , Rfl:     Chromium 200 MCG CAPS, Take

## 2024-01-30 NOTE — PROGRESS NOTES
Admission Medication Reconciliation:    Information obtained from:  Patient  RxQuery data available¹:  Yes    Comments/Recommendations: Updated PTA meds/reviewed patient's allergies.    1)  Patient was good historian. She was able to provide medication names and strengths. Patient does take several supplements. She is no longer taking Ofev as the prescription has run out and she is trying to get financial coverage for it. Also, she takes Levothyroxine every day but Sundays. Patient can provide Arnuity Ellipta inhaler for inpatient use.    2)  Medication changes (since last review):  Added  - Nexium, Chromium, Arnuity Ellipta, Gabapentin,     Adjusted  - Levothyroxine to daily except Sundays    Removed  - Pepcid, Allegra, Furosemide, Green Tea, Levofloxacin, Ofev, Prilosec,     3)  Will contact provider with recommendations concerning Levothyroxine and inhaler.     ¹RxQuery pharmacy benefit data reflects medications filled and processed through the patient's insurance, however   this data does NOT capture whether the medication was picked up or is currently being taken by the patient.    Allergies:  Patient has no known allergies.    Significant PMH/Disease States:   Past Medical History:   Diagnosis Date    Adverse effect of anesthesia     vocal cord swelling with bronchoscopy - was given lidocaine for bronchoscopy - unsure if lidocaine this caused the swelling/\"put me out the second time\" and did not use lidocaine and there no problem    COPD (chronic obstructive pulmonary disease) (HCC)     does not use oxygen, \"mild COPD\" per pt, CXR states otherwise    Ill-defined condition     uses 2 liters of oxygen    Ill-defined condition     pulmonary fibrosis    Menopause     Pulmonary fibrosis (HCC)     Thyroid disease     hypothryoid     Chief Complaint for this Admission:    Chief Complaint   Patient presents with    Shortness of Breath     Prior to Admission Medications:   Prior to Admission Medications   Prescriptions

## 2024-01-31 ENCOUNTER — APPOINTMENT (OUTPATIENT)
Facility: HOSPITAL | Age: 79
DRG: 190 | End: 2024-01-31
Payer: MEDICARE

## 2024-01-31 ENCOUNTER — APPOINTMENT (OUTPATIENT)
Facility: HOSPITAL | Age: 79
DRG: 190 | End: 2024-01-31
Attending: INTERNAL MEDICINE
Payer: MEDICARE

## 2024-01-31 LAB
ARTERIAL PATENCY WRIST A: POSITIVE
BASE EXCESS BLD CALC-SCNC: 1.6 MMOL/L
BDY SITE: ABNORMAL
ECHO AO ROOT DIAM: 3.2 CM
ECHO AO ROOT INDEX: 1.84 CM/M2
ECHO AV AREA PEAK VELOCITY: 3.8 CM2
ECHO AV AREA/BSA PEAK VELOCITY: 2.2 CM2/M2
ECHO AV PEAK GRADIENT: 5 MMHG
ECHO AV PEAK VELOCITY: 1.1 M/S
ECHO AV VELOCITY RATIO: 0.91
ECHO BSA: 1.79 M2
ECHO EST RA PRESSURE: 3 MMHG
ECHO LA DIAMETER INDEX: 2.13 CM/M2
ECHO LA DIAMETER: 3.7 CM
ECHO LA TO AORTIC ROOT RATIO: 1.16
ECHO LV FRACTIONAL SHORTENING: 29 % (ref 28–44)
ECHO LV INTERNAL DIMENSION DIASTOLE INDEX: 2.36 CM/M2
ECHO LV INTERNAL DIMENSION DIASTOLIC: 4.1 CM (ref 3.9–5.3)
ECHO LV INTERNAL DIMENSION SYSTOLIC INDEX: 1.67 CM/M2
ECHO LV INTERNAL DIMENSION SYSTOLIC: 2.9 CM
ECHO LV IVSD: 0.7 CM (ref 0.6–0.9)
ECHO LV MASS 2D: 81.7 G (ref 67–162)
ECHO LV MASS INDEX 2D: 46.9 G/M2 (ref 43–95)
ECHO LV POSTERIOR WALL DIASTOLIC: 0.7 CM (ref 0.6–0.9)
ECHO LV RELATIVE WALL THICKNESS RATIO: 0.34
ECHO LVOT AREA: 3.8 CM2
ECHO LVOT DIAM: 2.2 CM
ECHO LVOT PEAK GRADIENT: 4 MMHG
ECHO LVOT PEAK VELOCITY: 1 M/S
ECHO MV A VELOCITY: 1 M/S
ECHO MV AREA PHT: 2.8 CM2
ECHO MV E DECELERATION TIME (DT): 271.7 MS
ECHO MV E VELOCITY: 0.62 M/S
ECHO MV E/A RATIO: 0.62
ECHO MV PRESSURE HALF TIME (PHT): 78.8 MS
ECHO MV REGURGITANT PEAK GRADIENT: 4 MMHG
ECHO MV REGURGITANT PEAK VELOCITY: 1 M/S
GAS FLOW.O2 O2 DELIVERY SYS: ABNORMAL
HCO3 BLD-SCNC: 25.8 MMOL/L (ref 22–26)
PCO2 BLD: 38.5 MMHG (ref 35–45)
PH BLD: 7.44 (ref 7.35–7.45)
PO2 BLD: 76 MMHG (ref 80–100)
SAO2 % BLD: 95.6 % (ref 92–97)
SPECIMEN TYPE: ABNORMAL

## 2024-01-31 PROCEDURE — C9113 INJ PANTOPRAZOLE SODIUM, VIA: HCPCS | Performed by: INTERNAL MEDICINE

## 2024-01-31 PROCEDURE — 82803 BLOOD GASES ANY COMBINATION: CPT

## 2024-01-31 PROCEDURE — 6360000002 HC RX W HCPCS: Performed by: NURSE PRACTITIONER

## 2024-01-31 PROCEDURE — A4216 STERILE WATER/SALINE, 10 ML: HCPCS | Performed by: INTERNAL MEDICINE

## 2024-01-31 PROCEDURE — 2580000003 HC RX 258: Performed by: INTERNAL MEDICINE

## 2024-01-31 PROCEDURE — 94640 AIRWAY INHALATION TREATMENT: CPT

## 2024-01-31 PROCEDURE — 6370000000 HC RX 637 (ALT 250 FOR IP)

## 2024-01-31 PROCEDURE — 93325 DOPPLER ECHO COLOR FLOW MAPG: CPT | Performed by: INTERNAL MEDICINE

## 2024-01-31 PROCEDURE — 2580000003 HC RX 258: Performed by: NURSE PRACTITIONER

## 2024-01-31 PROCEDURE — 93308 TTE F-UP OR LMTD: CPT | Performed by: INTERNAL MEDICINE

## 2024-01-31 PROCEDURE — 6360000002 HC RX W HCPCS: Performed by: INTERNAL MEDICINE

## 2024-01-31 PROCEDURE — 36600 WITHDRAWAL OF ARTERIAL BLOOD: CPT

## 2024-01-31 PROCEDURE — 6370000000 HC RX 637 (ALT 250 FOR IP): Performed by: INTERNAL MEDICINE

## 2024-01-31 PROCEDURE — 71045 X-RAY EXAM CHEST 1 VIEW: CPT

## 2024-01-31 PROCEDURE — 2060000000 HC ICU INTERMEDIATE R&B

## 2024-01-31 PROCEDURE — 93308 TTE F-UP OR LMTD: CPT

## 2024-01-31 PROCEDURE — 2580000003 HC RX 258: Performed by: PHYSICIAN ASSISTANT

## 2024-01-31 PROCEDURE — 6370000000 HC RX 637 (ALT 250 FOR IP): Performed by: NURSE PRACTITIONER

## 2024-01-31 PROCEDURE — 2700000000 HC OXYGEN THERAPY PER DAY

## 2024-01-31 PROCEDURE — 94760 N-INVAS EAR/PLS OXIMETRY 1: CPT

## 2024-01-31 PROCEDURE — 6360000002 HC RX W HCPCS: Performed by: PHYSICIAN ASSISTANT

## 2024-01-31 PROCEDURE — 93321 DOPPLER ECHO F-UP/LMTD STD: CPT | Performed by: INTERNAL MEDICINE

## 2024-01-31 RX ORDER — BENZONATATE 100 MG/1
200 CAPSULE ORAL 3 TIMES DAILY
Status: DISCONTINUED | OUTPATIENT
Start: 2024-01-31 | End: 2024-02-07 | Stop reason: HOSPADM

## 2024-01-31 RX ORDER — IPRATROPIUM BROMIDE AND ALBUTEROL SULFATE 2.5; .5 MG/3ML; MG/3ML
1 SOLUTION RESPIRATORY (INHALATION)
Status: DISCONTINUED | OUTPATIENT
Start: 2024-01-31 | End: 2024-02-02

## 2024-01-31 RX ORDER — FUROSEMIDE 10 MG/ML
20 INJECTION INTRAMUSCULAR; INTRAVENOUS ONCE
Status: COMPLETED | OUTPATIENT
Start: 2024-01-31 | End: 2024-01-31

## 2024-01-31 RX ADMIN — ALPRAZOLAM 0.25 MG: 0.25 TABLET ORAL at 22:12

## 2024-01-31 RX ADMIN — GABAPENTIN 300 MG: 100 CAPSULE ORAL at 21:37

## 2024-01-31 RX ADMIN — SODIUM CHLORIDE, PRESERVATIVE FREE 40 MG: 5 INJECTION INTRAVENOUS at 07:08

## 2024-01-31 RX ADMIN — ARFORMOTEROL TARTRATE: 15 SOLUTION RESPIRATORY (INHALATION) at 20:42

## 2024-01-31 RX ADMIN — BENZONATATE 200 MG: 100 CAPSULE ORAL at 11:23

## 2024-01-31 RX ADMIN — SODIUM CHLORIDE, PRESERVATIVE FREE 40 MG: 5 INJECTION INTRAVENOUS at 17:50

## 2024-01-31 RX ADMIN — IPRATROPIUM BROMIDE AND ALBUTEROL SULFATE 1 DOSE: 2.5; .5 SOLUTION RESPIRATORY (INHALATION) at 11:56

## 2024-01-31 RX ADMIN — MONTELUKAST SODIUM 10 MG: 10 TABLET ORAL at 08:50

## 2024-01-31 RX ADMIN — SODIUM CHLORIDE, PRESERVATIVE FREE 10 ML: 5 INJECTION INTRAVENOUS at 09:03

## 2024-01-31 RX ADMIN — GABAPENTIN 300 MG: 100 CAPSULE ORAL at 14:24

## 2024-01-31 RX ADMIN — WATER 60 MG: 1 INJECTION INTRAMUSCULAR; INTRAVENOUS; SUBCUTANEOUS at 14:24

## 2024-01-31 RX ADMIN — WATER 60 MG: 1 INJECTION INTRAMUSCULAR; INTRAVENOUS; SUBCUTANEOUS at 08:51

## 2024-01-31 RX ADMIN — Medication 5 ML: at 22:12

## 2024-01-31 RX ADMIN — LEVOTHYROXINE SODIUM 88 MCG: 0.09 TABLET ORAL at 07:08

## 2024-01-31 RX ADMIN — GABAPENTIN 300 MG: 100 CAPSULE ORAL at 08:50

## 2024-01-31 RX ADMIN — ACETYLCYSTEINE 600 MG: 200 INHALANT RESPIRATORY (INHALATION) at 07:13

## 2024-01-31 RX ADMIN — IPRATROPIUM BROMIDE AND ALBUTEROL SULFATE 1 DOSE: 2.5; .5 SOLUTION RESPIRATORY (INHALATION) at 20:43

## 2024-01-31 RX ADMIN — SODIUM CHLORIDE, PRESERVATIVE FREE 10 ML: 5 INJECTION INTRAVENOUS at 21:44

## 2024-01-31 RX ADMIN — IPRATROPIUM BROMIDE AND ALBUTEROL SULFATE 1 DOSE: 2.5; .5 SOLUTION RESPIRATORY (INHALATION) at 07:13

## 2024-01-31 RX ADMIN — FUROSEMIDE 20 MG: 10 INJECTION, SOLUTION INTRAMUSCULAR; INTRAVENOUS at 12:28

## 2024-01-31 RX ADMIN — ARFORMOTEROL TARTRATE: 15 SOLUTION RESPIRATORY (INHALATION) at 07:13

## 2024-01-31 RX ADMIN — ENOXAPARIN SODIUM 40 MG: 100 INJECTION SUBCUTANEOUS at 08:51

## 2024-01-31 RX ADMIN — ACETYLCYSTEINE 600 MG: 200 INHALANT RESPIRATORY (INHALATION) at 20:43

## 2024-01-31 RX ADMIN — WATER 60 MG: 1 INJECTION INTRAMUSCULAR; INTRAVENOUS; SUBCUTANEOUS at 20:17

## 2024-01-31 RX ADMIN — BENZONATATE 200 MG: 100 CAPSULE ORAL at 21:36

## 2024-01-31 RX ADMIN — WATER 60 MG: 1 INJECTION INTRAMUSCULAR; INTRAVENOUS; SUBCUTANEOUS at 01:30

## 2024-01-31 NOTE — PROGRESS NOTES
Pulmonary, Critical Care, and Sleep Medicine~Progress Note    Name: Milly Morse MRN: [id number]   : 1945 Hospital: [facility]   Date: 2024 12:19 PM Admission: 2024     Impression Plan   Acute on chronic hypoxic respiratory failure  Acute exacerbation of COPD/IPF  History of significant acid reflux, Nissen fundoplication, Bravo clip.  On PPI currently  Previous echo showed mild PH  Hypothyroidism  History of allergies on allergy shots. We will increase IV steroids, look to reduce tomorrow  acid reflux medicine to IV  O2 titration above 90%  Bronchodilators  Cough suppressants/mucolytic's  Sputum culture, follow   ECHO pending  Agree with lasix  Borderline requiring higher level of care      Daily Progression:      Procal 0.05  Cultures pending  ESR 8  CRP 0.44  Bnp 304    O2 requirements up     Chest x-ray today:  IMPRESSION:     Diffuse bilateral interstitial and alveolar opacities likely represent pulmonary  edema superimposed on chronic lung disease.        Consult Note requested by hospitalist service    Ms. Morse presented for admission secondary to worsening shortness of breath and hypoxemia yesterday directly from her pulmonologist office.  Her primary pulmonologist is Dr. Chicas.  She was noted to be hypoxic even despite supplemental O2 saturations without O2 were in the mid 70s.  She has noted progression since this past October.  Has been on a OFEV up until around 10 days ago.  She has had several bouts of steroids and antibiotics with some improvement while on steroids.  She was on 10 mg daily for several weeks but found no benefit from that.  Does not see correlation with her recent worsening grams daily was stopped.  She has had connective tissue disease workup in the past that negative.  She does not have any exposures on outpatient basis that would result in disease.  She does have significant acid reflux and does have a hiatal hernia.  Acid reflux does  chloride flush 0.9 % injection 5-40 mL  5-40 mL IntraVENous 2 times per day    sodium chloride flush 0.9 % injection 5-40 mL  5-40 mL IntraVENous PRN    0.9 % sodium chloride infusion   IntraVENous PRN    ondansetron (ZOFRAN-ODT) disintegrating tablet 4 mg  4 mg Oral Q8H PRN    Or    ondansetron (ZOFRAN) injection 4 mg  4 mg IntraVENous Q6H PRN    polyethylene glycol (GLYCOLAX) packet 17 g  17 g Oral Daily PRN    enoxaparin (LOVENOX) injection 40 mg  40 mg SubCUTAneous Daily    acetaminophen (TYLENOL) tablet 650 mg  650 mg Oral Q6H PRN    Or    acetaminophen (TYLENOL) suppository 650 mg  650 mg Rectal Q6H PRN    ipratropium 0.5 mg-albuterol 2.5 mg (DUONEB) nebulizer solution 1 Dose  1 Dose Inhalation Q4H WA RT    acetylcysteine (MUCOMYST) 20 % solution 600 mg  600 mg Inhalation BID RT    arformoterol 15 mcg-budesonide 0.25 mg neb solution   Nebulization BID RT    HYDROcodone-chlorpheniramine (TUSSIONEX) 10-8 MG/5ML oral suspension 5 mL  5 mL Oral Q12H PRN    gabapentin (NEURONTIN) capsule 300 mg  300 mg Oral TID       Labs:  ABG Invalid input(s): \"ABG\"     CBC Recent Labs     01/29/24  1127 01/30/24  0243   WBC 12.6* 8.0   HGB 14.7 13.4   HCT 44.6 39.8    148*   .3* 101.0*   MCH 33.7 34.0          Metabolic  Panel Recent Labs     01/29/24  1127 01/30/24  0243    142   K 3.5 4.4   * 113*   CO2 27 25   BUN 12 13   ALT 24  --           Pertinent Labs                Oumar Arciniega PA-C  1/31/2024

## 2024-01-31 NOTE — PROGRESS NOTES
Spiritual Care Assessment/Progress Note  [facility]    Name: Milly Morse MRN: [id number]    Age: 78 y.o.     Sex: female   Language: English     Date: 1/31/2024                           Spiritual Assessment begun in [facility]  Service Provided For:: Patient not available  Referral/Consult From:: Rounding  Encounter Overview/Reason : Attempted Encounter, Initial Encounter    Spiritual beliefs:      [] Involved in a pilar tradition/spiritual practice:      [] Supported by a pilar community:      [] Claims no spiritual orientation:      [] Seeking spiritual identity:           [] Adheres to an individual form of spirituality:      [x] Not able to assess:                Identified resources for coping and support system:   Support System: Unknown       [] Prayer                  [] Devotional reading               [] Music                  [] Guided Imagery     [] Pet visits                                        [] Other: (COMMENT)     Specific area/focus of visit   Encounter:    Crisis:    Spiritual/Emotional needs:    Ritual, Rites and Sacraments:    Grief, Loss, and Adjustments:    Ethics/Mediation:    Behavioral Health:    Palliative Care:    Advance Care Planning:      Plan/Referrals: Other (Comment) (Please contact pastoral care for further consults.)    Narrative:     This was a self initiated visit to Unit: [facility] Bed: 357 / 01 to visit Milly Morse However upon my arrival, the patient was being visited by their provider. The patient was unavailable for a spiritual care visit.       We will continue to follow and assess for spiritual/emotional support during this hospitalization.    Please contact  paging service for any immediate needs.    For additional spiritual care, please contact the  on-call at [phone]).  _____________________________  Nica Vasquez M.Div.   Chaplain Resident

## 2024-01-31 NOTE — PROGRESS NOTES
[facility]                                                                                          Hospitalist Progress Note  Marlene Melton MD  Answering service: [phone] OR [phone] from in house phone        Date of Service:  2024  NAME:  Milly Morse  :  1945  MRN:  [id number]       Admission Summary:   Milly Morse is a 78 y.o. female with pmhx of pulmonary fibrosis, emphysema on 2lnc oxygen at home, GERD and hypothyroidism who presents with shortness of breath, chronic cough and hypoxia.  Patient was seen at pulmonologist office and oxygen saturation was noted to be 80%.  Patient instructed to come to the ED.  Patient denies fever, n/v/d but does attests to feeling poorly on and off since thanksgi.  Patient has been coughing non stop and has been treated with steroids and oral abx and was continuing to feel more malaise, shortness of breath and persistent cough.  Workup in ED showed wbc 12.6, Hg 14.7, plt 181, , K 3.5, BUN 12, Cr 0.78, glucose 121, , Trop 6.  Covid/flu negative.  CTA of chest was negative for PE and PNA but positive for extensive emphysema and hiatal hernia.  Patient admitted to hospital for further workup.      Interval history / Subjective:   Patient seen and examined.   Overnight events noted: patient had an episode of hypoxia.   CXR shows increased pulmonary congestion, gave a dose of Lasix.  Still coughing a lot, does not look like she was getting the prn Tessalon, changed it to scheduled.  On O2 via NC.  Cardiology consult appreciated.     Assessment & Plan:        cute on Chronic Respiratory failure with hypoxia  Emphysema  Pulmonary Fibrosis  -on home oxygen 2 LNC, wean oxygen to keep sat 90%  -duonebs q4  -methylprednisone 40 mg iv daily  -mucomyst neb bid  -formeterol/budesonide neb bid  -tessalon pearls zyrtec  -lasix  -covid/flu negative  -sputum gram stain pending, sputum culture  -CTA negative for

## 2024-01-31 NOTE — CARE COORDINATION
Care Management Initial Assessment       RUR: 10%  Readmission? No  1st IM letter given? Yes   1st  letter given: No-NA    CM met w patient and her neighbor at bedside to introduce self/role as CM and verify the Facesheet. Patient was friendly, alert and oriented to all spheres w logical thought process.  She reports she is ind w all ADLs/IADLs, drives and denies DME.  She reports she purchased (out of pocket) her O2 concentrator and 2 portable tanks and one portable is here for d/c home.  She denies hx of HH or SNF and reports she is active. She denies any concerns about d/c home once medically ready and all questions have been answered.  Anticipated d/c plan is return home and family or friend should be able to provide transportation.      01/31/24 0993   Service Assessment   Patient Orientation Alert and Oriented;Person;Place;Situation   Cognition Alert   History Provided By Patient   Primary Caregiver Self   Support Systems Children   PCP Verified by CM Yes  (Babak Roque)   Last Visit to PCP Within last 6 months   Prior Functional Level Independent in ADLs/IADLs   Can patient return to prior living arrangement Yes   Ability to make needs known: Good   Family able to assist with home care needs: Yes   Would you like for me to discuss the discharge plan with any other family members/significant others, and if so, who? Yes  (Daughter in law, Emelia Morse ([phone]))   Financial Resources Medicare   Social/Functional History   Lives With Alone   Type of Home House   Home Layout One level   Home Access Level entry   Bathroom Shower/Tub Tub/Shower unit   Bathroom Equipment Shower chair  (Doesn't use the shower chair)   Home Equipment Oxygen  (Paid out of pocket for concentrator and 2 portable tanks)   Active  Yes   Occupation Retired   Discharge Planning   Type of Residence House   Services At/After Discharge   Mode of Transport at Discharge Other (see comment)  (Family or friend)   Condition of

## 2024-02-01 LAB
ANION GAP SERPL CALC-SCNC: 8 MMOL/L (ref 5–15)
ARTERIAL PATENCY WRIST A: POSITIVE
BACTERIA SPEC CULT: NORMAL
BASE EXCESS BLD CALC-SCNC: 2.5 MMOL/L
BASOPHILS # BLD: 0 K/UL (ref 0–0.1)
BASOPHILS NFR BLD: 0 % (ref 0–1)
BDY SITE: ABNORMAL
BUN SERPL-MCNC: 23 MG/DL (ref 6–20)
BUN/CREAT SERPL: 26 (ref 12–20)
CALCIUM SERPL-MCNC: 9.3 MG/DL (ref 8.5–10.1)
CHLORIDE SERPL-SCNC: 107 MMOL/L (ref 97–108)
CO2 SERPL-SCNC: 28 MMOL/L (ref 21–32)
CREAT SERPL-MCNC: 0.87 MG/DL (ref 0.55–1.02)
DIFFERENTIAL METHOD BLD: ABNORMAL
EKG ATRIAL RATE: 53 BPM
EKG ATRIAL RATE: 53 BPM
EKG DIAGNOSIS: NORMAL
EKG DIAGNOSIS: NORMAL
EKG P AXIS: 54 DEGREES
EKG P AXIS: 56 DEGREES
EKG P-R INTERVAL: 134 MS
EKG P-R INTERVAL: 138 MS
EKG Q-T INTERVAL: 438 MS
EKG Q-T INTERVAL: 446 MS
EKG QRS DURATION: 78 MS
EKG QRS DURATION: 82 MS
EKG QTC CALCULATION (BAZETT): 410 MS
EKG QTC CALCULATION (BAZETT): 418 MS
EKG R AXIS: 40 DEGREES
EKG R AXIS: 47 DEGREES
EKG T AXIS: 28 DEGREES
EKG T AXIS: 29 DEGREES
EKG VENTRICULAR RATE: 53 BPM
EKG VENTRICULAR RATE: 53 BPM
EOSINOPHIL # BLD: 0 K/UL (ref 0–0.4)
EOSINOPHIL NFR BLD: 0 % (ref 0–7)
ERYTHROCYTE [DISTWIDTH] IN BLOOD BY AUTOMATED COUNT: 13.9 % (ref 11.5–14.5)
GAS FLOW.O2 O2 DELIVERY SYS: ABNORMAL
GLUCOSE SERPL-MCNC: 156 MG/DL (ref 65–100)
GRAM STN SPEC: NORMAL
HCO3 BLD-SCNC: 27.1 MMOL/L (ref 22–26)
HCT VFR BLD AUTO: 45.1 % (ref 35–47)
HGB BLD-MCNC: 14.3 G/DL (ref 11.5–16)
IMM GRANULOCYTES # BLD AUTO: 0.1 K/UL (ref 0–0.04)
IMM GRANULOCYTES NFR BLD AUTO: 1 % (ref 0–0.5)
LYMPHOCYTES # BLD: 0.5 K/UL (ref 0.8–3.5)
LYMPHOCYTES NFR BLD: 4 % (ref 12–49)
MAGNESIUM SERPL-MCNC: 2.8 MG/DL (ref 1.6–2.4)
MCH RBC QN AUTO: 33.1 PG (ref 26–34)
MCHC RBC AUTO-ENTMCNC: 31.7 G/DL (ref 30–36.5)
MCV RBC AUTO: 104.4 FL (ref 80–99)
MONOCYTES # BLD: 0.6 K/UL (ref 0–1)
MONOCYTES NFR BLD: 5 % (ref 5–13)
NEUTS SEG # BLD: 10.1 K/UL (ref 1.8–8)
NEUTS SEG NFR BLD: 90 % (ref 32–75)
NRBC # BLD: 0 K/UL (ref 0–0.01)
NRBC BLD-RTO: 0 PER 100 WBC
O2/TOTAL GAS SETTING VFR VENT: 15 %
PCO2 BLD: 40.9 MMHG (ref 35–45)
PH BLD: 7.43 (ref 7.35–7.45)
PHOSPHATE SERPL-MCNC: 3.4 MG/DL (ref 2.6–4.7)
PLATELET # BLD AUTO: 181 K/UL (ref 150–400)
PMV BLD AUTO: 13 FL (ref 8.9–12.9)
PO2 BLD: 118 MMHG (ref 80–100)
POTASSIUM SERPL-SCNC: 4.1 MMOL/L (ref 3.5–5.1)
PROCALCITONIN SERPL-MCNC: <0.05 NG/ML
RBC # BLD AUTO: 4.32 M/UL (ref 3.8–5.2)
RBC MORPH BLD: ABNORMAL
RBC MORPH BLD: ABNORMAL
SAO2 % BLD: 98.7 % (ref 92–97)
SERVICE CMNT-IMP: NORMAL
SODIUM SERPL-SCNC: 143 MMOL/L (ref 136–145)
SPECIMEN TYPE: ABNORMAL
TROPONIN I SERPL HS-MCNC: 6 NG/L (ref 0–51)
WBC # BLD AUTO: 11.3 K/UL (ref 3.6–11)

## 2024-02-01 PROCEDURE — 93005 ELECTROCARDIOGRAM TRACING: CPT | Performed by: NURSE PRACTITIONER

## 2024-02-01 PROCEDURE — 84100 ASSAY OF PHOSPHORUS: CPT

## 2024-02-01 PROCEDURE — 93005 ELECTROCARDIOGRAM TRACING: CPT | Performed by: HOSPITALIST

## 2024-02-01 PROCEDURE — 80048 BASIC METABOLIC PNL TOTAL CA: CPT

## 2024-02-01 PROCEDURE — 6360000002 HC RX W HCPCS: Performed by: HOSPITALIST

## 2024-02-01 PROCEDURE — 6360000002 HC RX W HCPCS: Performed by: NURSE PRACTITIONER

## 2024-02-01 PROCEDURE — 6360000002 HC RX W HCPCS: Performed by: INTERNAL MEDICINE

## 2024-02-01 PROCEDURE — 36600 WITHDRAWAL OF ARTERIAL BLOOD: CPT

## 2024-02-01 PROCEDURE — 84484 ASSAY OF TROPONIN QUANT: CPT

## 2024-02-01 PROCEDURE — C9113 INJ PANTOPRAZOLE SODIUM, VIA: HCPCS | Performed by: INTERNAL MEDICINE

## 2024-02-01 PROCEDURE — 2580000003 HC RX 258: Performed by: NURSE PRACTITIONER

## 2024-02-01 PROCEDURE — 85025 COMPLETE CBC W/AUTO DIFF WBC: CPT

## 2024-02-01 PROCEDURE — 6370000000 HC RX 637 (ALT 250 FOR IP): Performed by: INTERNAL MEDICINE

## 2024-02-01 PROCEDURE — 94640 AIRWAY INHALATION TREATMENT: CPT

## 2024-02-01 PROCEDURE — 36415 COLL VENOUS BLD VENIPUNCTURE: CPT

## 2024-02-01 PROCEDURE — 6370000000 HC RX 637 (ALT 250 FOR IP)

## 2024-02-01 PROCEDURE — 6360000002 HC RX W HCPCS: Performed by: PHYSICIAN ASSISTANT

## 2024-02-01 PROCEDURE — 84145 PROCALCITONIN (PCT): CPT

## 2024-02-01 PROCEDURE — 6370000000 HC RX 637 (ALT 250 FOR IP): Performed by: NURSE PRACTITIONER

## 2024-02-01 PROCEDURE — A4216 STERILE WATER/SALINE, 10 ML: HCPCS | Performed by: INTERNAL MEDICINE

## 2024-02-01 PROCEDURE — 2580000003 HC RX 258: Performed by: INTERNAL MEDICINE

## 2024-02-01 PROCEDURE — 2060000000 HC ICU INTERMEDIATE R&B

## 2024-02-01 PROCEDURE — 2700000000 HC OXYGEN THERAPY PER DAY

## 2024-02-01 PROCEDURE — 83735 ASSAY OF MAGNESIUM: CPT

## 2024-02-01 PROCEDURE — 94760 N-INVAS EAR/PLS OXIMETRY 1: CPT

## 2024-02-01 PROCEDURE — 82803 BLOOD GASES ANY COMBINATION: CPT

## 2024-02-01 PROCEDURE — 2580000003 HC RX 258: Performed by: PHYSICIAN ASSISTANT

## 2024-02-01 RX ORDER — FUROSEMIDE 10 MG/ML
20 INJECTION INTRAMUSCULAR; INTRAVENOUS ONCE
Status: COMPLETED | OUTPATIENT
Start: 2024-02-01 | End: 2024-02-01

## 2024-02-01 RX ADMIN — BENZONATATE 200 MG: 100 CAPSULE ORAL at 21:27

## 2024-02-01 RX ADMIN — BENZONATATE 200 MG: 100 CAPSULE ORAL at 14:46

## 2024-02-01 RX ADMIN — LEVOTHYROXINE SODIUM 88 MCG: 0.09 TABLET ORAL at 07:05

## 2024-02-01 RX ADMIN — ARFORMOTEROL TARTRATE: 15 SOLUTION RESPIRATORY (INHALATION) at 08:02

## 2024-02-01 RX ADMIN — Medication 5 ML: at 21:27

## 2024-02-01 RX ADMIN — ALPRAZOLAM 0.25 MG: 0.25 TABLET ORAL at 21:27

## 2024-02-01 RX ADMIN — IPRATROPIUM BROMIDE AND ALBUTEROL SULFATE 1 DOSE: 2.5; .5 SOLUTION RESPIRATORY (INHALATION) at 08:02

## 2024-02-01 RX ADMIN — BENZONATATE 200 MG: 100 CAPSULE ORAL at 09:56

## 2024-02-01 RX ADMIN — WATER 60 MG: 1 INJECTION INTRAMUSCULAR; INTRAVENOUS; SUBCUTANEOUS at 02:00

## 2024-02-01 RX ADMIN — GABAPENTIN 300 MG: 100 CAPSULE ORAL at 14:47

## 2024-02-01 RX ADMIN — MONTELUKAST SODIUM 10 MG: 10 TABLET ORAL at 09:56

## 2024-02-01 RX ADMIN — IPRATROPIUM BROMIDE AND ALBUTEROL SULFATE 1 DOSE: 2.5; .5 SOLUTION RESPIRATORY (INHALATION) at 20:26

## 2024-02-01 RX ADMIN — Medication 5 ML: at 09:56

## 2024-02-01 RX ADMIN — ACETYLCYSTEINE 600 MG: 200 INHALANT RESPIRATORY (INHALATION) at 08:03

## 2024-02-01 RX ADMIN — SODIUM CHLORIDE, PRESERVATIVE FREE 10 ML: 5 INJECTION INTRAVENOUS at 21:27

## 2024-02-01 RX ADMIN — ENOXAPARIN SODIUM 40 MG: 100 INJECTION SUBCUTANEOUS at 09:56

## 2024-02-01 RX ADMIN — GABAPENTIN 300 MG: 100 CAPSULE ORAL at 09:56

## 2024-02-01 RX ADMIN — ACETYLCYSTEINE 600 MG: 200 INHALANT RESPIRATORY (INHALATION) at 20:26

## 2024-02-01 RX ADMIN — WATER 60 MG: 1 INJECTION INTRAMUSCULAR; INTRAVENOUS; SUBCUTANEOUS at 09:56

## 2024-02-01 RX ADMIN — IPRATROPIUM BROMIDE AND ALBUTEROL SULFATE 1 DOSE: 2.5; .5 SOLUTION RESPIRATORY (INHALATION) at 14:21

## 2024-02-01 RX ADMIN — GABAPENTIN 300 MG: 100 CAPSULE ORAL at 21:27

## 2024-02-01 RX ADMIN — SODIUM CHLORIDE, PRESERVATIVE FREE 40 MG: 5 INJECTION INTRAVENOUS at 07:05

## 2024-02-01 RX ADMIN — ARFORMOTEROL TARTRATE: 15 SOLUTION RESPIRATORY (INHALATION) at 20:26

## 2024-02-01 RX ADMIN — WATER 60 MG: 1 INJECTION INTRAMUSCULAR; INTRAVENOUS; SUBCUTANEOUS at 21:26

## 2024-02-01 RX ADMIN — FUROSEMIDE 20 MG: 10 INJECTION, SOLUTION INTRAMUSCULAR; INTRAVENOUS at 17:08

## 2024-02-01 RX ADMIN — SODIUM CHLORIDE, PRESERVATIVE FREE 40 MG: 5 INJECTION INTRAVENOUS at 17:08

## 2024-02-01 RX ADMIN — WATER 60 MG: 1 INJECTION INTRAMUSCULAR; INTRAVENOUS; SUBCUTANEOUS at 14:47

## 2024-02-01 NOTE — PROGRESS NOTES
Pulmonary, Critical Care, and Sleep Medicine~Progress Note    Name: Milly Morse MRN: [id number]   : 1945 Hospital: [facility]   Date: 2024 12:36 PM Admission: 2024     Impression Plan   Acute on chronic hypoxic respiratory failure  Acute exacerbation of COPD/IPF  History of significant acid reflux, Nissen fundoplication, Bravo clip.  On PPI currently  Previous echo showed mild PH  Hypothyroidism  History of allergies on allergy shots. We will increase IV steroids, hold dose today  Check procal   acid reflux medicine to IV  O2 titration above 90%; please continue to wean   Bronchodilators  Cough suppressants/mucolytic's  Sputum culture,normal  Chest film tomorrow  Agree with lasix  Borderline requiring higher level of care      Daily Progression:        ECHO:  Left Ventricle Normal left ventricular systolic function with a visually estimated EF of 60 - 65%. Left ventricle size is normal. Increased wall thickness. Findings consistent with mild concentric hypertrophy. Normal wall motion. Normal diastolic function.   Left Atrium Left atrium size is normal.   Right Ventricle Right ventricle size is normal. Normal systolic function.   Right Atrium Right atrium size is normal.   Aortic Valve Valve structure is normal. No regurgitation. No stenosis.   Mitral Valve Valve structure is normal. No regurgitation. No stenosis noted.   Tricuspid Valve Valve structure is normal. No regurgitation. No stenosis noted.   Pulmonic Valve Valve structure is normal. No regurgitation. No stenosis noted.   Aorta Normal sized aortic root.   Pericardium Small (<1 cm) circumferential pericardial effusion present. No indication of cardiac tamponade.     O2 requirements are slowly improving        Procal 0.05  Cultures pending  ESR 8  CRP 0.44  Bnp 304    O2 requirements up     Chest x-ray today:  IMPRESSION:     Diffuse bilateral interstitial and alveolar opacities likely represent pulmonary  edema  arformoterol 15 mcg-budesonide 0.25 mg neb solution   Nebulization BID RT    HYDROcodone-chlorpheniramine (TUSSIONEX) 10-8 MG/5ML oral suspension 5 mL  5 mL Oral Q12H PRN    gabapentin (NEURONTIN) capsule 300 mg  300 mg Oral TID       Labs:  ABG Invalid input(s): \"ABG\"     CBC Recent Labs     01/30/24  0243 02/01/24  0445   WBC 8.0 11.3*   HGB 13.4 14.3   HCT 39.8 45.1   * 181   .0* 104.4*   MCH 34.0 33.1          Metabolic  Panel Recent Labs     01/30/24  0243 02/01/24  0445    143   K 4.4 4.1   * 107   CO2 25 28   BUN 13 23*   MG  --  2.8*   PHOS  --  3.4          Pertinent Labs                Oumar Arciniega PA-C  2/1/2024

## 2024-02-01 NOTE — PROGRESS NOTES
[facility]                                                                                          Hospitalist Progress Note  Callie Cuadra MD  Answering service: [phone] OR [phone] from in house phone        Date of Service:  2024  NAME:  Milly Morse  :  1945  MRN:  [id number]       Admission Summary:     Milly Morse is a 78 y.o. female with pmhx of pulmonary fibrosis, emphysema on 2lnc oxygen at home, GERD and hypothyroidism who presents with shortness of breath, chronic cough and hypoxia.  Patient was seen at pulmonologist office and oxygen saturation was noted to be 80%.  Patient instructed to come to the ED.  Patient denies fever, n/v/d but does attests to feeling poorly on and off since thanksgi.  Patient has been coughing non stop and has been treated with steroids and oral abx and was continuing to feel more malaise, shortness of breath and persistent cough.  Workup in ED showed wbc 12.6, Hg 14.7, plt 181, , K 3.5, BUN 12, Cr 0.78, glucose 121, , Trop 6.  Covid/flu negative.  CTA of chest was negative for PE and PNA but positive for extensive emphysema and hiatal hernia.  Patient admitted to hospital for further workup.      Interval history / Subjective:     Patient seen and examined at the bedside earlier today.  She stated that she had episode of epigastric area sharp pain 10 at 4:50 AM radiating to her right jaw, she stated that she has also history of hiatal hernia, chest pain resolved with with pain medication, has on and off cough and breathing and change it     Assessment & Plan:     Acute on Chronic Respiratory failure with hypoxia due to pulmonary fibrosis, lymphedema, and pulmonary edema  -home oxygen dependent 2 LNC  -On IV Solu-Medrol 60 mg IV Q6, Mucomyst neb, Pulmicort/Brovana, DuoNeb, Tessalon, Singulair, IV Lasix 20 mg x 1 on , on HFNC SpO2 90-91%,  -Check ABG  -covid/flu negative  -sputum gram

## 2024-02-01 NOTE — ADT AUTH CERT
UPDATED PROGRESS NOTES  Patient Information    Patient Name   Milly Morse Legal Sex   Female    1945 [facility]        Progress Notes by Marlene Melton MD at 2024  3:37 PM    Author: Marlene Melton MD Service: Internal Medicine Author Type: Physician   Filed: 2024  3:42 PM Date of Service: 2024  3:37 PM Status: Signed   : Marlene Melton MD (Physician)                                                                                                                                                                                                                        [facility]                                                                                                                                                  Hospitalist Progress Note  Marlene Melton MD  Answering service: [phone] OR [phone] from in house phone         Date of Service:  2024  NAME:  Milly Morse  :  1945  MRN:  [id number]        Admission Summary:   Milly Morse is a 78 y.o. female with pmhx of pulmonary fibrosis, emphysema on 2lnc oxygen at home, GERD and hypothyroidism who presents with shortness of breath, chronic cough and hypoxia.  Patient was seen at pulmonologist office and oxygen saturation was noted to be 80%.  Patient instructed to come to the ED.  Patient denies fever, n/v/d but does attests to feeling poorly on and off since thanksgi.  Patient has been coughing non stop and has been treated with steroids and oral abx and was continuing to feel more malaise, shortness of breath and persistent cough.  Workup in ED showed wbc 12.6, Hg 14.7, plt 181, , K 3.5, BUN 12, Cr 0.78, glucose 121, , Trop 6.  Covid/flu negative.  CTA of chest was negative for PE and PNA but positive for extensive emphysema and hiatal hernia.  Patient admitted to hospital for further workup.       Interval history / Subjective:   Patient seen and

## 2024-02-01 NOTE — ACP (ADVANCE CARE PLANNING)
Advance Care Planning     Advance Care Planning (ACP) Physician/NP/PA Conversation    Date of Conversation: 1/29/2024  Conducted with: Patient with Decision Making Capacity    Healthcare Decision Maker:    Primary Decision Maker: Emelia Morse - Daughter-in-Law - [phone]    Care Preferences:    Hospitalization:  \"If your health worsens and it becomes clear that your chance of recovery is unlikely, what would be your preference regarding hospitalization?\"  The patient would prefer hospitalization.    Ventilation:  \"If you were unable to breath on your own and your chance of recovery was unlikely, what would be your preference about the use of a ventilator (breathing machine) if it was available to you?\"  The patient would desire the use of a ventilator.    Resuscitation:  \"In the event your heart stopped as a result of an underlying serious health condition, would you want attempts made to restart your heart, or would you prefer a natural death?\"  Yes, attempt to resuscitate.    treatment goals, benefit/burden of treatment options, artificial nutrition, ventilation preferences, hospitalization preferences, and resuscitation preferences    Conversation Outcomes / Follow-Up Plan:  ACP in process - information provided, considering goals and options  Reviewed DNR/DNI and patient elects Full Code (Attempt Resuscitation)    Length of Voluntary ACP Conversation in minutes:  16 minutes    Callie Cuadra MD  2/1/2024

## 2024-02-01 NOTE — PROGRESS NOTES
During 0400am vitals, pt had complaints of epigastric pain that was radiating to right jaw, 9/10 pain. Pain then moved to her back. Vitals obtained, BP elevated 167/72. Provider notified, orders place. EKG and blood obtained. Pt pain subsided after about 30 minutes, with BP trending down. Pt able to settle and fall back to sleep with no more complaints of pain.    Kristi Honeycutt RN

## 2024-02-01 NOTE — ADT AUTH CERT
UPDATED PROGRESS NOTES 24  Patient Information    Patient Name   Milly Morse Legal Sex   Female    1945 [facility]        Consults by Oumar Arciniega PA-C at 2024 12:04 PM    Author: Oumar Arciniega PA-C Service: Pulmonology Author Type: Physician Assistant   Filed: 2024 12:11 PM Date of Service: 2024 12:04 PM Status: Cosign Needed   : Oumar Arciniega PA-C (Physician Assistant) Cosign Required: Yes   Consult Orders   1. Consult to Pulmonology [[id number]] ordered by Karthikeyan Miller APRN - LENORA at 24 1536     Expand All Collapse All                                                                                                                                                                                                                                                                                                                                                                                                                                                                                                            Pulmonary, Critical Care, and Sleep Medicine~Consult Note     Name: Milly Morse MRN: [id number]   : 1945 Hospital: [facility]   Date: 2024 12:05 PM Admission: 2024      Impression Plan   Acute on chronic hypoxic respiratory failure  Acute exacerbation of COPD/IPF  History of significant acid reflux, Nissen fundoplication, Bravo clip.  On PPI currently  Previous echo showed mild PH  Hypothyroidism  History of allergies on allergy shots. We will increase IV steroids  Consider switching p.o. acid reflux medicine to IV  O2 titration above 90%  Bronchodilators  Cough suppressants/mucolytic's  Sputum culture  Recommend cardiology evaluation, patient notes bouts of bradycardia  Check a Pro-Roddy, ESR, CRP, BNP  If BN P is elevated will need echo  Discussed with outpatient pulmonologist      Daily Progression:     Consult  Inhalation Q4H WA RT    acetylcysteine (MUCOMYST) 20 % solution 600 mg  600 mg Inhalation BID RT    benzonatate (TESSALON) capsule 100 mg  100 mg Oral TID PRN    pantoprazole (PROTONIX) tablet 40 mg  40 mg Oral QAM AC    arformoterol 15 mcg-budesonide 0.25 mg neb solution   Nebulization BID RT    NONFORMULARY 1 puff  1 puff Inhalation Daily    HYDROcodone-chlorpheniramine (TUSSIONEX) 10-8 MG/5ML oral suspension 5 mL  5 mL Oral Q12H PRN    gabapentin (NEURONTIN) capsule 300 mg  300 mg Oral TID           Current Outpatient Medications   Medication Sig    Green Tea, Beatriz sinensis, (GREEN TEA EXTRACT PO) Take 1,000 mg by mouth daily    Multiple Vitamin (MULTIVITAMIN PO) Take by mouth    acetylcysteine 600 MG CAPS Take 600 mg by mouth daily    Cyanocobalamin 1000 MCG SUBL Take 1,000 mcg by mouth daily    famotidine (PEPCID) 20 MG tablet Take 1 tablet by mouth    fexofenadine (ALLEGRA) 180 MG tablet Take 1 tablet by mouth daily    furosemide (LASIX) 20 MG tablet Take 1 tablet by mouth daily    levoFLOXacin (LEVAQUIN) 750 MG tablet Take 1 tablet by mouth every 48 hours    levothyroxine (SYNTHROID) 88 MCG tablet Take 1 tablet by mouth every morning (before breakfast)    montelukast (SINGULAIR) 10 MG tablet Take 1 tablet by mouth daily    Nintedanib Esylate (OFEV) 150 MG CAPS Take 150 mg by mouth 2 times daily    omeprazole (PRILOSEC) 40 MG delayed release capsule Take 1 capsule by mouth daily    tiotropium (SPIRIVA RESPIMAT) 2.5 MCG/ACT AERS inhaler Inhale 2 puffs into the lungs daily                     Labs:  ABG Invalid input(s): \"ABG\"      CBC      Recent Labs     01/29/24  1127 01/30/24  0243   WBC 12.6* 8.0   HGB 14.7 13.4   HCT 44.6 39.8    148*   .3* 101.0*   MCH 33.7 34.0         Metabolic  Panel      Recent Labs     01/29/24  1127 01/30/24  0243    142   K 3.5 4.4   * 113*   CO2 27 25   BUN 12 13   ALT 24  --          Pertinent Labs                 Oumar Arciniega PA-C  1/30/2024

## 2024-02-01 NOTE — ADT AUTH CERT
UPDATED PROGRESS NOTES 24  Patient Information    Patient Name   Milly Morse Legal Sex   Female    1945 [facility]        Progress Notes by Oumar Arciniega PA-C at 2024 12:19 PM    Author: Oumar Arciniega PA-C Service: Pulmonology Author Type: Physician Assistant   Filed: 2024 12:23 PM Date of Service: 2024 12:19 PM Status: Cosign Needed   : Oumar Arciniega PA-C (Physician Assistant) Cosign Required: Yes   Expand All Collapse All                                                                                                                                                                                                                                                                                                            Pulmonary, Critical Care, and Sleep Medicine~Progress Note     Name: Milly Morse MRN: [id number]   : 1945 Hospital: [facility]   Date: 2024 12:19 PM Admission: 2024      Impression Plan   Acute on chronic hypoxic respiratory failure  Acute exacerbation of COPD/IPF  History of significant acid reflux, Nissen fundoplication, Bravo clip.  On PPI currently  Previous echo showed mild PH  Hypothyroidism  History of allergies on allergy shots. We will increase IV steroids, look to reduce tomorrow  acid reflux medicine to IV  O2 titration above 90%  Bronchodilators  Cough suppressants/mucolytic's  Sputum culture, follow   ECHO pending  Agree with lasix  Borderline requiring higher level of care       Daily Progression:       Procal 0.05  Cultures pending  ESR 8  CRP 0.44  Bnp 304     O2 requirements up      Chest x-ray today:  IMPRESSION:     Diffuse bilateral interstitial and alveolar opacities likely represent pulmonary  edema superimposed on chronic lung disease.          Consult Note requested by hospitalist service     Ms. Morse presented for admission secondary to worsening shortness of breath and  lungs daily     Yes Provider, MD Raquel   Chromium 200 MCG CAPS Take 1 capsule by mouth daily     Yes Provider, MD Raquel   Green Tea, Beatriz sinensis, (GREEN TEA EXTRACT PO) Take 1,000 mg by mouth daily       Automatic Reconciliation, Ar   Multiple Vitamin (MULTIVITAMIN PO) Take by mouth       Automatic Reconciliation, Ar   acetylcysteine 600 MG CAPS Take 600 mg by mouth daily       Automatic Reconciliation, Ar   Cyanocobalamin 1000 MCG SUBL Take 1,000 mcg by mouth daily       Automatic Reconciliation, Ar   levothyroxine (SYNTHROID) 88 MCG tablet Take 1 tablet by mouth Daily Takes daily except Sundays       Automatic Reconciliation, Ar   montelukast (SINGULAIR) 10 MG tablet Take 1 tablet by mouth daily       Automatic Reconciliation, Ar   tiotropium (SPIRIVA RESPIMAT) 2.5 MCG/ACT AERS inhaler Inhale 2 puffs into the lungs daily       Automatic Reconciliation, Ar         No Known Allergies   Social History            Tobacco Use    Smoking status: Former    Smokeless tobacco: Never    Tobacco comments:       Quit smoking: 10-15 yrs ago   Substance Use Topics    Alcohol use: Yes      Family History         Family History   Problem Relation Age of Onset    Breast Cancer Paternal Aunt      Pulmonary Embolism Maternal Aunt      Breast Cancer Maternal Aunt      Diabetes Mother      Other Brother           colon disease - part of colon removed/was not cancer/had a \"bag\"         OBJECTIVE:                   Vital Signs:                                            /71   Pulse 56   Temp 98.2 °F (36.8 °C) (Oral)   Resp 20   Wt 74.8 kg (164 lb 14.5 oz)   SpO2 93%   BMI 31.16 kg/m²                 Temp (24hrs), Av.7 °F (36.5 °C), Min:97.5 °F (36.4 °C), Max:98.2 °F (36.8 °C)                   Intake/Output:                               Last shift:           No intake/output data recorded.                              Last 3 shifts:      No intake/output data recorded.

## 2024-02-02 ENCOUNTER — APPOINTMENT (OUTPATIENT)
Facility: HOSPITAL | Age: 79
DRG: 190 | End: 2024-02-02
Payer: MEDICARE

## 2024-02-02 LAB
ALBUMIN SERPL-MCNC: 3 G/DL (ref 3.5–5)
ALBUMIN/GLOB SERPL: 0.9 (ref 1.1–2.2)
ALP SERPL-CCNC: 58 U/L (ref 45–117)
ALT SERPL-CCNC: 24 U/L (ref 12–78)
ANION GAP SERPL CALC-SCNC: 8 MMOL/L (ref 5–15)
AST SERPL-CCNC: 13 U/L (ref 15–37)
BILIRUB SERPL-MCNC: 0.4 MG/DL (ref 0.2–1)
BUN SERPL-MCNC: 25 MG/DL (ref 6–20)
BUN/CREAT SERPL: 26 (ref 12–20)
CALCIUM SERPL-MCNC: 9.3 MG/DL (ref 8.5–10.1)
CHLORIDE SERPL-SCNC: 107 MMOL/L (ref 97–108)
CO2 SERPL-SCNC: 26 MMOL/L (ref 21–32)
CREAT SERPL-MCNC: 0.97 MG/DL (ref 0.55–1.02)
ERYTHROCYTE [DISTWIDTH] IN BLOOD BY AUTOMATED COUNT: 13.9 % (ref 11.5–14.5)
GLOBULIN SER CALC-MCNC: 3.3 G/DL (ref 2–4)
GLUCOSE SERPL-MCNC: 168 MG/DL (ref 65–100)
HCT VFR BLD AUTO: 42.2 % (ref 35–47)
HGB BLD-MCNC: 14.1 G/DL (ref 11.5–16)
MCH RBC QN AUTO: 33.9 PG (ref 26–34)
MCHC RBC AUTO-ENTMCNC: 33.4 G/DL (ref 30–36.5)
MCV RBC AUTO: 101.4 FL (ref 80–99)
NRBC # BLD: 0 K/UL (ref 0–0.01)
NRBC BLD-RTO: 0 PER 100 WBC
PLATELET # BLD AUTO: 165 K/UL (ref 150–400)
PMV BLD AUTO: 13 FL (ref 8.9–12.9)
POTASSIUM SERPL-SCNC: 4.6 MMOL/L (ref 3.5–5.1)
PROT SERPL-MCNC: 6.3 G/DL (ref 6.4–8.2)
RBC # BLD AUTO: 4.16 M/UL (ref 3.8–5.2)
SODIUM SERPL-SCNC: 141 MMOL/L (ref 136–145)
WBC # BLD AUTO: 9 K/UL (ref 3.6–11)

## 2024-02-02 PROCEDURE — 94660 CPAP INITIATION&MGMT: CPT

## 2024-02-02 PROCEDURE — 6370000000 HC RX 637 (ALT 250 FOR IP): Performed by: NURSE PRACTITIONER

## 2024-02-02 PROCEDURE — 6370000000 HC RX 637 (ALT 250 FOR IP): Performed by: PHYSICIAN ASSISTANT

## 2024-02-02 PROCEDURE — 5A09357 ASSISTANCE WITH RESPIRATORY VENTILATION, LESS THAN 24 CONSECUTIVE HOURS, CONTINUOUS POSITIVE AIRWAY PRESSURE: ICD-10-PCS | Performed by: HOSPITALIST

## 2024-02-02 PROCEDURE — 6370000000 HC RX 637 (ALT 250 FOR IP): Performed by: INTERNAL MEDICINE

## 2024-02-02 PROCEDURE — 97161 PT EVAL LOW COMPLEX 20 MIN: CPT

## 2024-02-02 PROCEDURE — 6360000002 HC RX W HCPCS: Performed by: NURSE PRACTITIONER

## 2024-02-02 PROCEDURE — 36415 COLL VENOUS BLD VENIPUNCTURE: CPT

## 2024-02-02 PROCEDURE — 80053 COMPREHEN METABOLIC PANEL: CPT

## 2024-02-02 PROCEDURE — 97165 OT EVAL LOW COMPLEX 30 MIN: CPT

## 2024-02-02 PROCEDURE — 97116 GAIT TRAINING THERAPY: CPT

## 2024-02-02 PROCEDURE — 94640 AIRWAY INHALATION TREATMENT: CPT

## 2024-02-02 PROCEDURE — 6360000002 HC RX W HCPCS: Performed by: INTERNAL MEDICINE

## 2024-02-02 PROCEDURE — C9113 INJ PANTOPRAZOLE SODIUM, VIA: HCPCS | Performed by: INTERNAL MEDICINE

## 2024-02-02 PROCEDURE — 6370000000 HC RX 637 (ALT 250 FOR IP)

## 2024-02-02 PROCEDURE — 2580000003 HC RX 258: Performed by: NURSE PRACTITIONER

## 2024-02-02 PROCEDURE — 97535 SELF CARE MNGMENT TRAINING: CPT

## 2024-02-02 PROCEDURE — A4216 STERILE WATER/SALINE, 10 ML: HCPCS | Performed by: INTERNAL MEDICINE

## 2024-02-02 PROCEDURE — 6360000002 HC RX W HCPCS: Performed by: PHYSICIAN ASSISTANT

## 2024-02-02 PROCEDURE — 2060000000 HC ICU INTERMEDIATE R&B

## 2024-02-02 PROCEDURE — 2580000003 HC RX 258: Performed by: INTERNAL MEDICINE

## 2024-02-02 PROCEDURE — 2700000000 HC OXYGEN THERAPY PER DAY

## 2024-02-02 PROCEDURE — 85027 COMPLETE CBC AUTOMATED: CPT

## 2024-02-02 PROCEDURE — 71045 X-RAY EXAM CHEST 1 VIEW: CPT

## 2024-02-02 PROCEDURE — 94760 N-INVAS EAR/PLS OXIMETRY 1: CPT

## 2024-02-02 PROCEDURE — 2580000003 HC RX 258: Performed by: PHYSICIAN ASSISTANT

## 2024-02-02 PROCEDURE — 6370000000 HC RX 637 (ALT 250 FOR IP): Performed by: HOSPITALIST

## 2024-02-02 RX ORDER — IPRATROPIUM BROMIDE AND ALBUTEROL SULFATE 2.5; .5 MG/3ML; MG/3ML
1 SOLUTION RESPIRATORY (INHALATION) 2 TIMES DAILY
Status: DISCONTINUED | OUTPATIENT
Start: 2024-02-02 | End: 2024-02-07 | Stop reason: HOSPADM

## 2024-02-02 RX ORDER — FUROSEMIDE 10 MG/ML
20 INJECTION INTRAMUSCULAR; INTRAVENOUS 2 TIMES DAILY
Status: COMPLETED | OUTPATIENT
Start: 2024-02-02 | End: 2024-02-02

## 2024-02-02 RX ORDER — PANTOPRAZOLE SODIUM 40 MG/1
40 TABLET, DELAYED RELEASE ORAL
Status: DISCONTINUED | OUTPATIENT
Start: 2024-02-02 | End: 2024-02-07 | Stop reason: HOSPADM

## 2024-02-02 RX ADMIN — BENZONATATE 200 MG: 100 CAPSULE ORAL at 08:35

## 2024-02-02 RX ADMIN — WATER 40 MG: 1 INJECTION INTRAMUSCULAR; INTRAVENOUS; SUBCUTANEOUS at 15:18

## 2024-02-02 RX ADMIN — WATER 60 MG: 1 INJECTION INTRAMUSCULAR; INTRAVENOUS; SUBCUTANEOUS at 08:30

## 2024-02-02 RX ADMIN — BENZONATATE 200 MG: 100 CAPSULE ORAL at 20:19

## 2024-02-02 RX ADMIN — WATER 60 MG: 1 INJECTION INTRAMUSCULAR; INTRAVENOUS; SUBCUTANEOUS at 03:57

## 2024-02-02 RX ADMIN — SODIUM CHLORIDE, PRESERVATIVE FREE 10 ML: 5 INJECTION INTRAVENOUS at 08:32

## 2024-02-02 RX ADMIN — FUROSEMIDE 20 MG: 10 INJECTION, SOLUTION INTRAMUSCULAR; INTRAVENOUS at 11:38

## 2024-02-02 RX ADMIN — FUROSEMIDE 20 MG: 10 INJECTION, SOLUTION INTRAMUSCULAR; INTRAVENOUS at 17:18

## 2024-02-02 RX ADMIN — SODIUM CHLORIDE, PRESERVATIVE FREE 10 ML: 5 INJECTION INTRAVENOUS at 20:19

## 2024-02-02 RX ADMIN — GABAPENTIN 300 MG: 100 CAPSULE ORAL at 08:35

## 2024-02-02 RX ADMIN — GABAPENTIN 300 MG: 100 CAPSULE ORAL at 20:19

## 2024-02-02 RX ADMIN — ENOXAPARIN SODIUM 40 MG: 100 INJECTION SUBCUTANEOUS at 08:35

## 2024-02-02 RX ADMIN — GABAPENTIN 300 MG: 100 CAPSULE ORAL at 14:00

## 2024-02-02 RX ADMIN — ALPRAZOLAM 0.25 MG: 0.25 TABLET ORAL at 20:19

## 2024-02-02 RX ADMIN — ARFORMOTEROL TARTRATE: 15 SOLUTION RESPIRATORY (INHALATION) at 07:30

## 2024-02-02 RX ADMIN — LEVOTHYROXINE SODIUM 88 MCG: 0.09 TABLET ORAL at 06:44

## 2024-02-02 RX ADMIN — IPRATROPIUM BROMIDE AND ALBUTEROL SULFATE 1 DOSE: 2.5; .5 SOLUTION RESPIRATORY (INHALATION) at 07:30

## 2024-02-02 RX ADMIN — IPRATROPIUM BROMIDE AND ALBUTEROL SULFATE 1 DOSE: 2.5; .5 SOLUTION RESPIRATORY (INHALATION) at 20:30

## 2024-02-02 RX ADMIN — SODIUM CHLORIDE, PRESERVATIVE FREE 40 MG: 5 INJECTION INTRAVENOUS at 06:44

## 2024-02-02 RX ADMIN — ACETYLCYSTEINE 600 MG: 200 INHALANT RESPIRATORY (INHALATION) at 07:30

## 2024-02-02 RX ADMIN — ACETYLCYSTEINE 600 MG: 200 INHALANT RESPIRATORY (INHALATION) at 20:29

## 2024-02-02 RX ADMIN — MONTELUKAST SODIUM 10 MG: 10 TABLET ORAL at 08:35

## 2024-02-02 RX ADMIN — PANTOPRAZOLE SODIUM 40 MG: 40 TABLET, DELAYED RELEASE ORAL at 15:18

## 2024-02-02 RX ADMIN — ARFORMOTEROL TARTRATE: 15 SOLUTION RESPIRATORY (INHALATION) at 20:30

## 2024-02-02 RX ADMIN — BENZONATATE 200 MG: 100 CAPSULE ORAL at 14:00

## 2024-02-02 NOTE — PLAN OF CARE
Problem: Physical Therapy - Adult  Goal: By Discharge: Performs mobility at highest level of function for planned discharge setting.  See evaluation for individualized goals.  Description: FUNCTIONAL STATUS PRIOR TO ADMISSION: Patient was independent and active without use of DME. Pt was driving and recently retired.    HOME SUPPORT PRIOR TO ADMISSION: The patient lived alone with no local support. States she is able to \"call a neighbor\" if needed.    Physical Therapy Goals  Initiated 2/2/2024  1.  Patient will perform sit to stand with independence within 7 day(s).  2.  Patient will transfer from bed to chair and chair to bed with independence using the least restrictive device within 7 day(s).  3.  Patient will ambulate with independence for 150-300 feet with self-imposed energy conservation/safety techniques as needed within 7 day(s).     Outcome: Progressing   PHYSICAL THERAPY EVALUATION    Patient: Milly Morse (78 y.o. female)  Date: 2/2/2024  Primary Diagnosis: COPD (chronic obstructive pulmonary disease) ([facility]) [J44.9]  Pulmonary fibrosis ([facility]) [J84.10]  COPD exacerbation ([facility]) [J44.1]  Acute on chronic respiratory failure with hypoxia ([facility]) [J96.21]       Precautions: Restrictions/Precautions: Fall Risk ; >89% SpO2 goal per pulm.      ASSESSMENT :   DEFICITS/IMPAIRMENTS:   Pt seen following RN clearance. She was admitted 1/29 for above with associated hypoxia. Pt with acute imaging suggestive of pulmonary fibrosis, emphysema, and hiatal hernia. Negative for PE/PNA. Pt denies chest pain throughout this session and is agreeable to POC: bed mobility, transfers, gait training, and OOB to chair with vitals monitored and education for energy conservation/monitoring provided. The patient is limited by decreased strength, activity tolerance, endurance, safety awareness, attention/concentration, coordination, balance, and overall functional mobility from an independent baseline status.  Pt reports being on 2L NC  Complexity : Zero comorbidities / personal factors that will impact the outcome / POC LOW Complexity : 1-2 Standardized tests and measures addressing body structure, function, activity limitation and / or participation in recreation  LOW Complexity : Stable, uncomplicated  AM-PAC  LOW    Based on the above components, the patient evaluation is determined to be of the following complexity level: Low

## 2024-02-02 NOTE — PROGRESS NOTES
[facility]                                                                                          Hospitalist Progress Note  Callie Cuadra MD  Answering service: [phone] OR [phone] from in house phone        Date of Service:  2024  NAME:  Milly Morse  :  1945  MRN:  [id number]       Admission Summary:     Milly Morse is a 78 y.o. female with pmhx of pulmonary fibrosis, emphysema on 2lnc oxygen at home, GERD and hypothyroidism who presents with shortness of breath, chronic cough and hypoxia.  Patient was seen at pulmonologist office and oxygen saturation was noted to be 80%.  Patient instructed to come to the ED.  Patient denies fever, n/v/d but does attests to feeling poorly on and off since thanksgi.  Patient has been coughing non stop and has been treated with steroids and oral abx and was continuing to feel more malaise, shortness of breath and persistent cough.  Workup in ED showed wbc 12.6, Hg 14.7, plt 181, , K 3.5, BUN 12, Cr 0.78, glucose 121, , Trop 6.  Covid/flu negative.  CTA of chest was negative for PE and PNA but positive for extensive emphysema and hiatal hernia.  Patient admitted to hospital for further workup.      Interval history / Subjective:     Patient seen and examined at the bedside earlier today.  She stated that her shortness of breath is improving, no left-sided chest pain or palpitation     Assessment & Plan:     Acute on Chronic Respiratory failure with hypoxia due to pulmonary fibrosis, emphysema and pulmonary edema  -home oxygen dependent 2 LNC  -IV Solu-Medrol cut back to 40 mg IV q 8 hrs, on Mucomyst neb, Pulmicort/Brovana, DuoNeb, Tessalon, Singulair, IV Lasix 20 mg x 1 on ,   -on Midflow 5 l/m, SpO2 93-95%, CPAP q hs   -ABG  on FiO2 15 pH was 7.43, pCO2 40.9, pO2 118 on   -covid/flu negative  -sputum culture normal georgina  -CTA negative for pna, PE, +hiatal hernia,  (SOLU-MEDROL) 60 mg in sterile water 0.96 mL injection  60 mg IntraVENous Q6H    benzonatate (TESSALON) capsule 200 mg  200 mg Oral TID    perflutren lipid microspheres (DEFINITY) injection 1.5 mL  1.5 mL IntraVENous ONCE PRN    ipratropium 0.5 mg-albuterol 2.5 mg (DUONEB) nebulizer solution 1 Dose  1 Dose Inhalation Q6H WA RT    pantoprazole (PROTONIX) 40 mg in sodium chloride (PF) 0.9 % 10 mL injection  40 mg IntraVENous Q12H    ALPRAZolam (XANAX) tablet 0.25 mg  0.25 mg Oral Q6H PRN    levothyroxine (SYNTHROID) tablet 88 mcg  88 mcg Oral QAM AC    montelukast (SINGULAIR) tablet 10 mg  10 mg Oral Daily    sodium chloride flush 0.9 % injection 5-40 mL  5-40 mL IntraVENous 2 times per day    sodium chloride flush 0.9 % injection 5-40 mL  5-40 mL IntraVENous PRN    0.9 % sodium chloride infusion   IntraVENous PRN    ondansetron (ZOFRAN-ODT) disintegrating tablet 4 mg  4 mg Oral Q8H PRN    Or    ondansetron (ZOFRAN) injection 4 mg  4 mg IntraVENous Q6H PRN    polyethylene glycol (GLYCOLAX) packet 17 g  17 g Oral Daily PRN    enoxaparin (LOVENOX) injection 40 mg  40 mg SubCUTAneous Daily    acetaminophen (TYLENOL) tablet 650 mg  650 mg Oral Q6H PRN    Or    acetaminophen (TYLENOL) suppository 650 mg  650 mg Rectal Q6H PRN    acetylcysteine (MUCOMYST) 20 % solution 600 mg  600 mg Inhalation BID RT    arformoterol 15 mcg-budesonide 0.25 mg neb solution   Nebulization BID RT    HYDROcodone-chlorpheniramine (TUSSIONEX) 10-8 MG/5ML oral suspension 5 mL  5 mL Oral Q12H PRN    gabapentin (NEURONTIN) capsule 300 mg  300 mg Oral TID     ______________________________________________________________________  EXPECTED LENGTH OF STAY: Unable to retrieve estimated LOS  ACTUAL LENGTH OF STAY:          4                 Callie Cuadra MD

## 2024-02-02 NOTE — PROGRESS NOTES
Pulmonary, Critical Care, and Sleep Medicine~Progress Note    Name: Milly Morse MRN: [id number]   : 1945 Hospital: [facility]   Date: 2024 11:58 AM Admission: 2024     Impression Plan   Acute on chronic hypoxic respiratory failure  Acute exacerbation of COPD/IPF  History of significant acid reflux, Nissen fundoplication, Bravo clip.  On PPI currently  Previous echo showed mild PH  Hypothyroidism  History of allergies on allergy shots. We will increase IV steroids, reduce today   Check procal; 0.05  acid reflux medicine to IV  Cpap for suspected SARAH  O2 titration above 89%; please continue to wean. Will continue to have probe on nose  PT/OT  Bronchodilators  Cough suppressants/mucolytic's  Sputum culture,normal  Agree with lasix, increase today  Spoke to hospitalist, RN, patient and daughter. Gave update to outpatient pulmonologist      Daily Progression:      O2 down to 7lpm   Noted chest film  Sats still drop with ambulation  ABG looks ok         ECHO:  Left Ventricle Normal left ventricular systolic function with a visually estimated EF of 60 - 65%. Left ventricle size is normal. Increased wall thickness. Findings consistent with mild concentric hypertrophy. Normal wall motion. Normal diastolic function.   Left Atrium Left atrium size is normal.   Right Ventricle Right ventricle size is normal. Normal systolic function.   Right Atrium Right atrium size is normal.   Aortic Valve Valve structure is normal. No regurgitation. No stenosis.   Mitral Valve Valve structure is normal. No regurgitation. No stenosis noted.   Tricuspid Valve Valve structure is normal. No regurgitation. No stenosis noted.   Pulmonic Valve Valve structure is normal. No regurgitation. No stenosis noted.   Aorta Normal sized aortic root.   Pericardium Small (<1 cm) circumferential pericardial effusion present. No indication of cardiac tamponade.     O2 requirements are slowly  (NEXIUM) 40 MG PACK Take 1 packet by mouth daily   Yes Raquel Smart MD   gabapentin (NEURONTIN) 300 MG capsule Take 1 capsule by mouth 3 times daily. Max Daily Amount: 900 mg   Yes Raquel Smart MD   fluticasone (ARNUITY ELLIPTA) 200 MCG/ACT AEPB Inhale 1 puff into the lungs daily   Yes Raquel Smart MD   Chromium 200 MCG CAPS Take 1 capsule by mouth daily   Yes Raquel Smart MD   Green Tea, Beatriz sinensis, (GREEN TEA EXTRACT PO) Take 1,000 mg by mouth daily    Automatic Reconciliation, Ar   Multiple Vitamin (MULTIVITAMIN PO) Take by mouth    Automatic Reconciliation, Ar   acetylcysteine 600 MG CAPS Take 600 mg by mouth daily    Automatic Reconciliation, Ar   Cyanocobalamin 1000 MCG SUBL Take 1,000 mcg by mouth daily    Automatic Reconciliation, Ar   levothyroxine (SYNTHROID) 88 MCG tablet Take 1 tablet by mouth Daily Takes daily except Sundays    Automatic Reconciliation, Ar   montelukast (SINGULAIR) 10 MG tablet Take 1 tablet by mouth daily    Automatic Reconciliation, Ar   tiotropium (SPIRIVA RESPIMAT) 2.5 MCG/ACT AERS inhaler Inhale 2 puffs into the lungs daily    Automatic Reconciliation, Ar     No Known Allergies   Social History     Tobacco Use    Smoking status: Former    Smokeless tobacco: Never    Tobacco comments:     Quit smoking: 10-15 yrs ago   Substance Use Topics    Alcohol use: Yes      Family History   Problem Relation Age of Onset    Breast Cancer Paternal Aunt     Pulmonary Embolism Maternal Aunt     Breast Cancer Maternal Aunt     Diabetes Mother     Other Brother         colon disease - part of colon removed/was not cancer/had a \"bag\"     OBJECTIVE:     Vital Signs:     /63   Pulse 63   Temp 97.6 °F (36.4 °C) (Oral)   Resp 18   Ht 1.549 m (5' 0.98\")   Wt 74.8 kg (164 lb 14.5 oz)   SpO2 95%   BMI 31.17 kg/m²    Temp (24hrs), Av.6 °F (36.4 °C), Min:97.3 °F (36.3 °C), Max:97.8 °F (36.6 °C)     Intake/Output:     Last shift: 701 -

## 2024-02-02 NOTE — PLAN OF CARE
Problem: Occupational Therapy - Adult  Goal: By Discharge: Performs self-care activities at highest level of function for planned discharge setting.  See evaluation for individualized goals.  Description: FUNCTIONAL STATUS PRIOR TO ADMISSION:  pt lives alone, recently retired, reports independent with ADLs, not using AD. She has nocturnal O2  Receives Help From: Neighbor, ADL Assistance: Independent,  ,  ,  ,  ,  , Homemaking Assistance: Independent, Ambulation Assistance: Independent, Transfer Assistance: Independent, Active : Yes     HOME SUPPORT: Patient lived alone with no local support. Pt states she can have \"neighbor\" assist if needed.    Occupational Therapy Goals:  Initiated 2/2/2024  1.  Patient will perform ADLs in bathroom with Modified [place] within 7 day(s).  2.  Patient will perform lower body dressing with Modified [place] within 7 day(s).  3.  Patient will perform seated sponge bathing with Modified [place] within 7 day(s).  4.  Patient will perform toilet transfers in bathroom with Modified [place]  within 7 day(s).  5.  Patient will perform all aspects of toileting with Modified [place] within 7 day(s).  6.  Patient will utilize energy conservation techniques during functional activities with verbal cues within 7 day(s).   Outcome: Progressing    OCCUPATIONAL THERAPY EVALUATION    Patient: Milly Morse (78 y.o. female)  Date: 2/2/2024  Primary Diagnosis: COPD (chronic obstructive pulmonary disease) ([facility]) [J44.9]  Pulmonary fibrosis (HCC) [J84.10]  COPD exacerbation ([facility]) [J44.1]  Acute on chronic respiratory failure with hypoxia ([facility]) [J96.21]         Precautions: Bed Alarm                  ASSESSMENT :  The patient is limited by decreased functional mobility, independence in ADLs, activity tolerance, endurance, coordination.    Based on the impairments listed above pt presents on 5L/min, decreased activity tolerance, B hand tremors (R >L) and decline in  available evidence. Asking the patient, friends/relatives and nurses are the usual sources, but direct observation and common sense are also important. However direct testing is not needed.  5. Usually the patient's performance over the preceding 24-48 hours is important, but occasionally longer periods will be relevant.  6. Middle categories imply that the patient supplies over 50 per cent of the effort.  7. Use of aids to be independent is allowed.    Score Interpretation (from [place] )    Independent   60-79 Minimally independent   40-59 Partially dependent   20-39 Very dependent   <20 Totally dependent     -Lloyd Cardenas, Barthel, D.W. (1965). Functional evaluation: the Barthel Index. Md [facility] Med J 142.  -DESTINI Jules, SIMBA Molina (). The Barthel activities of daily living index: self-reporting versus actual performance in the old (> or = 75 years). Journal of American Geriatric Society 45(7), 832-836.   -WEST Haile, ELIJAH Richardson., Brayden, MAZIN., Alvin, A.JONNY. (1999). Measuring the change in disability after inpatient rehabilitation; comparison of the responsiveness of the Barthel Index and Functional Douglas Measure. Journal of Neurology, Neurosurgery, and Psychiatry, 66(4), 480-484.  -Shaquille Cardenas, N.J.A, PROSPER Kenney, & Vimal M.A. (2004) Assessment of post-stroke quality of life in cost-effectiveness studies: The usefulness of the Barthel Index and the EuroQoL-5D. Quality of Life Research, 13, 427-43                                                                                                                                                                                                                                 Pain Ratin/10   Pain Intervention(s):       Activity Tolerance:   Fair     After treatment:   Patient left in no apparent distress sitting up in chair, Call bell within reach, and Bed/ chair alarm activated    COMMUNICATION/EDUCATION:   The

## 2024-02-03 LAB
ALBUMIN SERPL-MCNC: 3.1 G/DL (ref 3.5–5)
ALBUMIN/GLOB SERPL: 1 (ref 1.1–2.2)
ALP SERPL-CCNC: 64 U/L (ref 45–117)
ALT SERPL-CCNC: 28 U/L (ref 12–78)
ANION GAP SERPL CALC-SCNC: 6 MMOL/L (ref 5–15)
AST SERPL-CCNC: 9 U/L (ref 15–37)
BILIRUB SERPL-MCNC: 0.4 MG/DL (ref 0.2–1)
BUN SERPL-MCNC: 27 MG/DL (ref 6–20)
BUN/CREAT SERPL: 33 (ref 12–20)
CALCIUM SERPL-MCNC: 8.8 MG/DL (ref 8.5–10.1)
CHLORIDE SERPL-SCNC: 103 MMOL/L (ref 97–108)
CO2 SERPL-SCNC: 31 MMOL/L (ref 21–32)
CREAT SERPL-MCNC: 0.81 MG/DL (ref 0.55–1.02)
ERYTHROCYTE [DISTWIDTH] IN BLOOD BY AUTOMATED COUNT: 13.9 % (ref 11.5–14.5)
GLOBULIN SER CALC-MCNC: 3.1 G/DL (ref 2–4)
GLUCOSE SERPL-MCNC: 150 MG/DL (ref 65–100)
HCT VFR BLD AUTO: 43.4 % (ref 35–47)
HGB BLD-MCNC: 14.5 G/DL (ref 11.5–16)
MCH RBC QN AUTO: 33.7 PG (ref 26–34)
MCHC RBC AUTO-ENTMCNC: 33.4 G/DL (ref 30–36.5)
MCV RBC AUTO: 100.9 FL (ref 80–99)
NRBC # BLD: 0 K/UL (ref 0–0.01)
NRBC BLD-RTO: 0 PER 100 WBC
PLATELET # BLD AUTO: 159 K/UL (ref 150–400)
PMV BLD AUTO: 12.9 FL (ref 8.9–12.9)
POTASSIUM SERPL-SCNC: 3.5 MMOL/L (ref 3.5–5.1)
PROT SERPL-MCNC: 6.2 G/DL (ref 6.4–8.2)
RBC # BLD AUTO: 4.3 M/UL (ref 3.8–5.2)
SODIUM SERPL-SCNC: 140 MMOL/L (ref 136–145)
WBC # BLD AUTO: 9.8 K/UL (ref 3.6–11)

## 2024-02-03 PROCEDURE — 2580000003 HC RX 258: Performed by: PHYSICIAN ASSISTANT

## 2024-02-03 PROCEDURE — 94640 AIRWAY INHALATION TREATMENT: CPT

## 2024-02-03 PROCEDURE — 80053 COMPREHEN METABOLIC PANEL: CPT

## 2024-02-03 PROCEDURE — 6370000000 HC RX 637 (ALT 250 FOR IP): Performed by: INTERNAL MEDICINE

## 2024-02-03 PROCEDURE — 6360000002 HC RX W HCPCS: Performed by: NURSE PRACTITIONER

## 2024-02-03 PROCEDURE — 6370000000 HC RX 637 (ALT 250 FOR IP): Performed by: HOSPITALIST

## 2024-02-03 PROCEDURE — 85027 COMPLETE CBC AUTOMATED: CPT

## 2024-02-03 PROCEDURE — 2060000000 HC ICU INTERMEDIATE R&B

## 2024-02-03 PROCEDURE — 36415 COLL VENOUS BLD VENIPUNCTURE: CPT

## 2024-02-03 PROCEDURE — 6370000000 HC RX 637 (ALT 250 FOR IP): Performed by: PHYSICIAN ASSISTANT

## 2024-02-03 PROCEDURE — 6370000000 HC RX 637 (ALT 250 FOR IP): Performed by: NURSE PRACTITIONER

## 2024-02-03 PROCEDURE — 6370000000 HC RX 637 (ALT 250 FOR IP)

## 2024-02-03 PROCEDURE — 2580000003 HC RX 258: Performed by: NURSE PRACTITIONER

## 2024-02-03 PROCEDURE — 6360000002 HC RX W HCPCS: Performed by: PHYSICIAN ASSISTANT

## 2024-02-03 RX ADMIN — ENOXAPARIN SODIUM 40 MG: 100 INJECTION SUBCUTANEOUS at 09:37

## 2024-02-03 RX ADMIN — WATER 40 MG: 1 INJECTION INTRAMUSCULAR; INTRAVENOUS; SUBCUTANEOUS at 09:36

## 2024-02-03 RX ADMIN — BENZONATATE 200 MG: 100 CAPSULE ORAL at 20:53

## 2024-02-03 RX ADMIN — ALPRAZOLAM 0.25 MG: 0.25 TABLET ORAL at 20:53

## 2024-02-03 RX ADMIN — PANTOPRAZOLE SODIUM 40 MG: 40 TABLET, DELAYED RELEASE ORAL at 07:04

## 2024-02-03 RX ADMIN — IPRATROPIUM BROMIDE AND ALBUTEROL SULFATE 1 DOSE: 2.5; .5 SOLUTION RESPIRATORY (INHALATION) at 08:20

## 2024-02-03 RX ADMIN — IPRATROPIUM BROMIDE AND ALBUTEROL SULFATE 1 DOSE: 2.5; .5 SOLUTION RESPIRATORY (INHALATION) at 21:03

## 2024-02-03 RX ADMIN — WATER 40 MG: 1 INJECTION INTRAMUSCULAR; INTRAVENOUS; SUBCUTANEOUS at 00:23

## 2024-02-03 RX ADMIN — BENZONATATE 200 MG: 100 CAPSULE ORAL at 09:35

## 2024-02-03 RX ADMIN — BENZONATATE 200 MG: 100 CAPSULE ORAL at 14:03

## 2024-02-03 RX ADMIN — WATER 40 MG: 1 INJECTION INTRAMUSCULAR; INTRAVENOUS; SUBCUTANEOUS at 16:33

## 2024-02-03 RX ADMIN — ARFORMOTEROL TARTRATE: 15 SOLUTION RESPIRATORY (INHALATION) at 08:20

## 2024-02-03 RX ADMIN — GABAPENTIN 300 MG: 100 CAPSULE ORAL at 14:03

## 2024-02-03 RX ADMIN — ACETYLCYSTEINE 600 MG: 200 INHALANT RESPIRATORY (INHALATION) at 08:20

## 2024-02-03 RX ADMIN — GABAPENTIN 300 MG: 100 CAPSULE ORAL at 09:36

## 2024-02-03 RX ADMIN — PANTOPRAZOLE SODIUM 40 MG: 40 TABLET, DELAYED RELEASE ORAL at 16:33

## 2024-02-03 RX ADMIN — ARFORMOTEROL TARTRATE: 15 SOLUTION RESPIRATORY (INHALATION) at 21:03

## 2024-02-03 RX ADMIN — MONTELUKAST SODIUM 10 MG: 10 TABLET ORAL at 09:36

## 2024-02-03 RX ADMIN — GABAPENTIN 300 MG: 100 CAPSULE ORAL at 20:53

## 2024-02-03 RX ADMIN — LEVOTHYROXINE SODIUM 88 MCG: 0.09 TABLET ORAL at 07:04

## 2024-02-03 RX ADMIN — SODIUM CHLORIDE, PRESERVATIVE FREE 5 ML: 5 INJECTION INTRAVENOUS at 20:53

## 2024-02-03 RX ADMIN — ACETYLCYSTEINE 600 MG: 200 INHALANT RESPIRATORY (INHALATION) at 21:03

## 2024-02-03 RX ADMIN — SODIUM CHLORIDE, PRESERVATIVE FREE 10 ML: 5 INJECTION INTRAVENOUS at 09:37

## 2024-02-03 NOTE — PLAN OF CARE
Problem: Safety - Adult  Goal: Free from fall injury  Outcome: Progressing     Problem: Discharge Planning  Goal: Discharge to home or other facility with appropriate resources  Outcome: Progressing  Flowsheets (Taken 2/2/2024 2000)  Discharge to home or other facility with appropriate resources:   Identify barriers to discharge with patient and caregiver   Arrange for needed discharge resources and transportation as appropriate   Identify discharge learning needs (meds, wound care, etc)   Refer to discharge planning if patient needs post-hospital services based on physician order or complex needs related to functional status, cognitive ability or social support system     Problem: Physical Therapy - Adult  Goal: By Discharge: Performs mobility at highest level of function for planned discharge setting.  See evaluation for individualized goals.  Description: FUNCTIONAL STATUS PRIOR TO ADMISSION: Patient was independent and active without use of DME. Pt was driving and recently retired.    HOME SUPPORT PRIOR TO ADMISSION: The patient lived alone with no local support. States she is able to \"call a neighbor\" if needed.    Physical Therapy Goals  Initiated 2/2/2024  1.  Patient will perform sit to stand with independence within 7 day(s).  2.  Patient will transfer from bed to chair and chair to bed with independence using the least restrictive device within 7 day(s).  3.  Patient will ambulate with independence for 150-300 feet with self-imposed energy conservation/safety techniques as needed within 7 day(s).     2/2/2024 1350 by Bala Walker, PT  Outcome: Progressing     Problem: Occupational Therapy - Adult  Goal: By Discharge: Performs self-care activities at highest level of function for planned discharge setting.  See evaluation for individualized goals.  Description: FUNCTIONAL STATUS PRIOR TO ADMISSION:  pt lives alone, recently retired, reports independent with ADLs, not using AD. She has nocturnal

## 2024-02-03 NOTE — PROGRESS NOTES
[facility]                                                                                          Hospitalist Progress Note  Callie Cuadra MD  Answering service: [phone] OR [phone] from in house phone        Date of Service:  2/3/2024  NAME:  Milly Morse  :  1945  MRN:  [id number]       Admission Summary:     Milly Morse is a 78 y.o. female with pmhx of pulmonary fibrosis, emphysema on 2lnc oxygen at home, GERD and hypothyroidism who presents with shortness of breath, chronic cough and hypoxia.  Patient was seen at pulmonologist office and oxygen saturation was noted to be 80%.  Patient instructed to come to the ED.  Patient denies fever, n/v/d but does attests to feeling poorly on and off since thanksgi.  Patient has been coughing non stop and has been treated with steroids and oral abx and was continuing to feel more malaise, shortness of breath and persistent cough.  Workup in ED showed wbc 12.6, Hg 14.7, plt 181, , K 3.5, BUN 12, Cr 0.78, glucose 121, , Trop 6.  Covid/flu negative.  CTA of chest was negative for PE and PNA but positive for extensive emphysema and hiatal hernia.  Patient admitted to hospital for further workup.      Interval history / Subjective:     Patient seen and examined at the bedside.  She stated that she feels tired and easily irritable, but not depressed, she said her shortness of breath is improving, no left-sided chest pain or palpitation,      Assessment & Plan:     Acute on Chronic Respiratory failure with hypoxia due to pulmonary fibrosis, emphysema and pulmonary edema  -home oxygen dependent 2 LNC  -IV Solu-Medrol cut back to 40 mg IV q 8 hrs, on Mucomyst neb, Pulmicort/Brovana, DuoNeb, Tessalon, Singulair, IV Lasix 20 mg x 1 on ,   -on Midflow 5 l/m, SpO2 %, started CPAP q hs last night,   -ABG  on FiO2 15 pH was 7.43, pCO2 40.9, pO2 118 on   -covid/flu negative  -sputum culture  (LOVENOX) injection 40 mg  40 mg SubCUTAneous Daily    acetaminophen (TYLENOL) tablet 650 mg  650 mg Oral Q6H PRN    Or    acetaminophen (TYLENOL) suppository 650 mg  650 mg Rectal Q6H PRN    acetylcysteine (MUCOMYST) 20 % solution 600 mg  600 mg Inhalation BID RT    arformoterol 15 mcg-budesonide 0.25 mg neb solution   Nebulization BID RT    HYDROcodone-chlorpheniramine (TUSSIONEX) 10-8 MG/5ML oral suspension 5 mL  5 mL Oral Q12H PRN    gabapentin (NEURONTIN) capsule 300 mg  300 mg Oral TID     ______________________________________________________________________  EXPECTED LENGTH OF STAY: Unable to retrieve estimated LOS  ACTUAL LENGTH OF STAY:          5                 Callie Cuadra MD

## 2024-02-04 LAB
ALBUMIN SERPL-MCNC: 2.8 G/DL (ref 3.5–5)
ALBUMIN/GLOB SERPL: 1 (ref 1.1–2.2)
ALP SERPL-CCNC: 57 U/L (ref 45–117)
ALT SERPL-CCNC: 19 U/L (ref 12–78)
ANION GAP SERPL CALC-SCNC: 6 MMOL/L (ref 5–15)
AST SERPL-CCNC: 6 U/L (ref 15–37)
BILIRUB SERPL-MCNC: 0.2 MG/DL (ref 0.2–1)
BUN SERPL-MCNC: 29 MG/DL (ref 6–20)
BUN/CREAT SERPL: 29 (ref 12–20)
CALCIUM SERPL-MCNC: 8 MG/DL (ref 8.5–10.1)
CHLORIDE SERPL-SCNC: 105 MMOL/L (ref 97–108)
CO2 SERPL-SCNC: 29 MMOL/L (ref 21–32)
CREAT SERPL-MCNC: 0.99 MG/DL (ref 0.55–1.02)
ERYTHROCYTE [DISTWIDTH] IN BLOOD BY AUTOMATED COUNT: 13.6 % (ref 11.5–14.5)
GLOBULIN SER CALC-MCNC: 2.7 G/DL (ref 2–4)
GLUCOSE SERPL-MCNC: 235 MG/DL (ref 65–100)
HCT VFR BLD AUTO: 41.1 % (ref 35–47)
HGB BLD-MCNC: 14 G/DL (ref 11.5–16)
MCH RBC QN AUTO: 34.1 PG (ref 26–34)
MCHC RBC AUTO-ENTMCNC: 34.1 G/DL (ref 30–36.5)
MCV RBC AUTO: 100 FL (ref 80–99)
NRBC # BLD: 0 K/UL (ref 0–0.01)
NRBC BLD-RTO: 0 PER 100 WBC
PLATELET # BLD AUTO: 126 K/UL (ref 150–400)
PMV BLD AUTO: 12.9 FL (ref 8.9–12.9)
POTASSIUM SERPL-SCNC: 3.8 MMOL/L (ref 3.5–5.1)
PROT SERPL-MCNC: 5.5 G/DL (ref 6.4–8.2)
RBC # BLD AUTO: 4.11 M/UL (ref 3.8–5.2)
SODIUM SERPL-SCNC: 140 MMOL/L (ref 136–145)
WBC # BLD AUTO: 7 K/UL (ref 3.6–11)

## 2024-02-04 PROCEDURE — 6370000000 HC RX 637 (ALT 250 FOR IP): Performed by: HOSPITALIST

## 2024-02-04 PROCEDURE — 85027 COMPLETE CBC AUTOMATED: CPT

## 2024-02-04 PROCEDURE — 6370000000 HC RX 637 (ALT 250 FOR IP): Performed by: INTERNAL MEDICINE

## 2024-02-04 PROCEDURE — 6370000000 HC RX 637 (ALT 250 FOR IP)

## 2024-02-04 PROCEDURE — 6360000002 HC RX W HCPCS: Performed by: PHYSICIAN ASSISTANT

## 2024-02-04 PROCEDURE — 2060000000 HC ICU INTERMEDIATE R&B

## 2024-02-04 PROCEDURE — 2580000003 HC RX 258: Performed by: PHYSICIAN ASSISTANT

## 2024-02-04 PROCEDURE — 6360000002 HC RX W HCPCS: Performed by: NURSE PRACTITIONER

## 2024-02-04 PROCEDURE — 2580000003 HC RX 258: Performed by: NURSE PRACTITIONER

## 2024-02-04 PROCEDURE — 36415 COLL VENOUS BLD VENIPUNCTURE: CPT

## 2024-02-04 PROCEDURE — 94640 AIRWAY INHALATION TREATMENT: CPT

## 2024-02-04 PROCEDURE — 6370000000 HC RX 637 (ALT 250 FOR IP): Performed by: PHYSICIAN ASSISTANT

## 2024-02-04 PROCEDURE — 6370000000 HC RX 637 (ALT 250 FOR IP): Performed by: NURSE PRACTITIONER

## 2024-02-04 PROCEDURE — 80053 COMPREHEN METABOLIC PANEL: CPT

## 2024-02-04 RX ADMIN — BENZONATATE 200 MG: 100 CAPSULE ORAL at 21:05

## 2024-02-04 RX ADMIN — ARFORMOTEROL TARTRATE: 15 SOLUTION RESPIRATORY (INHALATION) at 20:40

## 2024-02-04 RX ADMIN — SODIUM CHLORIDE, PRESERVATIVE FREE 10 ML: 5 INJECTION INTRAVENOUS at 09:05

## 2024-02-04 RX ADMIN — ACETYLCYSTEINE 600 MG: 200 INHALANT RESPIRATORY (INHALATION) at 08:39

## 2024-02-04 RX ADMIN — WATER 40 MG: 1 INJECTION INTRAMUSCULAR; INTRAVENOUS; SUBCUTANEOUS at 09:05

## 2024-02-04 RX ADMIN — BENZONATATE 200 MG: 100 CAPSULE ORAL at 09:04

## 2024-02-04 RX ADMIN — WATER 40 MG: 1 INJECTION INTRAMUSCULAR; INTRAVENOUS; SUBCUTANEOUS at 00:23

## 2024-02-04 RX ADMIN — SODIUM CHLORIDE, PRESERVATIVE FREE 10 ML: 5 INJECTION INTRAVENOUS at 21:07

## 2024-02-04 RX ADMIN — BENZONATATE 200 MG: 100 CAPSULE ORAL at 14:10

## 2024-02-04 RX ADMIN — ALPRAZOLAM 0.25 MG: 0.25 TABLET ORAL at 21:06

## 2024-02-04 RX ADMIN — GABAPENTIN 300 MG: 100 CAPSULE ORAL at 21:05

## 2024-02-04 RX ADMIN — WATER 40 MG: 1 INJECTION INTRAMUSCULAR; INTRAVENOUS; SUBCUTANEOUS at 17:26

## 2024-02-04 RX ADMIN — ACETYLCYSTEINE 600 MG: 200 INHALANT RESPIRATORY (INHALATION) at 20:40

## 2024-02-04 RX ADMIN — GABAPENTIN 300 MG: 100 CAPSULE ORAL at 14:10

## 2024-02-04 RX ADMIN — IPRATROPIUM BROMIDE AND ALBUTEROL SULFATE 1 DOSE: 2.5; .5 SOLUTION RESPIRATORY (INHALATION) at 08:39

## 2024-02-04 RX ADMIN — PANTOPRAZOLE SODIUM 40 MG: 40 TABLET, DELAYED RELEASE ORAL at 07:16

## 2024-02-04 RX ADMIN — IPRATROPIUM BROMIDE AND ALBUTEROL SULFATE 1 DOSE: 2.5; .5 SOLUTION RESPIRATORY (INHALATION) at 20:40

## 2024-02-04 RX ADMIN — ARFORMOTEROL TARTRATE: 15 SOLUTION RESPIRATORY (INHALATION) at 08:39

## 2024-02-04 RX ADMIN — ENOXAPARIN SODIUM 40 MG: 100 INJECTION SUBCUTANEOUS at 09:05

## 2024-02-04 RX ADMIN — GABAPENTIN 300 MG: 100 CAPSULE ORAL at 09:05

## 2024-02-04 RX ADMIN — LEVOTHYROXINE SODIUM 88 MCG: 0.09 TABLET ORAL at 07:16

## 2024-02-04 RX ADMIN — MONTELUKAST SODIUM 10 MG: 10 TABLET ORAL at 09:04

## 2024-02-04 RX ADMIN — PANTOPRAZOLE SODIUM 40 MG: 40 TABLET, DELAYED RELEASE ORAL at 17:26

## 2024-02-04 NOTE — PROGRESS NOTES
[facility]                                                                                          Hospitalist Progress Note  Callie Cuadra MD  Answering service: [phone] OR [phone] from in house phone        Date of Service:  2024  NAME:  Milly Morse  :  1945  MRN:  [id number]       Admission Summary:     Milly Morse is a 78 y.o. female with pmhx of pulmonary fibrosis, emphysema on 2lnc oxygen at home, GERD and hypothyroidism who presents with shortness of breath, chronic cough and hypoxia.  Patient was seen at pulmonologist office and oxygen saturation was noted to be 80%.  Patient instructed to come to the ED.  Patient denies fever, n/v/d but does attests to feeling poorly on and off since thanksgi.  Patient has been coughing non stop and has been treated with steroids and oral abx and was continuing to feel more malaise, shortness of breath and persistent cough.  Workup in ED showed wbc 12.6, Hg 14.7, plt 181, , K 3.5, BUN 12, Cr 0.78, glucose 121, , Trop 6.  Covid/flu negative.  CTA of chest was negative for PE and PNA but positive for extensive emphysema and hiatal hernia.  Patient admitted to hospital for further workup.      Interval history / Subjective:     Patient seen and examined at the bedside.  She stated that her breathing improving, no chest pain,  feels weak and she said she still need to get use to the CPAP     Assessment & Plan:     Acute on Chronic Respiratory failure with hypoxia due to pulmonary fibrosis, emphysema and pulmonary edema  -home oxygen dependent 2 LNC  -on IV Solu-Medrol 40 mg IV q 8 hrs, Mucomyst neb, Pulmicort/Brovana, DuoNeb, Tessalon, Singulair, IV Lasix 20 mg x 1 on ,   -on Midflow 4 l/m, SpO2 98-99%, started CPAP q hs last night,   -ABG  on FiO2 15 pH was 7.43, pCO2 40.9, pO2 118 on   -covid/flu negative  -sputum culture normal georgina  -CTA negative for pna, PE, +hiatal hernia,  (!) 149/71   Pulse: 55   Resp: 16   Temp: 97.1 °F (36.2 °C)   SpO2:          Intake/Output Summary (Last 24 hours) at 2/4/2024 0745  Last data filed at 2/3/2024 1800  Gross per 24 hour   Intake 240 ml   Output 1300 ml   Net -1060 ml          Physical Examination:     I had a face to face encounter with this patient and independently examined them on 2/4/2024 as outlined below:          General : alert x 3, awake, no acute distress,   HEENT: PEERL, EOMI, moist mucus membrane, TM clear  Neck: supple, no JVD, no meningeal signs  Chest: Decrease bronchial breath sound bilaterally and right lower crepitation to auscultation, distant breath sounds; diminished breath sounds at right base post; no wheezing, bibasilar rales;  CVS: S1 S2 heard, RRR  Abd: soft/ non tender, non distended, BS physiological,   Ext: no clubbing, no cyanosis, no edema  Neuro/Psych: pleasant mood and affect, CN 2-12 grossly intact, no focal neuro deficits, speeach clear and fluent;  Skin: warm     Data Review:    Review and/or order of clinical lab test  Review and/or order of tests in the radiology section of CPT  Review and/or order of tests in the medicine section of CPT      I have personally and independently reviewed all pertinent labs, diagnostic studies, imaging, and have provided independent interpretation of the same.     Labs:     Recent Labs     02/03/24 0034 02/04/24 0036   WBC 9.8 7.0   HGB 14.5 14.0   HCT 43.4 41.1    126*       Recent Labs     02/02/24 0407 02/03/24 0034 02/04/24 0036    140 140   K 4.6 3.5 3.8    103 105   CO2 26 31 29   BUN 25* 27* 29*       Recent Labs     02/02/24 0407 02/03/24 0034 02/04/24 0036   ALT 24 28 19   GLOB 3.3 3.1 2.7       No results for input(s): \"INR\", \"APTT\" in the last 72 hours.    Invalid input(s): \"PTP\"   No results for input(s): \"TIBC\", \"FERR\" in the last 72 hours.    Invalid input(s): \"FE\", \"PSAT\"   No results found for: \"FOL\", \"RBCF\"   No results for input(s):

## 2024-02-04 NOTE — PLAN OF CARE
Problem: Safety - Adult  Goal: Free from fall injury  Outcome: Progressing     Problem: Discharge Planning  Goal: Discharge to home or other facility with appropriate resources  Outcome: Progressing  Flowsheets (Taken 2/3/2024 2000)  Discharge to home or other facility with appropriate resources:   Identify barriers to discharge with patient and caregiver   Arrange for needed discharge resources and transportation as appropriate   Identify discharge learning needs (meds, wound care, etc)   Refer to discharge planning if patient needs post-hospital services based on physician order or complex needs related to functional status, cognitive ability or social support system

## 2024-02-05 LAB
ALBUMIN SERPL-MCNC: 2.7 G/DL (ref 3.5–5)
ALBUMIN/GLOB SERPL: 1 (ref 1.1–2.2)
ALP SERPL-CCNC: 52 U/L (ref 45–117)
ALT SERPL-CCNC: 24 U/L (ref 12–78)
ANION GAP SERPL CALC-SCNC: 5 MMOL/L (ref 5–15)
AST SERPL-CCNC: 9 U/L (ref 15–37)
BILIRUB SERPL-MCNC: 0.4 MG/DL (ref 0.2–1)
BUN SERPL-MCNC: 22 MG/DL (ref 6–20)
BUN/CREAT SERPL: 30 (ref 12–20)
CALCIUM SERPL-MCNC: 8.7 MG/DL (ref 8.5–10.1)
CHLORIDE SERPL-SCNC: 109 MMOL/L (ref 97–108)
CO2 SERPL-SCNC: 28 MMOL/L (ref 21–32)
CREAT SERPL-MCNC: 0.73 MG/DL (ref 0.55–1.02)
ERYTHROCYTE [DISTWIDTH] IN BLOOD BY AUTOMATED COUNT: 13.3 % (ref 11.5–14.5)
GLOBULIN SER CALC-MCNC: 2.7 G/DL (ref 2–4)
GLUCOSE SERPL-MCNC: 159 MG/DL (ref 65–100)
HCT VFR BLD AUTO: 41.5 % (ref 35–47)
HGB BLD-MCNC: 13.8 G/DL (ref 11.5–16)
MCH RBC QN AUTO: 33.2 PG (ref 26–34)
MCHC RBC AUTO-ENTMCNC: 33.3 G/DL (ref 30–36.5)
MCV RBC AUTO: 99.8 FL (ref 80–99)
NRBC # BLD: 0 K/UL (ref 0–0.01)
NRBC BLD-RTO: 0 PER 100 WBC
PLATELET # BLD AUTO: 119 K/UL (ref 150–400)
PMV BLD AUTO: 12.5 FL (ref 8.9–12.9)
POTASSIUM SERPL-SCNC: 4.5 MMOL/L (ref 3.5–5.1)
PROT SERPL-MCNC: 5.4 G/DL (ref 6.4–8.2)
RBC # BLD AUTO: 4.16 M/UL (ref 3.8–5.2)
SODIUM SERPL-SCNC: 142 MMOL/L (ref 136–145)
WBC # BLD AUTO: 7.9 K/UL (ref 3.6–11)

## 2024-02-05 PROCEDURE — 2580000003 HC RX 258: Performed by: NURSE PRACTITIONER

## 2024-02-05 PROCEDURE — 85027 COMPLETE CBC AUTOMATED: CPT

## 2024-02-05 PROCEDURE — 94640 AIRWAY INHALATION TREATMENT: CPT

## 2024-02-05 PROCEDURE — 6360000002 HC RX W HCPCS: Performed by: PHYSICIAN ASSISTANT

## 2024-02-05 PROCEDURE — 6370000000 HC RX 637 (ALT 250 FOR IP): Performed by: HOSPITALIST

## 2024-02-05 PROCEDURE — 97116 GAIT TRAINING THERAPY: CPT

## 2024-02-05 PROCEDURE — 6360000002 HC RX W HCPCS: Performed by: NURSE PRACTITIONER

## 2024-02-05 PROCEDURE — 97530 THERAPEUTIC ACTIVITIES: CPT

## 2024-02-05 PROCEDURE — 2060000000 HC ICU INTERMEDIATE R&B

## 2024-02-05 PROCEDURE — 6370000000 HC RX 637 (ALT 250 FOR IP): Performed by: PHYSICIAN ASSISTANT

## 2024-02-05 PROCEDURE — 36415 COLL VENOUS BLD VENIPUNCTURE: CPT

## 2024-02-05 PROCEDURE — 6370000000 HC RX 637 (ALT 250 FOR IP): Performed by: INTERNAL MEDICINE

## 2024-02-05 PROCEDURE — 6370000000 HC RX 637 (ALT 250 FOR IP)

## 2024-02-05 PROCEDURE — 6370000000 HC RX 637 (ALT 250 FOR IP): Performed by: NURSE PRACTITIONER

## 2024-02-05 PROCEDURE — 2580000003 HC RX 258: Performed by: PHYSICIAN ASSISTANT

## 2024-02-05 PROCEDURE — 94760 N-INVAS EAR/PLS OXIMETRY 1: CPT

## 2024-02-05 PROCEDURE — 2700000000 HC OXYGEN THERAPY PER DAY

## 2024-02-05 PROCEDURE — 80053 COMPREHEN METABOLIC PANEL: CPT

## 2024-02-05 PROCEDURE — 97535 SELF CARE MNGMENT TRAINING: CPT

## 2024-02-05 RX ORDER — PREDNISONE 20 MG/1
40 TABLET ORAL DAILY
Status: DISCONTINUED | OUTPATIENT
Start: 2024-02-06 | End: 2024-02-07 | Stop reason: HOSPADM

## 2024-02-05 RX ADMIN — ACETYLCYSTEINE 600 MG: 200 INHALANT RESPIRATORY (INHALATION) at 20:40

## 2024-02-05 RX ADMIN — WATER 40 MG: 1 INJECTION INTRAMUSCULAR; INTRAVENOUS; SUBCUTANEOUS at 00:38

## 2024-02-05 RX ADMIN — GABAPENTIN 300 MG: 100 CAPSULE ORAL at 15:39

## 2024-02-05 RX ADMIN — MONTELUKAST SODIUM 10 MG: 10 TABLET ORAL at 09:10

## 2024-02-05 RX ADMIN — WATER 40 MG: 1 INJECTION INTRAMUSCULAR; INTRAVENOUS; SUBCUTANEOUS at 09:11

## 2024-02-05 RX ADMIN — PANTOPRAZOLE SODIUM 40 MG: 40 TABLET, DELAYED RELEASE ORAL at 15:39

## 2024-02-05 RX ADMIN — IPRATROPIUM BROMIDE AND ALBUTEROL SULFATE 1 DOSE: 2.5; .5 SOLUTION RESPIRATORY (INHALATION) at 07:38

## 2024-02-05 RX ADMIN — BENZONATATE 200 MG: 100 CAPSULE ORAL at 15:39

## 2024-02-05 RX ADMIN — ACETYLCYSTEINE 600 MG: 200 INHALANT RESPIRATORY (INHALATION) at 07:39

## 2024-02-05 RX ADMIN — SODIUM CHLORIDE, PRESERVATIVE FREE 10 ML: 5 INJECTION INTRAVENOUS at 21:21

## 2024-02-05 RX ADMIN — ENOXAPARIN SODIUM 40 MG: 100 INJECTION SUBCUTANEOUS at 09:11

## 2024-02-05 RX ADMIN — SODIUM CHLORIDE, PRESERVATIVE FREE 10 ML: 5 INJECTION INTRAVENOUS at 09:14

## 2024-02-05 RX ADMIN — PANTOPRAZOLE SODIUM 40 MG: 40 TABLET, DELAYED RELEASE ORAL at 05:54

## 2024-02-05 RX ADMIN — GABAPENTIN 300 MG: 100 CAPSULE ORAL at 21:21

## 2024-02-05 RX ADMIN — IPRATROPIUM BROMIDE AND ALBUTEROL SULFATE 1 DOSE: 2.5; .5 SOLUTION RESPIRATORY (INHALATION) at 20:40

## 2024-02-05 RX ADMIN — ARFORMOTEROL TARTRATE: 15 SOLUTION RESPIRATORY (INHALATION) at 07:38

## 2024-02-05 RX ADMIN — LEVOTHYROXINE SODIUM 88 MCG: 0.09 TABLET ORAL at 05:54

## 2024-02-05 RX ADMIN — BENZONATATE 200 MG: 100 CAPSULE ORAL at 21:21

## 2024-02-05 RX ADMIN — GABAPENTIN 300 MG: 100 CAPSULE ORAL at 09:10

## 2024-02-05 RX ADMIN — ARFORMOTEROL TARTRATE: 15 SOLUTION RESPIRATORY (INHALATION) at 20:40

## 2024-02-05 RX ADMIN — BENZONATATE 200 MG: 100 CAPSULE ORAL at 09:10

## 2024-02-05 NOTE — PLAN OF CARE
Problem: Occupational Therapy - Adult  Goal: By Discharge: Performs self-care activities at highest level of function for planned discharge setting.  See evaluation for individualized goals.  Description: FUNCTIONAL STATUS PRIOR TO ADMISSION:  pt lives alone, recently retired, reports independent with ADLs, not using AD. She has nocturnal O2  Receives Help From: Neighbor, ADL Assistance: Independent,  ,  ,  ,  ,  , Homemaking Assistance: Independent, Ambulation Assistance: Independent, Transfer Assistance: Independent, Active : Yes     HOME SUPPORT: Patient lived alone with no local support. Pt states she can have \"neighbor\" assist if needed.    Occupational Therapy Goals:  Initiated 2/2/2024  1.  Patient will perform ADLs in bathroom with Modified [place] within 7 day(s).  2.  Patient will perform lower body dressing with Modified [place] within 7 day(s).  3.  Patient will perform seated sponge bathing with Modified [place] within 7 day(s).  4.  Patient will perform toilet transfers in bathroom with Modified [place]  within 7 day(s).  5.  Patient will perform all aspects of toileting with Modified [place] within 7 day(s).  6.  Patient will utilize energy conservation techniques during functional activities with verbal cues within 7 day(s).   Outcome: Progressing     OCCUPATIONAL THERAPY TREATMENT  Patient: Milly Morse (78 y.o. female)  Date: 2/5/2024  Primary Diagnosis: COPD (chronic obstructive pulmonary disease) (HCC) [J44.9]  Pulmonary fibrosis (HCC) [J84.10]  COPD exacerbation (HCC) [J44.1]  Acute on chronic respiratory failure with hypoxia (HCC) [J96.21]       Precautions: Bed Alarm                Chart, occupational therapy assessment, plan of care, and goals were reviewed.    ASSESSMENT  Patient continues to benefit from skilled OT services and is progressing towards goals. Pt received to OT services seated in chair, amendable to session. Received on 4L O2 NC (Spo2: 96%),  required frequent rest breaks and VCs for PLB during activity (Spo2: 82%), recovered to Spo2: 90-94%. Pt required overall SPV for all functional mobility this date for dynamic standing balance. Pt transferred to bathroom and completed all aspects of toileting in seated and grooming routine standing sink with SPV. Pt ambulated in hallway with PT (see PT note for gait details) and returned to seated in chair. Provided continued verbal education on energy conservation techniques and ADL adaptive strategies. Pt repositioned for comfort, call bell within reach, all needs met. OT continues to recommend  OT following discharge.      PLAN :  Patient continues to benefit from skilled intervention to address the above impairments.  Continue treatment per established plan of care to address goals.    Recommend with staff: OOB for meals 3x/day, toileting in bathroom    Recommend next OT session: grooming routine standing at sink    Recommendation for discharge: (in order for the patient to meet his/her long term goals): Therapy 2 days/week in the home    Other factors to consider for discharge: lives alone and concern for safely navigating or managing the home environment    IF patient discharges home will need the following DME: shower chair and continuing to assess with progress       SUBJECTIVE:   Patient stated “This oxygen thing is driving me crazy.”    OBJECTIVE DATA SUMMARY:   Cognitive/Behavioral Status:  Orientation  Overall Orientation Status: Within Normal Limits  Orientation Level: Oriented X4  Cognition  Overall Cognitive Status: WNL    Functional Mobility and Transfers for ADLs:  Bed Mobility:  Bed Mobility Training  Bed Mobility Training:  (Received in chair)     Transfers:   Transfer Training  Transfer Training: Yes  Sit to Stand: Supervision  Stand to Sit: Supervision  Toilet Transfer: Supervision           Balance:  Standing: Impaired  Balance  Sitting: Intact  Standing: Impaired  Standing - Static:

## 2024-02-05 NOTE — PLAN OF CARE
Problem: Physical Therapy - Adult  Goal: By Discharge: Performs mobility at highest level of function for planned discharge setting.  See evaluation for individualized goals.  Description: FUNCTIONAL STATUS PRIOR TO ADMISSION: Patient was independent and active without use of DME. Pt was driving and recently retired.    HOME SUPPORT PRIOR TO ADMISSION: The patient lived alone with no local support. States she is able to \"call a neighbor\" if needed.    Physical Therapy Goals  Initiated 2/2/2024  1.  Patient will perform sit to stand with independence within 7 day(s).  2.  Patient will transfer from bed to chair and chair to bed with independence using the least restrictive device within 7 day(s).  3.  Patient will ambulate with independence for 150-300 feet with self-imposed energy conservation/safety techniques as needed within 7 day(s).     Outcome: Progressing   PHYSICAL THERAPY TREATMENT    Patient: Milly Morse (78 y.o. female)  Date: 2/5/2024  Diagnosis: COPD (chronic obstructive pulmonary disease) ([facility]) [J44.9]  Pulmonary fibrosis (HCC) [J84.10]  COPD exacerbation ([facility]) [J44.1]  Acute on chronic respiratory failure with hypoxia ([facility]) [J96.21] COPD exacerbation (HCC)      Precautions: Bed Alarm                      ASSESSMENT:  Patient continues to benefit from skilled PT services and is progressing towards goals. Patient found seated in chair and agreeable to PT. Demonstrating improving activity tolerance with ambulation around unit; VCs provided for intermittent PLB and for initiation of standing rest breaks with spo2 desaturation. Spo2 desaturating as low as 82% on 4 LPM while ambulating, patient endorsing mild RANGEL. Spo2 recovers quickly with rest back to >90%  on 4 LPM. BP and HR stable. May benefit from rollator for energy conservation, will introduce to patient and trial at next session if she is agreeable to use. Anticipate HH PT at discharge.     Pulse oximetry assessment   96% at rest on 4  Prevention Strategies;Energy Conservation  Education Provided Comments: PLB/seated rest breaks as indicated; SpO2 monitoring at home; use of IS  Education Method: Demonstration;Verbal  Barriers to Learning: None  Education Outcome: Verbalized understanding;Demonstrated understanding;Continued education needed      Lesly Zamora PT, DPT  Minutes: 23

## 2024-02-05 NOTE — PROGRESS NOTES
Bedside and Verbal shift change report given to Margaret (oncoming nurse) by Kt (offgoing nurse). Report included the following information Nurse Handoff Report, Adult Overview, Intake/Output, MAR, and Recent Results.

## 2024-02-05 NOTE — PROGRESS NOTES
[facility] [facility]                                                                                          Hospitalist Progress Note  Callie Cuadra MD  Answering service: [phone] OR [phone] from in house phone        Date of Service:  2024  NAME:  Milly Morse  :  1945  MRN:  [id number]       Admission Summary:     Milly Morse is a 78 y.o. female with pmhx of pulmonary fibrosis, emphysema on 2lnc oxygen at home, GERD and hypothyroidism who presents with shortness of breath, chronic cough and hypoxia.  Patient was seen at pulmonologist office and oxygen saturation was noted to be 80%.  Patient instructed to come to the ED.  Patient denies fever, n/v/d but does attests to feeling poorly on and off since thanksgi.  Patient has been coughing non stop and has been treated with steroids and oral abx and was continuing to feel more malaise, shortness of breath and persistent cough.  Workup in ED showed wbc 12.6, Hg 14.7, plt 181, , K 3.5, BUN 12, Cr 0.78, glucose 121, , Trop 6.  Covid/flu negative.  CTA of chest was negative for PE and PNA but positive for extensive emphysema and hiatal hernia.  Patient admitted to hospital for further workup.      Interval history / Subjective:     Patient seen and examined at the bedside.  She stated she feels shaky, couldn't sleep last night with CPAP,  otherwise breathing improving, no chest pain,     Assessment & Plan:     Acute on Chronic Respiratory failure with hypoxia due to pulmonary fibrosis, emphysema and pulmonary edema  -home oxygen dependent 2 LNC  -on IV Solu-Medrol 40 mg IV q 8 hrs, Mucomyst neb, Pulmicort/Brovana, DuoNeb, Tessalon, Singulair, IV Lasix 20 mg x 1 on ,   -on Midflow 4 l/m, SpO2 95-98%, on CPAP q hs last night,   -ABG  on FiO2 15 pH was 7.43, pCO2 40.9, pO2 118 on   -covid/flu negative  -sputum culture normal georgina  -CTA negative for pna, PE, +hiatal hernia,

## 2024-02-05 NOTE — CARE COORDINATION
Transition of Care Plan:    RUR: 10%  Prior Level of Functioning: Lives alone, drives, Ind, O2 (pays out of pocket), no DME.   Disposition: Return home w existing O2. Therapy recommending HH for endurance.  If SNF or IPR: Date FOC offered: NA  Date FOC received:   Accepting facility:   Date authorization started with reference number:   Date authorization received and expires:   Follow up appointments: PCP/specialist  DME needed: 2/5: PT will trial rollator for energy conservation if patient agreeable. OT recommending a shower chair and she can purchase from her [employer] product catalog since it's not a covered item.  Transportation at discharge: Assess functional status at d/c.  IM/IMM Medicare/ letter given:   Is patient a [place] and connected with [facility]?    If yes, was [place] transfer form completed and [facility] notified?   Caregiver Contact: Daughter in law, Emelia Morse ([phone])   Discharge Caregiver contacted prior to discharge?   Care Conference needed?   Barriers to discharge:  Medical stability- hypoxic, IV steriods    Attempted to meet w patient to follow up on her current status and review BRENDA needs but she had a visitor.  CM completed a chart review and therapy is recommending HH for poor activity tolerance.  They also plan to trial a rollator tomorrow for energy conservation.  In IDRs Dr. Cuadra reports patient is on IV steroids.     LEONOR Peters CCM  Care Management

## 2024-02-05 NOTE — PROGRESS NOTES
Pulmonary, Critical Care, and Sleep Medicine~Progress Note    Name: Milly Morse MRN: [id number]   : 1945 Hospital: [facility]   Date: 2024 1:52 PM Admission: 2024     Impression Plan   Acute on chronic hypoxic respiratory failure  Acute exacerbation of COPD/IPF  History of significant acid reflux, Nissen fundoplication, Bravo clip.  On PPI currently  Previous echo showed mild PH  Hypothyroidism  History of allergies on allergy shots. Reduce steroids to p.o. at 40 mg daily.  We would probably end up holding this dose until follow-up with Dr. Chicas  Check procal; 0.05  acid reflux medicine to IV  Cpap for suspected SARAH  O2 titration above 89%; please continue to wean. Will continue to have probe on nose  PT/OT; sit up in chair as much as possible   Bronchodilators  Cough suppressants/mucolytic's  Sputum culture,normal     Daily Progression:    2/  Her oxygen level still drop with ambulation, usually into the mid 80s.  She does recover quickly.  O2 requirements are certainly down to 3 to 4 L.  Feels that CPAP is helping her but having some difficulty tolerating mask  She does feel quite jittery, possibly from steroids  Did not find much benefit with diuretics    2/2  O2 down to 7lpm   Noted chest film  Sats still drop with ambulation  ABG looks ok         ECHO:  Left Ventricle Normal left ventricular systolic function with a visually estimated EF of 60 - 65%. Left ventricle size is normal. Increased wall thickness. Findings consistent with mild concentric hypertrophy. Normal wall motion. Normal diastolic function.   Left Atrium Left atrium size is normal.   Right Ventricle Right ventricle size is normal. Normal systolic function.   Right Atrium Right atrium size is normal.   Aortic Valve Valve structure is normal. No regurgitation. No stenosis.   Mitral Valve Valve structure is normal. No regurgitation. No stenosis noted.   Tricuspid Valve Valve structure is normal. No  IntraVENous Q6H PRN    polyethylene glycol (GLYCOLAX) packet 17 g  17 g Oral Daily PRN    enoxaparin (LOVENOX) injection 40 mg  40 mg SubCUTAneous Daily    acetaminophen (TYLENOL) tablet 650 mg  650 mg Oral Q6H PRN    Or    acetaminophen (TYLENOL) suppository 650 mg  650 mg Rectal Q6H PRN    acetylcysteine (MUCOMYST) 20 % solution 600 mg  600 mg Inhalation BID RT    arformoterol 15 mcg-budesonide 0.25 mg neb solution   Nebulization BID RT    HYDROcodone-chlorpheniramine (TUSSIONEX) 10-8 MG/5ML oral suspension 5 mL  5 mL Oral Q12H PRN    gabapentin (NEURONTIN) capsule 300 mg  300 mg Oral TID       Labs:  ABG Invalid input(s): \"ABG\"     CBC Recent Labs     02/03/24  0034 02/04/24  0036 02/05/24  0555   WBC 9.8 7.0 7.9   HGB 14.5 14.0 13.8   HCT 43.4 41.1 41.5    126* 119*   .9* 100.0* 99.8*   MCH 33.7 34.1* 33.2          Metabolic  Panel Recent Labs     02/03/24  0034 02/04/24  0036 02/05/24  0555    140 142   K 3.5 3.8 4.5    105 109*   CO2 31 29 28   BUN 27* 29* 22*   ALT 28 19 24          Pertinent Labs                Oumar Arciniega PA-C  2/5/2024

## 2024-02-06 LAB
ALBUMIN SERPL-MCNC: 2.6 G/DL (ref 3.5–5)
ALBUMIN/GLOB SERPL: 1.1 (ref 1.1–2.2)
ALP SERPL-CCNC: 49 U/L (ref 45–117)
ALT SERPL-CCNC: 24 U/L (ref 12–78)
ANION GAP SERPL CALC-SCNC: 5 MMOL/L (ref 5–15)
AST SERPL-CCNC: 9 U/L (ref 15–37)
BILIRUB SERPL-MCNC: 0.4 MG/DL (ref 0.2–1)
BUN SERPL-MCNC: 22 MG/DL (ref 6–20)
BUN/CREAT SERPL: 27 (ref 12–20)
CALCIUM SERPL-MCNC: 8.4 MG/DL (ref 8.5–10.1)
CHLORIDE SERPL-SCNC: 108 MMOL/L (ref 97–108)
CO2 SERPL-SCNC: 28 MMOL/L (ref 21–32)
CREAT SERPL-MCNC: 0.82 MG/DL (ref 0.55–1.02)
ERYTHROCYTE [DISTWIDTH] IN BLOOD BY AUTOMATED COUNT: 13.2 % (ref 11.5–14.5)
GLOBULIN SER CALC-MCNC: 2.4 G/DL (ref 2–4)
GLUCOSE SERPL-MCNC: 119 MG/DL (ref 65–100)
HCT VFR BLD AUTO: 39.8 % (ref 35–47)
HGB BLD-MCNC: 13.3 G/DL (ref 11.5–16)
MCH RBC QN AUTO: 33.3 PG (ref 26–34)
MCHC RBC AUTO-ENTMCNC: 33.4 G/DL (ref 30–36.5)
MCV RBC AUTO: 99.7 FL (ref 80–99)
NRBC # BLD: 0 K/UL (ref 0–0.01)
NRBC BLD-RTO: 0 PER 100 WBC
PLATELET # BLD AUTO: 124 K/UL (ref 150–400)
PMV BLD AUTO: 12.6 FL (ref 8.9–12.9)
POTASSIUM SERPL-SCNC: 4.2 MMOL/L (ref 3.5–5.1)
PROT SERPL-MCNC: 5 G/DL (ref 6.4–8.2)
RBC # BLD AUTO: 3.99 M/UL (ref 3.8–5.2)
SODIUM SERPL-SCNC: 141 MMOL/L (ref 136–145)
WBC # BLD AUTO: 10.9 K/UL (ref 3.6–11)

## 2024-02-06 PROCEDURE — 97116 GAIT TRAINING THERAPY: CPT

## 2024-02-06 PROCEDURE — 97530 THERAPEUTIC ACTIVITIES: CPT

## 2024-02-06 PROCEDURE — 85027 COMPLETE CBC AUTOMATED: CPT

## 2024-02-06 PROCEDURE — 6370000000 HC RX 637 (ALT 250 FOR IP): Performed by: NURSE PRACTITIONER

## 2024-02-06 PROCEDURE — 6370000000 HC RX 637 (ALT 250 FOR IP): Performed by: HOSPITALIST

## 2024-02-06 PROCEDURE — 6370000000 HC RX 637 (ALT 250 FOR IP): Performed by: INTERNAL MEDICINE

## 2024-02-06 PROCEDURE — 94760 N-INVAS EAR/PLS OXIMETRY 1: CPT

## 2024-02-06 PROCEDURE — 6360000002 HC RX W HCPCS: Performed by: NURSE PRACTITIONER

## 2024-02-06 PROCEDURE — 2580000003 HC RX 258: Performed by: NURSE PRACTITIONER

## 2024-02-06 PROCEDURE — 80053 COMPREHEN METABOLIC PANEL: CPT

## 2024-02-06 PROCEDURE — 94660 CPAP INITIATION&MGMT: CPT

## 2024-02-06 PROCEDURE — 2700000000 HC OXYGEN THERAPY PER DAY

## 2024-02-06 PROCEDURE — 2060000000 HC ICU INTERMEDIATE R&B

## 2024-02-06 PROCEDURE — 94640 AIRWAY INHALATION TREATMENT: CPT

## 2024-02-06 PROCEDURE — 6370000000 HC RX 637 (ALT 250 FOR IP): Performed by: PHYSICIAN ASSISTANT

## 2024-02-06 PROCEDURE — 6370000000 HC RX 637 (ALT 250 FOR IP)

## 2024-02-06 PROCEDURE — 36415 COLL VENOUS BLD VENIPUNCTURE: CPT

## 2024-02-06 RX ADMIN — ARFORMOTEROL TARTRATE: 15 SOLUTION RESPIRATORY (INHALATION) at 19:30

## 2024-02-06 RX ADMIN — BENZONATATE 200 MG: 100 CAPSULE ORAL at 21:15

## 2024-02-06 RX ADMIN — PANTOPRAZOLE SODIUM 40 MG: 40 TABLET, DELAYED RELEASE ORAL at 15:51

## 2024-02-06 RX ADMIN — SODIUM CHLORIDE, PRESERVATIVE FREE 10 ML: 5 INJECTION INTRAVENOUS at 21:16

## 2024-02-06 RX ADMIN — PREDNISONE 40 MG: 20 TABLET ORAL at 09:53

## 2024-02-06 RX ADMIN — GABAPENTIN 300 MG: 100 CAPSULE ORAL at 15:22

## 2024-02-06 RX ADMIN — ARFORMOTEROL TARTRATE: 15 SOLUTION RESPIRATORY (INHALATION) at 08:28

## 2024-02-06 RX ADMIN — GABAPENTIN 300 MG: 100 CAPSULE ORAL at 09:54

## 2024-02-06 RX ADMIN — SODIUM CHLORIDE, PRESERVATIVE FREE 10 ML: 5 INJECTION INTRAVENOUS at 09:55

## 2024-02-06 RX ADMIN — BENZONATATE 200 MG: 100 CAPSULE ORAL at 15:22

## 2024-02-06 RX ADMIN — GABAPENTIN 300 MG: 100 CAPSULE ORAL at 21:15

## 2024-02-06 RX ADMIN — MONTELUKAST SODIUM 10 MG: 10 TABLET ORAL at 09:55

## 2024-02-06 RX ADMIN — BENZONATATE 200 MG: 100 CAPSULE ORAL at 09:53

## 2024-02-06 RX ADMIN — ENOXAPARIN SODIUM 40 MG: 100 INJECTION SUBCUTANEOUS at 09:53

## 2024-02-06 RX ADMIN — PANTOPRAZOLE SODIUM 40 MG: 40 TABLET, DELAYED RELEASE ORAL at 06:54

## 2024-02-06 RX ADMIN — LEVOTHYROXINE SODIUM 88 MCG: 0.09 TABLET ORAL at 06:54

## 2024-02-06 NOTE — CARE COORDINATION
RUR: 10%  Prior Level of Functioning: Lives alone, drives, Ind, O2 (pays out of pocket), no DME.   Disposition: Return home w existing O2. Therapy recommending HH for endurance.  If SNF or IPR: Date FOC offered: NA  Date FOC received:   Accepting facility:   Date authorization started with reference number:   Date authorization received and expires:   Follow up appointments: PCP/specialist  DME needed: 2/5: PT will trial rollator for energy conservation if patient agreeable. OT recommending a shower chair and she can purchase from her [employer] product catalog since it's not a covered item.  Transportation at discharge: Assess functional status at d/c.  IM/IMM Medicare/ letter given:   Is patient a  and connected with VA?               If yes, was [place] transfer form completed and VA notified?   Caregiver Contact: Daughter in law, Emelia Morse ([phone])   Discharge Caregiver contacted prior to discharge?   Care Conference needed?   Barriers to discharge:  Medical stability- hypoxic, IV steroids  Referral sent to [facility] for home health. Awaiting their response. Likely discharge home tomorrow. Family will transport.

## 2024-02-06 NOTE — PROGRESS NOTES
PCCM:    VSS, supplemental o2 at 4lpm    WBC 10.9    Plan:    COPD  ILD   Continue steroids at 40mg daily until follow up with Dr Chicas   O2 titration above 90%, will need continuous O2 at discharge    Continue working with PT/OT   Bronchodilators     Oumar Arciniega PA-C

## 2024-02-06 NOTE — PLAN OF CARE
Problem: Physical Therapy - Adult  Goal: By Discharge: Performs mobility at highest level of function for planned discharge setting.  See evaluation for individualized goals.  Description: FUNCTIONAL STATUS PRIOR TO ADMISSION: Patient was independent and active without use of DME. Pt was driving and recently retired.    HOME SUPPORT PRIOR TO ADMISSION: The patient lived alone with no local support. States she is able to \"call a neighbor\" if needed.    Physical Therapy Goals  Initiated 2/2/2024  1.  Patient will perform sit to stand with independence within 7 day(s).  2.  Patient will transfer from bed to chair and chair to bed with independence using the least restrictive device within 7 day(s).  3.  Patient will ambulate with independence for 150-300 feet with self-imposed energy conservation/safety techniques as needed within 7 day(s).     Outcome: Progressing   PHYSICAL THERAPY TREATMENT    Patient: Milly Morse (78 y.o. female)  Date: 2/6/2024  Diagnosis: COPD (chronic obstructive pulmonary disease) ([facility]) [J44.9]  Pulmonary fibrosis ([facility]) [J84.10]  COPD exacerbation ([facility]) [J44.1]  Acute on chronic respiratory failure with hypoxia ([facility]) [J96.21] COPD exacerbation (HCC)      Precautions: Bed Alarm                      ASSESSMENT:  Patient continues to benefit from skilled PT services and is progressing towards goals. Patient found seated in chair and agreeable to PT. Receiving 4 LPM O2 via NC. Improving activity tolerance demonstrated with patient ambulating increased distances with less report of RANGEL, however continued spo2 desaturation to low 80s observed. VCs provided for standing rest with spo2 partially recovering; full spo2 recovery >92% occurring quickly once patient seated at rest. Educated patient on benefits of rollator for improved community access to use as needed for seated rest; patient demonstrating good understanding. Rollator recommended at discharge, order placed. HH recommended at D/C.  level acceptable to the patient    Activity Tolerance:   Good, requires frequent rest breaks, observed shortness of breath on exertion, and desaturates with activity and requires oxygen    After treatment:   Patient left in no apparent distress sitting up in chair, Call bell within reach, and Bed/ chair alarm activated      COMMUNICATION/EDUCATION:   The patient's plan of care was discussed with: occupational therapist and registered nurse    Patient Education  Education Given To: Patient  Education Provided: Plan of Care;Transfer Training;Fall Prevention Strategies;Energy Conservation  Education Provided Comments: PLB/seated rest breaks as indicated; SpO2 monitoring at home; use of IS  Education Method: Demonstration;Verbal  Barriers to Learning: None  Education Outcome: Verbalized understanding;Demonstrated understanding      Lesly Zamora PT, DPT  Minutes: 27

## 2024-02-06 NOTE — PROGRESS NOTES
[facility]                                                                                          Hospitalist Progress Note  Callie Cuadra MD  Answering service: [phone] OR [phone] from in house phone        Date of Service:  2024  NAME:  Milly Morse  :  1945  MRN:  [id number]       Admission Summary:     Milly Morse is a 78 y.o. female with pmhx of pulmonary fibrosis, emphysema on 2lnc oxygen at home, GERD and hypothyroidism who presents with shortness of breath, chronic cough and hypoxia.  Patient was seen at pulmonologist office and oxygen saturation was noted to be 80%.  Patient instructed to come to the ED.  Patient denies fever, n/v/d but does attests to feeling poorly on and off since thanksgi.  Patient has been coughing non stop and has been treated with steroids and oral abx and was continuing to feel more malaise, shortness of breath and persistent cough.  Workup in ED showed wbc 12.6, Hg 14.7, plt 181, , K 3.5, BUN 12, Cr 0.78, glucose 121, , Trop 6.  Covid/flu negative.  CTA of chest was negative for PE and PNA but positive for extensive emphysema and hiatal hernia.  Patient admitted to hospital for further workup.      Interval history / Subjective:     Patient seen and examined at the bedside.  She stated she feels shaky, couldn't sleep last night with CPAP,  otherwise breathing improving, no chest pain,     Assessment & Plan:     Acute on Chronic Respiratory failure with hypoxia due to pulmonary fibrosis, emphysema and pulmonary edema  -home oxygen dependent 2 LNC  -switched IV Solu-Medrol 40 mg IV q 8 hrs to prednisone 40 mg daily on ,  on Mucomyst neb, Pulmicort/Brovana, DuoNeb, Tessalon, Singulair, IV Lasix 20 mg x 1 on ,   -on Midflow 4 l/m, SpO2 95-98%, on CPAP q hs , monitor pulse ox wean down oxygen to SpO2 > 89%  -ABG  on FiO2 15 pH was 7.43, pCO2 40.9, pO2 118 on   -covid/flu  Systems:   Pertinent items are noted in HPI.       Vital Signs:    Last 24hrs VS reviewed since prior progress note. Most recent are:  Vitals:    02/06/24 0759   BP:    Pulse: 57   Resp:    Temp:    SpO2:        No intake or output data in the 24 hours ending 02/06/24 0809       Physical Examination:     I had a face to face encounter with this patient and independently examined them on 2/6/2024 as outlined below:          General : alert x 3, awake, no acute distress,   HEENT: PEERL, EOMI, moist mucus membrane, TM clear  Neck: supple, no JVD, no meningeal signs  Chest: Decrease bronchial breath sound bilaterally and right lower crepitation to auscultation, distant breath sounds; diminished breath sounds at right base post; no wheezing, bibasilar rales;  CVS: S1 S2 heard, RRR  Abd: soft/ non tender, non distended, BS physiological,   Ext: no clubbing, no cyanosis, no edema  Neuro/Psych: pleasant mood and affect, CN 2-12 grossly intact, no focal neuro deficits, speeach clear and fluent;  Skin: warm     Data Review:    Review and/or order of clinical lab test  Review and/or order of tests in the radiology section of CPT  Review and/or order of tests in the medicine section of CPT      I have personally and independently reviewed all pertinent labs, diagnostic studies, imaging, and have provided independent interpretation of the same.     Labs:     Recent Labs     02/05/24  0555 02/06/24  0443   WBC 7.9 10.9   HGB 13.8 13.3   HCT 41.5 39.8   * 124*       Recent Labs     02/04/24  0036 02/05/24  0555 02/06/24  0443    142 141   K 3.8 4.5 4.2    109* 108   CO2 29 28 28   BUN 29* 22* 22*       Recent Labs     02/04/24  0036 02/05/24  0555 02/06/24  0443   ALT 19 24 24   GLOB 2.7 2.7 2.4       No results for input(s): \"INR\", \"APTT\" in the last 72 hours.    Invalid input(s): \"PTP\"   No results for input(s): \"TIBC\", \"FERR\" in the last 72 hours.    Invalid input(s): \"FE\", \"PSAT\"   No results found for:

## 2024-02-06 NOTE — PLAN OF CARE
Problem: Occupational Therapy - Adult  Goal: By Discharge: Performs self-care activities at highest level of function for planned discharge setting.  See evaluation for individualized goals.  Description: FUNCTIONAL STATUS PRIOR TO ADMISSION:  pt lives alone, recently retired, reports independent with ADLs, not using AD. She has nocturnal O2  Receives Help From: Neighbor, ADL Assistance: Independent, Homemaking Assistance: Independent, Ambulation Assistance: Independent, Transfer Assistance: Independent, Active : Yes     HOME SUPPORT: Patient lived alone with no local support. Pt states she can have \"neighbor\" assist if needed.    Occupational Therapy Goals:  Initiated 2/2/2024  1.  Patient will perform ADLs in bathroom with Modified [place] within 7 day(s).  2.  Patient will perform lower body dressing with Modified [place] within 7 day(s).  3.  Patient will perform seated sponge bathing with Modified [place] within 7 day(s).  4.  Patient will perform toilet transfers in bathroom with Modified [place]  within 7 day(s).  5.  Patient will perform all aspects of toileting with Modified [place] within 7 day(s).  6.  Patient will utilize energy conservation techniques during functional activities with verbal cues within 7 day(s).   Outcome: Progressing     OCCUPATIONAL THERAPY TREATMENT  Patient: Milly Morse (78 y.o. female)  Date: 2/6/2024  Primary Diagnosis: COPD (chronic obstructive pulmonary disease) (HCC) [J44.9]  Pulmonary fibrosis (HCC) [J84.10]  COPD exacerbation (HCC) [J44.1]  Acute on chronic respiratory failure with hypoxia (HCC) [J96.21]       Precautions: Bed Alarm                Chart, occupational therapy assessment, plan of care, and goals were reviewed.    ASSESSMENT  Patient continues to benefit from skilled OT services and is progressing towards goals. Pt cleared for therapy by nursing and received semi-fowlers in bed agreeable to therapy. Pt on 4L O2 with O2 sats  89%-90% at rest with conversation. Pt completed bed mobility and functional mobility in room with supervision. O2 sats during activity 86%-90%. Pt practicing PLB and in recliner at end of session with sats at 90-92%. Set up for breakfast and all needs met. Continue to recommend HHOT at discharge.          PLAN :  Patient continues to benefit from skilled intervention to address the above impairments.  Continue treatment per established plan of care to address goals.    Recommend with staff: OOB to chair and bathroom with Ax1, Pt participation in self care     Recommend next OT session: activity tolerance     Recommendation for discharge: (in order for the patient to meet his/her long term goals): Therapy 2 days/week in the home    Other factors to consider for discharge: lives alone    IF patient discharges home will need the following DME: patient owns DME required for discharge       SUBJECTIVE:   Patient stated “I'm pretty pleased with myself.”    OBJECTIVE DATA SUMMARY:   Cognitive/Behavioral Status:  Orientation  Overall Orientation Status: Within Normal Limits  Orientation Level: Oriented X4  Cognition  Overall Cognitive Status: WNL    Functional Mobility and Transfers for ADLs:  Bed Mobility:  Bed Mobility Training  Bed Mobility Training: Yes  Supine to Sit: Modified independent  Sit to Supine: Modified independent     Transfers:   Transfer Training  Sit to Stand: Supervision  Stand to Sit: Supervision  Bed to Chair: Supervision         Balance:     Balance  Sitting: Intact  Standing - Static: Good  Standing - Dynamic: Good;Unsupported    Pain Rating:  None     Pain Intervention(s):   pain is at a level acceptable to the patient      Activity Tolerance:   Fair  and desaturates with activity and requires oxygen  Please refer to the flowsheet for vital signs taken during this treatment.    After treatment:   Patient left in no apparent distress sitting up in chair, Call bell within reach, and Bed/ chair alarm

## 2024-02-06 NOTE — PROGRESS NOTES
Comprehensive Nutrition Assessment    Type and Reason for Visit: Initial (LOS)    Nutrition Recommendations/Plan:   Continue with Regular diet       Malnutrition Assessment:  Malnutrition Status:  No malnutrition (02/06/24 1544)         Nutrition Assessment:    77 yo female admitted for COPD.  PMHx: pulmonary fibrosis, emphysema on 2L NC O2 at home, ,GERD, hypothyroid.      Seeing pt for LOS.  Currently on 4L NC O2.  Spoke with pt at bedside.  She reports very good appetite since admission because she is taking Prednisone and feels like her hunger has increased.  She is eating all the food we send or sometimes people bring her food from outside that she eats. She reports normally having a good appetite at home.  For the last 2 weeks she wasn't feeling as well so she was eating a smaller dinner but denies seeing any change in her weight associated with this.  Reports stable weight PTA.     Labs:  (no hx of DM, likely from steroid)      Nutritionally Significant Medications:  Synthroid, Protonix, Prednisone      Estimated Daily Nutrient Needs:  Energy Requirements Based On: Formula  Weight Used for Energy Requirements: Current  Energy (kcal/day): 1500 (MSJ x AF 1.3)  Weight Used for Protein Requirements: Current  Protein (g/day): 72 (1 gm/kg)  Method Used for Fluid Requirements: 1 ml/kcal  Fluid (ml/day): 1500    Nutrition Related Findings:   Edema: None                    Last BM: 02/05/24    Wounds:   Wound Type: None      Current Nutrition Therapies:  Diet: Regular  Supplements: none  Meal Intake:   Patient Vitals for the past 168 hrs:   PO Meals Eaten (%)   02/04/24 0900 76 - 100%   02/03/24 0820 76 - 100%   02/02/24 1000 76 - 100%   01/31/24 1400 1 - 25%   01/31/24 1000 51 - 75%     Supplement Intake:  No data found.  Nutrition Support: none      Anthropometric Measures:  Height: 154.9 cm (5' 1\")  Ideal Body Weight (IBW): 105 lbs (48 kg)       Current Body Weight: 72 kg (158 lb 11.7 oz), 151.2 % IBW. Weight

## 2024-02-07 ENCOUNTER — HOME HEALTH ADMISSION (OUTPATIENT)
Dept: HOME HEALTH SERVICES | Facility: HOME HEALTH | Age: 79
End: 2024-02-07
Payer: MEDICARE

## 2024-02-07 VITALS
OXYGEN SATURATION: 88 % | TEMPERATURE: 98 F | RESPIRATION RATE: 20 BRPM | WEIGHT: 158.73 LBS | DIASTOLIC BLOOD PRESSURE: 62 MMHG | SYSTOLIC BLOOD PRESSURE: 124 MMHG | BODY MASS INDEX: 29.97 KG/M2 | HEIGHT: 61 IN | HEART RATE: 85 BPM

## 2024-02-07 LAB
ALBUMIN SERPL-MCNC: 2.6 G/DL (ref 3.5–5)
ALBUMIN/GLOB SERPL: 1.2 (ref 1.1–2.2)
ALP SERPL-CCNC: 50 U/L (ref 45–117)
ALT SERPL-CCNC: 20 U/L (ref 12–78)
ANION GAP SERPL CALC-SCNC: 2 MMOL/L (ref 5–15)
AST SERPL-CCNC: 10 U/L (ref 15–37)
BILIRUB SERPL-MCNC: 0.3 MG/DL (ref 0.2–1)
BUN SERPL-MCNC: 23 MG/DL (ref 6–20)
BUN/CREAT SERPL: 31 (ref 12–20)
CALCIUM SERPL-MCNC: 8.6 MG/DL (ref 8.5–10.1)
CHLORIDE SERPL-SCNC: 112 MMOL/L (ref 97–108)
CO2 SERPL-SCNC: 29 MMOL/L (ref 21–32)
CREAT SERPL-MCNC: 0.75 MG/DL (ref 0.55–1.02)
ERYTHROCYTE [DISTWIDTH] IN BLOOD BY AUTOMATED COUNT: 13.6 % (ref 11.5–14.5)
GLOBULIN SER CALC-MCNC: 2.2 G/DL (ref 2–4)
GLUCOSE SERPL-MCNC: 88 MG/DL (ref 65–100)
HCT VFR BLD AUTO: 39.6 % (ref 35–47)
HGB BLD-MCNC: 13.2 G/DL (ref 11.5–16)
MCH RBC QN AUTO: 33.7 PG (ref 26–34)
MCHC RBC AUTO-ENTMCNC: 33.3 G/DL (ref 30–36.5)
MCV RBC AUTO: 101 FL (ref 80–99)
NRBC # BLD: 0 K/UL (ref 0–0.01)
NRBC BLD-RTO: 0 PER 100 WBC
PLATELET # BLD AUTO: 101 K/UL (ref 150–400)
PMV BLD AUTO: 12.9 FL (ref 8.9–12.9)
POTASSIUM SERPL-SCNC: 4.2 MMOL/L (ref 3.5–5.1)
PROT SERPL-MCNC: 4.8 G/DL (ref 6.4–8.2)
RBC # BLD AUTO: 3.92 M/UL (ref 3.8–5.2)
SODIUM SERPL-SCNC: 143 MMOL/L (ref 136–145)
WBC # BLD AUTO: 8.4 K/UL (ref 3.6–11)

## 2024-02-07 PROCEDURE — 80053 COMPREHEN METABOLIC PANEL: CPT

## 2024-02-07 PROCEDURE — 94760 N-INVAS EAR/PLS OXIMETRY 1: CPT

## 2024-02-07 PROCEDURE — 6360000002 HC RX W HCPCS: Performed by: NURSE PRACTITIONER

## 2024-02-07 PROCEDURE — 6370000000 HC RX 637 (ALT 250 FOR IP)

## 2024-02-07 PROCEDURE — 85027 COMPLETE CBC AUTOMATED: CPT

## 2024-02-07 PROCEDURE — 94761 N-INVAS EAR/PLS OXIMETRY MLT: CPT

## 2024-02-07 PROCEDURE — 6370000000 HC RX 637 (ALT 250 FOR IP): Performed by: HOSPITALIST

## 2024-02-07 PROCEDURE — 6370000000 HC RX 637 (ALT 250 FOR IP): Performed by: PHYSICIAN ASSISTANT

## 2024-02-07 PROCEDURE — 36415 COLL VENOUS BLD VENIPUNCTURE: CPT

## 2024-02-07 PROCEDURE — 6370000000 HC RX 637 (ALT 250 FOR IP): Performed by: INTERNAL MEDICINE

## 2024-02-07 PROCEDURE — 94640 AIRWAY INHALATION TREATMENT: CPT

## 2024-02-07 PROCEDURE — 94660 CPAP INITIATION&MGMT: CPT

## 2024-02-07 PROCEDURE — 6370000000 HC RX 637 (ALT 250 FOR IP): Performed by: NURSE PRACTITIONER

## 2024-02-07 PROCEDURE — 2700000000 HC OXYGEN THERAPY PER DAY

## 2024-02-07 RX ORDER — PREDNISONE 20 MG/1
40 TABLET ORAL DAILY
Qty: 60 TABLET | Refills: 0 | Status: SHIPPED | OUTPATIENT
Start: 2024-02-08 | End: 2024-03-09

## 2024-02-07 RX ORDER — GUAIFENESIN 600 MG/1
600 TABLET, EXTENDED RELEASE ORAL 2 TIMES DAILY
Qty: 30 TABLET | Refills: 0 | Status: SHIPPED | OUTPATIENT
Start: 2024-02-07 | End: 2024-02-22

## 2024-02-07 RX ADMIN — PREDNISONE 40 MG: 20 TABLET ORAL at 08:22

## 2024-02-07 RX ADMIN — ACETYLCYSTEINE 600 MG: 200 INHALANT RESPIRATORY (INHALATION) at 07:44

## 2024-02-07 RX ADMIN — LEVOTHYROXINE SODIUM 88 MCG: 0.09 TABLET ORAL at 07:07

## 2024-02-07 RX ADMIN — MONTELUKAST SODIUM 10 MG: 10 TABLET ORAL at 08:23

## 2024-02-07 RX ADMIN — PANTOPRAZOLE SODIUM 40 MG: 40 TABLET, DELAYED RELEASE ORAL at 07:07

## 2024-02-07 RX ADMIN — GABAPENTIN 300 MG: 100 CAPSULE ORAL at 08:23

## 2024-02-07 RX ADMIN — BENZONATATE 200 MG: 100 CAPSULE ORAL at 08:23

## 2024-02-07 RX ADMIN — ARFORMOTEROL TARTRATE: 15 SOLUTION RESPIRATORY (INHALATION) at 07:44

## 2024-02-07 RX ADMIN — IPRATROPIUM BROMIDE AND ALBUTEROL SULFATE 1 DOSE: 2.5; .5 SOLUTION RESPIRATORY (INHALATION) at 07:45

## 2024-02-07 RX ADMIN — ENOXAPARIN SODIUM 40 MG: 100 INJECTION SUBCUTANEOUS at 08:23

## 2024-02-07 NOTE — CARE COORDINATION
RUR: 10%  Prior Level of Functioning: Lives alone, drives, Ind, O2 (pays out of pocket), no DME.   Disposition: Return home w existing O2. Therapy recommending HH for endurance.  If SNF or IPR: Date FOC offered: NA  Date FOC received:   Accepting facility:   Date authorization started with reference number:   Date authorization received and expires:   Follow up appointments: PCP/specialist  DME needed: 2/5: PT will trial rollator for energy conservation if patient agreeable. OT recommending a shower chair and she can purchase from her [employer] product catalog since it's not a covered item.  Transportation at discharge: Assess functional status at d/c.  IM/IMM Medicare/ letter given:   Is patient a  and connected with VA?               If yes, was [place] transfer form completed and VA notified?   Caregiver Contact: Daughter in law, Emelia Morse ([phone])   Discharge Caregiver contacted prior to discharge?   Care Conference needed?   Barriers to discharge:  Medical stability- hypoxic, IV steroids  Referral sent to [facility] for home health. Awaiting their response. Likely discharge home tomorrow. Family will transport.     [facility] has accepted for home health services. Contact added to the AVS. Discharging home today and family will provide transport. CM will order rollator and have delivered to her home. She is agreeable to this disposition.  Medicare pt has received, reviewed, and signed 2nd IM letter informing them of their right to appeal the discharge.  Signed copy has been placed on pt bedside chart.

## 2024-02-07 NOTE — DISCHARGE INSTRUCTIONS
Discharge Instructions       PATIENT ID: Milly Morse  MRN: [id number]   YOB: 1945    DATE OF ADMISSION: 1/29/2024   DATE OF DISCHARGE: 2/7/2024    PRIMARY CARE PROVIDER: Babak Roque     ATTENDING PHYSICIAN: Callie Cuadra MD   DISCHARGING PROVIDER: Callie Cuadra MD    To contact this individual call [phone] and ask the  to page.   If unavailable ask to be transferred the Adult Hospitalist Department.    DISCHARGE DIAGNOSES   Acute on Chronic Respiratory failure with hypoxia due to pulmonary fibrosis, emphysema and pulmonary edema  GERD  Hypothyroidism  Sinus bradycardia:  Thrombocytopen    CONSULTATIONS:   IP CONSULT TO HOSPITALIST  IP CONSULT TO PULMONOLOGY  IP CONSULT TO CARDIOLOGY  IP CONSULT TO CASE MANAGEMENT  IP CONSULT TO CASE MANAGEMENT  PROCEDURES/SURGERIES: * No surgery found *    PENDING TEST RESULTS:   At the time of discharge the following test results are still pending: None    FOLLOW UP APPOINTMENTS:   Babak Roque MD in one week   Follow up with Dr Chicas in one week   Outpatient sleep study    ADDITIONAL CARE RECOMMENDATIONS:      DIET: regular diet    ACTIVITY: activity as tolerated    WOUND CARE: None     EQUIPMENT needed: None       DISCHARGE MEDICATIONS:   See Medication Reconciliation Form    It is important that you take the medication exactly as they are prescribed.   Keep your medication in the bottles provided by the pharmacist and keep a list of the medication names, dosages, and times to be taken in your wallet.   Do not take other medications without consulting your doctor.       NOTIFY YOUR PHYSICIAN FOR ANY OF THE FOLLOWING:   Fever over 101 degrees for 24 hours.   Chest pain, shortness of breath, fever, chills, nausea, vomiting, diarrhea, change in mentation, falling, weakness, bleeding. Severe pain or pain not relieved by medications.  Or, any other signs or symptoms that you may have questions about.      DISPOSITION:   X

## 2024-02-07 NOTE — PROGRESS NOTES
Pulmonary, Critical Care, and Sleep Medicine~Progress Note    Name: Milly Morse MRN: [id number]   : 1945 Hospital: [facility]   Date: 2024 11:02 AM Admission: 2024     Impression Plan   Acute on chronic hypoxic respiratory failure  Acute exacerbation of COPD/IPF  History of significant acid reflux, Nissen fundoplication, Bravo clip.  On PPI currently  Previous echo showed mild PH  Hypothyroidism  History of allergies on allergy shots. Reduce steroids to p.o. at 40 mg daily.  We would probably end up holding this dose until follow-up with Dr. Chicas  procal; 0.05  Has an appt with Dr Chicas next Monday   Cpap for suspected SARAH while in the hospital. She has a sleep appt scheduled already   O2 titration above 89%; please continue to wean as able   PT/OT; sit up in chair as much as possible   Bronchodilators  Cough suppressants/mucolytic's  Sputum culture,normal  Going home today      Daily Progression:    /  Doing much better today  Cough not as extreme, but still present     2/5  Her oxygen level still drop with ambulation, usually into the mid 80s.  She does recover quickly.  O2 requirements are certainly down to 3 to 4 L.  Feels that CPAP is helping her but having some difficulty tolerating mask  She does feel quite jittery, possibly from steroids  Did not find much benefit with diuretics    2/2  O2 down to 7lpm   Noted chest film  Sats still drop with ambulation  ABG looks ok         ECHO:  Left Ventricle Normal left ventricular systolic function with a visually estimated EF of 60 - 65%. Left ventricle size is normal. Increased wall thickness. Findings consistent with mild concentric hypertrophy. Normal wall motion. Normal diastolic function.   Left Atrium Left atrium size is normal.   Right Ventricle Right ventricle size is normal. Normal systolic function.   Right Atrium Right atrium size is normal.   Aortic Valve Valve structure is normal. No regurgitation. No stenosis.  N/A 4/22/2021    COLONOSCOPY   :- performed by Alfredo Rey MD at [facility] ENDOSCOPY    COLONOSCOPY N/A 12/3/2018    COLONOSCOPY performed by Anita Yañez MD at [facility] MAIN OR    OTHER SURGICAL HISTORY      attempted bronchoscopy and one completed    TONSILLECTOMY      TUBAL LIGATION        Prior to Admission medications    Medication Sig Start Date End Date Taking? Authorizing Provider   predniSONE (DELTASONE) 20 MG tablet Take 2 tablets by mouth daily 2/8/24 3/9/24 Yes Callie Cuadra MD   guaiFENesin (MUCINEX) 600 MG extended release tablet Take 1 tablet by mouth 2 times daily for 15 days 2/7/24 2/22/24 Yes Callie Cuadra MD   ipratropium (ATROVENT) 0.02 % nebulizer solution Take 2.5 mLs by nebulization every 8 hours as needed for Wheezing 2/7/24 2/17/24 Yes Callie Cuadra MD   esomeprazole Magnesium (NEXIUM) 40 MG PACK Take 1 packet by mouth daily   Yes Raquel Smart MD   gabapentin (NEURONTIN) 300 MG capsule Take 1 capsule by mouth 3 times daily. Max Daily Amount: 900 mg   Yes Raquel Smart MD   fluticasone (ARNUITY ELLIPTA) 200 MCG/ACT AEPB Inhale 1 puff into the lungs daily   Yes Raquel Smart MD   Chromium 200 MCG CAPS Take 1 capsule by mouth daily   Yes Raquel Smart MD   Green Tea, Beatriz sinensis, (GREEN TEA EXTRACT PO) Take 1,000 mg by mouth daily    Automatic Reconciliation, Ar   Multiple Vitamin (MULTIVITAMIN PO) Take by mouth    Automatic Reconciliation, Ar   acetylcysteine 600 MG CAPS Take 600 mg by mouth daily    Automatic Reconciliation, Ar   Cyanocobalamin 1000 MCG SUBL Take 1,000 mcg by mouth daily    Automatic Reconciliation, Ar   levothyroxine (SYNTHROID) 88 MCG tablet Take 1 tablet by mouth Daily Takes daily except Sundays    Automatic Reconciliation, Ar   montelukast (SINGULAIR) 10 MG tablet Take 1 tablet by mouth daily    Automatic Reconciliation, Ar   tiotropium (SPIRIVA RESPIMAT) 2.5 MCG/ACT AERS inhaler Inhale 2 puffs into the lungs

## 2024-02-07 NOTE — DISCHARGE SUMMARY
Discharge Summary       PATIENT ID: Milly Morse  MRN: [id number]   YOB: 1945    DATE OF ADMISSION: 1/29/2024 12:22 PM    DATE OF DISCHARGE: 2/7/2024   PRIMARY CARE PROVIDER: Babak Roque MD     ATTENDING PHYSICIAN: Callie Cuadra MD  DISCHARGING PROVIDER: Callie Cuadra MD    To contact this individual call [phone] and ask the  to page.  If unavailable ask to be transferred the Adult Hospitalist Department.    CONSULTATIONS:   IP CONSULT TO HOSPITALIST  IP CONSULT TO PULMONOLOGY  IP CONSULT TO CARDIOLOGY  IP CONSULT TO CASE MANAGEMENT  IP CONSULT TO CASE MANAGEMENT    PROCEDURES/SURGERIES: * No surgery found *    ADMITTING DIAGNOSES & HOSPITAL COURSE:     Milly Morse is a 78 y.o. female with pmhx of pulmonary fibrosis, emphysema on 2lnc oxygen at home, GERD and hypothyroidism who presents with shortness of breath, chronic cough and hypoxia.  Patient was seen at pulmonologist office and oxygen saturation was noted to be 80%.  Patient instructed to come to the ED.  Patient denies fever, n/v/d but does attests to feeling poorly on and off since thanksgiving.  Patient has been coughing non stop and has been treated with steroids and oral abx and was continuing to feel more malaise, shortness of breath and persistent cough.  Workup in ED showed wbc 12.6, Hg 14.7, plt 181, , K 3.5, BUN 12, Cr 0.78, glucose 121, , Trop 6.  Covid/flu negative.  CTA of chest was negative for PE and PNA but positive for extensive emphysema and hiatal hernia.  Patient admitted to hospital for further workup.      Acute on Chronic Respiratory failure with hypoxia due to pulmonary fibrosis, emphysema and pulmonary edema  -home oxygen dependent 2 LNC  -switched IV Solu-Medrol 40 mg IV q 8 hrs to prednisone 40 mg daily on 2/6,  on Mucomyst neb, Pulmicort/Brovana, DuoNeb, Tessalon, Singulair, IV Lasix 20 mg x 1 on 2/1,   -Midflow weaning down to 3  l/m, SpO2 91-96%, on CPAP q hs ,

## 2024-02-07 NOTE — PLAN OF CARE
Problem: Safety - Adult  Goal: Free from fall injury  Outcome: Progressing  Flowsheets (Taken 2/7/2024 0129)  Free From Fall Injury:   Instruct family/caregiver on patient safety   Based on caregiver fall risk screen, instruct family/caregiver to ask for assistance with transferring infant if caregiver noted to have fall risk factors

## 2024-02-08 ENCOUNTER — HOME CARE VISIT (OUTPATIENT)
Facility: HOME HEALTH | Age: 79
End: 2024-02-08

## 2024-02-08 VITALS
DIASTOLIC BLOOD PRESSURE: 80 MMHG | HEART RATE: 73 BPM | BODY MASS INDEX: 31.6 KG/M2 | WEIGHT: 167.25 LBS | OXYGEN SATURATION: 94 % | SYSTOLIC BLOOD PRESSURE: 147 MMHG | RESPIRATION RATE: 20 BRPM | TEMPERATURE: 96.4 F

## 2024-02-08 PROCEDURE — G0299 HHS/HOSPICE OF RN EA 15 MIN: HCPCS

## 2024-02-08 PROCEDURE — 0221000100 HH NO PAY CLAIM PROCEDURE

## 2024-02-08 PROCEDURE — G0152 HHCP-SERV OF OT,EA 15 MIN: HCPCS

## 2024-02-08 ASSESSMENT — ENCOUNTER SYMPTOMS
STOOL DESCRIPTION: FORMED
DYSPNEA ACTIVITY LEVEL: AFTER AMBULATING LESS THAN 10 FT

## 2024-02-08 NOTE — HOME HEALTH
Reason for referral, [facility] SUMMARY of clinical condition: COPD admitted to home care for SN. Nursing needed for med management, oxygen teaching and pulmonary assessment and teaching      Clinical Assessment/Skilled reason for admission to home health (What this means for the patient overall and need for ongoing skilled care):patient will continue to benefit from skilled nursing services for med management, oxygen teaching and pulmonary assessment and teaching    Diagnosis: COPD    Subjective (statement from pt/cg that is relative to why you are there): patient reports needs longer oxygen tubing, waiting on delivery from oxygen compnay    Caregiver: relative.  Caregiver assists with: Bathing Caregiver unable to assist with: Medications, Meals, ADL, IADL, Wound care, Transportation and Housekeeping. Caregiver is available temporarily, normally lives alone Caregiver is not present at this visit and did not participate with clinician.    Medications reconciled and all medications are available in the home this visit. The following education was provided regarding medications: medication interactions and look alike medications: na.     A list of reconciled medications has been given to the patient/caregiver  and uploaded to media.    High risk med teaching N/A    Patient at risk for falls Yes:   Recommended requesting PT/OT orders due to fall risk YES: patient sob with miniml exertion, requires oxygen, tubing too short, few grab bars in bathroom, lives alone  Patient response to recommended requesting of PT/OT orders: agrees    Interdisciplinary communication with:  Physician for the purpose of admit notification and request for anxiety med    Written Teaching Material Utilized: admit packet med list    Clinician reviewed orientation to home health booklet with patient/caregiver including agency phone number, agency complaint process, state hotline number, as well as [employer] Commission's quality hotline number. Emergency

## 2024-02-09 ENCOUNTER — HOME CARE VISIT (OUTPATIENT)
Facility: HOME HEALTH | Age: 79
End: 2024-02-09

## 2024-02-09 VITALS
HEART RATE: 73 BPM | OXYGEN SATURATION: 94 % | TEMPERATURE: 96.4 F | SYSTOLIC BLOOD PRESSURE: 147 MMHG | RESPIRATION RATE: 18 BRPM | DIASTOLIC BLOOD PRESSURE: 80 MMHG

## 2024-02-09 VITALS
SYSTOLIC BLOOD PRESSURE: 132 MMHG | RESPIRATION RATE: 18 BRPM | DIASTOLIC BLOOD PRESSURE: 70 MMHG | TEMPERATURE: 98 F | HEART RATE: 81 BPM | OXYGEN SATURATION: 91 %

## 2024-02-09 PROCEDURE — G0151 HHCP-SERV OF PT,EA 15 MIN: HCPCS

## 2024-02-09 ASSESSMENT — ENCOUNTER SYMPTOMS: HEMOPTYSIS: 0

## 2024-02-09 NOTE — HOME HEALTH
Subjective: Patient is s/p recent 8 day hospitalization due to COPD exacerbation. Patient has returned to her one level [facility] where she lives alone. Patient has her son present at time of PT evaluation. Patient reports that she is doing better each day but is still learning how to pace herself due to her O2 levels.  Falls since last visit - no  Caregiver involvement changes: no  Home health supplies by type and quantity ordered/delivered this visit include: no    Clinician asked if patient has had any physician contact since last home care visit and patient states: no  Clinician asked if patient has any new or changed medications and patient states:  no  If Yes, were medications reconciled? yes  Was the certifying physician notified of changes - na    Clinical assessment (what this visit means for the patient overall and need for ongoing skilled care) and progress or lack of progress towards SPECIFIC goals: Patient's PLOF was independent for mobility, independent for ADLs and driving. Patient presents with decreased strength, decreased activity tolerance, and increased risk for falls.Patient's CLOF is min assist for mobility with max instruction provided to conserve energy and CGA for ADLs. Patient will benefit from continued PT to increase strength, improve balance, increase activity tolerance and achieve listed goals.     Written Teaching Material Utilized: written copy of exercises given to patient     Interdisciplinary communication with: SN    Discharge planning as follows: Patient will be discharged to independentl level under supervison of MD when PT goals are met     Specific plan for next visit: progress gait, exercises and transfers, focus on pacing and energy conservation

## 2024-02-09 NOTE — HOME HEALTH
77 yo female with PMH of pumlmonary fibrosis, emphysema in 2L of O2,  GERD, hypothyroidism who presented to ED with SOB and O2 at 80%. Pt lives in a first floor town home by herself. Son is currently staying with pt to assist.  Personable and motivated, previously mod I, including driving and wlaking without AD.  patient presents at risk of falls (MAHC-10 = 6), and ndependent for safe ADL completion (Modified Barthel Index = 95/100) during OT Eval. Pt was instructed on removing or securing throw rugs to redue fall risk. edu on safe shower transfer using side step technique. pt was able to return demo with accuracy. At this time, no additional skilled OT is warranted.     Subjective: I am feeling pretty good. I dont have any pain.   Falls since last visit No(if yes complete the Fall Tracking Form and include bsrifallreport):   Caregiver involvement changes: pt denies having help for any part of care.   Home health supplies by type and quantity ordered/delivered this visit include: NA    Clinician asked if patient has had any physician contact since last home care visit and patient states: NO  Clinician asked if patient has any new or changed medications and patient states:  NO   If Yes, were medications reconciled? NO   Was the certifying physician notified of changes in medications? N/A     Written Teaching Material Utilized: Reinforced HEP with patient.     Interdisciplinary communication with: BERNICE Collins for the purpose of OASIS collaboration and POC collaboration    Discharge planning as follows: OT EVAL ONLY.    Modified Barthel:   Feeding 10  Bathing 5  Grooming  5  Dressing  10  Bowels 10  Bladder  10  Toilet Use 10  Transfer  15  Mobility 15  Stairs 5  95/100

## 2024-02-13 ENCOUNTER — HOME CARE VISIT (OUTPATIENT)
Facility: HOME HEALTH | Age: 79
End: 2024-02-13
Payer: MEDICARE

## 2024-02-13 VITALS
HEART RATE: 100 BPM | RESPIRATION RATE: 16 BRPM | TEMPERATURE: 98.1 F | OXYGEN SATURATION: 92 % | SYSTOLIC BLOOD PRESSURE: 126 MMHG | DIASTOLIC BLOOD PRESSURE: 62 MMHG

## 2024-02-13 PROCEDURE — G0300 HHS/HOSPICE OF LPN EA 15 MIN: HCPCS

## 2024-02-14 ENCOUNTER — HOME CARE VISIT (OUTPATIENT)
Facility: HOME HEALTH | Age: 79
End: 2024-02-14
Payer: MEDICARE

## 2024-02-14 VITALS
DIASTOLIC BLOOD PRESSURE: 76 MMHG | TEMPERATURE: 98.1 F | OXYGEN SATURATION: 92 % | RESPIRATION RATE: 17 BRPM | SYSTOLIC BLOOD PRESSURE: 126 MMHG | HEART RATE: 67 BPM

## 2024-02-14 PROCEDURE — G0151 HHCP-SERV OF PT,EA 15 MIN: HCPCS

## 2024-02-14 NOTE — HOME HEALTH
Subjective: \"I just started coughing today.\"  Falls since last visit No(if yes complete the Fall Tracking Form and include bsrifallreport):   Caregiver involvement changes: No  Home health supplies by type and quantity ordered/delivered this visit include: n/a    Clinician asked if patient has had any physician contact since last home care visit and patient states: YES  Clinician asked if patient has any new or changed medications and patient states:  NO   If Yes, were medications reconciled? N/A   Was the certifying physician notified of changes in medications? N/A     Clinical assessment (what this visit means for the patient overall and need for ongoing skilled care) and progress or lack of progress towards SPECIFIC goals: Pt at risk for rehospitalization r/t fall risk, hypoxia, risk for pneumonia, COPD exacerbation.    Written Teaching Material Utilized: N/A    Interdisciplinary communication with: Attempted to contact Dr. Babak Roque MD Physician's office for the purpose of attempt to notify of pt's cough and crackling in bilateral lower lungs -- pt states that her pulmonologist is already aware of these    Discharge planning as follows: When goals are met    Specific plan for next visit: Assessment, follow-up on coughing, education as needed

## 2024-02-15 ENCOUNTER — HOME CARE VISIT (OUTPATIENT)
Facility: HOME HEALTH | Age: 79
End: 2024-02-15
Payer: MEDICARE

## 2024-02-15 VITALS
DIASTOLIC BLOOD PRESSURE: 62 MMHG | SYSTOLIC BLOOD PRESSURE: 118 MMHG | RESPIRATION RATE: 22 BRPM | TEMPERATURE: 99.1 F | HEART RATE: 108 BPM

## 2024-02-15 PROCEDURE — G0299 HHS/HOSPICE OF RN EA 15 MIN: HCPCS

## 2024-02-15 NOTE — HOME HEALTH
Subjective: Patient reports that her O2 is falling quickly with activity but recovers quickly  Falls since last visit - no  Caregiver involvement changes: no  Home health supplies by type and quantity ordered/delivered this visit include: no    Clinician asked if patient has had any physician contact since last home care visit and patient states: no  Clinician asked if patient has any new or changed medications and patient states:  no  If Yes, were medications reconciled? yes  Was the certifying physician notified of changes in medications? na    Clinical assessment (what this visit means for the patient overall and need for ongoing skilled care) and progress or lack of progress towards SPECIFIC goals: Todays treatment focused on gait training, energy conservation, and fall prevention. Patient has difficulty adding exercises due to O2 level falling quickly but with rest it came back up to 91 quickly as well. Patient verbalized underdtanding of energy conservation and stopping to rest of O2 level falls below 86. Patient will benefit from continued PT for continued 02 momitoring with activity and progression of gait and to exercises as able     Written Teaching Material Utilized: written copy of exercises given to patient     Interdisciplinary communication with:     Discharge planning as follows: reassess at the end of the orders     Specific plan for next visit: progress to exercises and ambulation distance as O2 allows

## 2024-02-16 ENCOUNTER — HOME CARE VISIT (OUTPATIENT)
Facility: HOME HEALTH | Age: 79
End: 2024-02-16
Payer: MEDICARE

## 2024-02-16 VITALS
SYSTOLIC BLOOD PRESSURE: 125 MMHG | HEART RATE: 96 BPM | DIASTOLIC BLOOD PRESSURE: 70 MMHG | TEMPERATURE: 98 F | RESPIRATION RATE: 17 BRPM | OXYGEN SATURATION: 91 %

## 2024-02-16 PROCEDURE — G0151 HHCP-SERV OF PT,EA 15 MIN: HCPCS

## 2024-02-16 NOTE — HOME HEALTH
Subjective: \"I'm always like this.\"  Falls since last visit No(if yes complete the Fall Tracking Form and include bsrifallreport):   Caregiver involvement changes: no  Home health supplies by type and quantity ordered/delivered this visit include: n/a    Clinician asked if patient has had any physician contact since last home care visit and patient states: N/A  Clinician asked if patient has any new or changed medications and patient states:  YES   If Yes, were medications reconciled? YES   Was the certifying physician notified of changes in medications? YES     Clinical assessment (what this visit means for the patient overall and need for ongoing skilled care) and progress or lack of progress towards SPECIFIC goals: Patient reported her sp02 was in the 50's while ambulating in kitchen, and rested until it was in mid 80's (at end of visit 88-92%), oxygen on 2L and patient reported that she was decreased at her appointment from 3L to 2L, patient short of breath, SN encouraged patient to use rescue inhaler which she did, and put oxygen at 3L. SN called pulmonogist Dr. Chicas office and discussed with Medical Assistant Maci regarding patient medication and clarification of oxygen orders. Per MA, spo2 parameter below 88 and oxygen is to be at 3L, SN communicated with patient regarding this and she verbalized understanding in following plan of care. Care plan adjusted to reflect this. Medication list updated and verified in the home with patient. Patient reported she is now on Cipro for a UTI and she is taking an antibiotic, patient is knowledgeable about the signs and symptoms of abx use and when to seek medical attention, patient reported that she is taking a probiotic with her abx.Assessment complete, vital signs within range, all questions answered for this visit. Patient in continued need of nursing care for disease process education.  Called Dr. Harper office and unable to reach someone or leave a

## 2024-02-17 NOTE — HOME HEALTH
Subjective: Patient reports that he is   Falls since last visit - no  Caregiver involvement changes: no  Home health supplies by type and quantity ordered/delivered this visit include: no    Clinician asked if patient has had any physician contact since last home care visit and patient states: no  Clinician asked if patient has any new or changed medications and patient states: no  If Yes, were medications reconciled? yes  Was the certifying physician notified of changes in medications? na    Clinical assessment (what this visit means for the patient overall and need for ongoing skilled care) and progress or lack of progress towards SPECIFIC goals: Todays treatment focused mainly on patient education on enery conservation, O2 monitoring and emergency signs and symptoms, patient's O2 level continues to drop with activity and quickly recovers back up to 90-91% with rest. Patient was able to verbalize and demonstrate understanding and compliance and PT will plan to follow up Monday to ensure compliance and to determine if patient is able to progress gait training,     Written Teaching Material Utilized: written copy of exercises given to patient     Interdisciplinary communication with: agency handbook given to patient     Discharge planning as follows: reassess in 2 weeks    Specific plan for next visit: progress gait as O2 level allows

## 2024-02-19 ENCOUNTER — HOSPITAL ENCOUNTER (INPATIENT)
Facility: HOSPITAL | Age: 79
LOS: 16 days | Discharge: NURSING, REHAB OR LONG-TERM CARE | DRG: 189 | End: 2024-03-06
Attending: EMERGENCY MEDICINE | Admitting: HOSPITALIST
Payer: MEDICARE

## 2024-02-19 ENCOUNTER — APPOINTMENT (OUTPATIENT)
Facility: HOSPITAL | Age: 79
DRG: 189 | End: 2024-02-19
Payer: MEDICARE

## 2024-02-19 DIAGNOSIS — R09.02 HYPOXIA: Primary | ICD-10-CM

## 2024-02-19 DIAGNOSIS — J96.21 ACUTE ON CHRONIC RESPIRATORY FAILURE WITH HYPOXIA (HCC): ICD-10-CM

## 2024-02-19 DIAGNOSIS — J96.01 ACUTE RESPIRATORY FAILURE WITH HYPOXIA (HCC): ICD-10-CM

## 2024-02-19 LAB
ALBUMIN SERPL-MCNC: 3.1 G/DL (ref 3.5–5)
ALBUMIN/GLOB SERPL: 0.8 (ref 1.1–2.2)
ALP SERPL-CCNC: 83 U/L (ref 45–117)
ALT SERPL-CCNC: 41 U/L (ref 12–78)
ANION GAP SERPL CALC-SCNC: 13 MMOL/L (ref 5–15)
AST SERPL-CCNC: 22 U/L (ref 15–37)
BASOPHILS # BLD: 0 K/UL (ref 0–0.1)
BASOPHILS NFR BLD: 0 % (ref 0–1)
BILIRUB SERPL-MCNC: 0.7 MG/DL (ref 0.2–1)
BUN SERPL-MCNC: 15 MG/DL (ref 6–20)
BUN/CREAT SERPL: 14 (ref 12–20)
CALCIUM SERPL-MCNC: 9.1 MG/DL (ref 8.5–10.1)
CHLORIDE SERPL-SCNC: 106 MMOL/L (ref 97–108)
CO2 SERPL-SCNC: 26 MMOL/L (ref 21–32)
CREAT SERPL-MCNC: 1.09 MG/DL (ref 0.55–1.02)
DIFFERENTIAL METHOD BLD: ABNORMAL
EKG ATRIAL RATE: 124 BPM
EKG DIAGNOSIS: NORMAL
EKG P AXIS: 42 DEGREES
EKG P-R INTERVAL: 126 MS
EKG Q-T INTERVAL: 296 MS
EKG QRS DURATION: 64 MS
EKG QTC CALCULATION (BAZETT): 425 MS
EKG R AXIS: 59 DEGREES
EKG T AXIS: 38 DEGREES
EKG VENTRICULAR RATE: 124 BPM
EOSINOPHIL # BLD: 0.1 K/UL (ref 0–0.4)
EOSINOPHIL NFR BLD: 1 % (ref 0–7)
ERYTHROCYTE [DISTWIDTH] IN BLOOD BY AUTOMATED COUNT: 14 % (ref 11.5–14.5)
GLOBULIN SER CALC-MCNC: 4.1 G/DL (ref 2–4)
GLUCOSE SERPL-MCNC: 102 MG/DL (ref 65–100)
HCT VFR BLD AUTO: 43.8 % (ref 35–47)
HGB BLD-MCNC: 14.3 G/DL (ref 11.5–16)
IMM GRANULOCYTES # BLD AUTO: 0.1 K/UL (ref 0–0.04)
IMM GRANULOCYTES NFR BLD AUTO: 1 % (ref 0–0.5)
LYMPHOCYTES # BLD: 0.6 K/UL (ref 0.8–3.5)
LYMPHOCYTES NFR BLD: 5 % (ref 12–49)
MAGNESIUM SERPL-MCNC: 1.8 MG/DL (ref 1.6–2.4)
MCH RBC QN AUTO: 32.4 PG (ref 26–34)
MCHC RBC AUTO-ENTMCNC: 32.6 G/DL (ref 30–36.5)
MCV RBC AUTO: 99.1 FL (ref 80–99)
MONOCYTES # BLD: 0.6 K/UL (ref 0–1)
MONOCYTES NFR BLD: 5 % (ref 5–13)
NEUTS SEG # BLD: 10.1 K/UL (ref 1.8–8)
NEUTS SEG NFR BLD: 88 % (ref 32–75)
NRBC # BLD: 0 K/UL (ref 0–0.01)
NRBC BLD-RTO: 0 PER 100 WBC
PLATELET # BLD AUTO: 142 K/UL (ref 150–400)
PLATELET COMMENT: ABNORMAL
PMV BLD AUTO: 11.6 FL (ref 8.9–12.9)
POTASSIUM SERPL-SCNC: 3.4 MMOL/L (ref 3.5–5.1)
PROT SERPL-MCNC: 7.2 G/DL (ref 6.4–8.2)
RBC # BLD AUTO: 4.42 M/UL (ref 3.8–5.2)
RBC MORPH BLD: ABNORMAL
SODIUM SERPL-SCNC: 145 MMOL/L (ref 136–145)
TROPONIN I SERPL HS-MCNC: 9 NG/L (ref 0–51)
WBC # BLD AUTO: 11.5 K/UL (ref 3.6–11)
WBC MORPH BLD: ABNORMAL

## 2024-02-19 PROCEDURE — 36415 COLL VENOUS BLD VENIPUNCTURE: CPT

## 2024-02-19 PROCEDURE — 83735 ASSAY OF MAGNESIUM: CPT

## 2024-02-19 PROCEDURE — 93005 ELECTROCARDIOGRAM TRACING: CPT | Performed by: EMERGENCY MEDICINE

## 2024-02-19 PROCEDURE — 71275 CT ANGIOGRAPHY CHEST: CPT

## 2024-02-19 PROCEDURE — 93010 ELECTROCARDIOGRAM REPORT: CPT | Performed by: SPECIALIST

## 2024-02-19 PROCEDURE — 6360000004 HC RX CONTRAST MEDICATION: Performed by: EMERGENCY MEDICINE

## 2024-02-19 PROCEDURE — 84484 ASSAY OF TROPONIN QUANT: CPT

## 2024-02-19 PROCEDURE — 2580000003 HC RX 258: Performed by: EMERGENCY MEDICINE

## 2024-02-19 PROCEDURE — 2060000000 HC ICU INTERMEDIATE R&B

## 2024-02-19 PROCEDURE — 71045 X-RAY EXAM CHEST 1 VIEW: CPT

## 2024-02-19 PROCEDURE — 6360000002 HC RX W HCPCS: Performed by: EMERGENCY MEDICINE

## 2024-02-19 PROCEDURE — 99285 EMERGENCY DEPT VISIT HI MDM: CPT

## 2024-02-19 PROCEDURE — 85025 COMPLETE CBC W/AUTO DIFF WBC: CPT

## 2024-02-19 PROCEDURE — 96374 THER/PROPH/DIAG INJ IV PUSH: CPT

## 2024-02-19 PROCEDURE — 80053 COMPREHEN METABOLIC PANEL: CPT

## 2024-02-19 RX ORDER — SODIUM CHLORIDE 0.9 % (FLUSH) 0.9 %
5-40 SYRINGE (ML) INJECTION EVERY 12 HOURS SCHEDULED
Status: DISCONTINUED | OUTPATIENT
Start: 2024-02-19 | End: 2024-03-06 | Stop reason: HOSPADM

## 2024-02-19 RX ORDER — ACETAMINOPHEN 325 MG/1
650 TABLET ORAL EVERY 6 HOURS PRN
Status: DISCONTINUED | OUTPATIENT
Start: 2024-02-19 | End: 2024-03-06 | Stop reason: HOSPADM

## 2024-02-19 RX ORDER — POTASSIUM CHLORIDE 750 MG/1
40 TABLET, FILM COATED, EXTENDED RELEASE ORAL ONCE
Status: COMPLETED | OUTPATIENT
Start: 2024-02-19 | End: 2024-02-20

## 2024-02-19 RX ORDER — ONDANSETRON 2 MG/ML
4 INJECTION INTRAMUSCULAR; INTRAVENOUS EVERY 6 HOURS PRN
Status: DISCONTINUED | OUTPATIENT
Start: 2024-02-19 | End: 2024-03-06 | Stop reason: HOSPADM

## 2024-02-19 RX ORDER — IPRATROPIUM BROMIDE AND ALBUTEROL SULFATE 2.5; .5 MG/3ML; MG/3ML
1 SOLUTION RESPIRATORY (INHALATION)
Status: DISCONTINUED | OUTPATIENT
Start: 2024-02-19 | End: 2024-02-23

## 2024-02-19 RX ORDER — PANTOPRAZOLE SODIUM 40 MG/1
40 TABLET, DELAYED RELEASE ORAL 2 TIMES DAILY
Status: DISCONTINUED | OUTPATIENT
Start: 2024-02-19 | End: 2024-03-06 | Stop reason: HOSPADM

## 2024-02-19 RX ORDER — SODIUM CHLORIDE 0.9 % (FLUSH) 0.9 %
5-40 SYRINGE (ML) INJECTION PRN
Status: DISCONTINUED | OUTPATIENT
Start: 2024-02-19 | End: 2024-03-06 | Stop reason: HOSPADM

## 2024-02-19 RX ORDER — POLYETHYLENE GLYCOL 3350 17 G/17G
17 POWDER, FOR SOLUTION ORAL DAILY PRN
Status: DISCONTINUED | OUTPATIENT
Start: 2024-02-19 | End: 2024-03-06 | Stop reason: HOSPADM

## 2024-02-19 RX ORDER — ENOXAPARIN SODIUM 100 MG/ML
40 INJECTION SUBCUTANEOUS DAILY
Status: DISCONTINUED | OUTPATIENT
Start: 2024-02-20 | End: 2024-03-06 | Stop reason: HOSPADM

## 2024-02-19 RX ORDER — POTASSIUM CHLORIDE 7.45 MG/ML
10 INJECTION INTRAVENOUS PRN
Status: DISCONTINUED | OUTPATIENT
Start: 2024-02-19 | End: 2024-03-06 | Stop reason: HOSPADM

## 2024-02-19 RX ORDER — ONDANSETRON 4 MG/1
4 TABLET, ORALLY DISINTEGRATING ORAL EVERY 8 HOURS PRN
Status: DISCONTINUED | OUTPATIENT
Start: 2024-02-19 | End: 2024-03-06 | Stop reason: HOSPADM

## 2024-02-19 RX ORDER — GABAPENTIN 300 MG/1
300 CAPSULE ORAL 3 TIMES DAILY
Status: DISCONTINUED | OUTPATIENT
Start: 2024-02-19 | End: 2024-03-06 | Stop reason: HOSPADM

## 2024-02-19 RX ORDER — POTASSIUM CHLORIDE 750 MG/1
40 TABLET, FILM COATED, EXTENDED RELEASE ORAL PRN
Status: DISCONTINUED | OUTPATIENT
Start: 2024-02-19 | End: 2024-03-06 | Stop reason: HOSPADM

## 2024-02-19 RX ORDER — GUAIFENESIN 600 MG/1
600 TABLET, EXTENDED RELEASE ORAL 2 TIMES DAILY
Status: DISCONTINUED | OUTPATIENT
Start: 2024-02-19 | End: 2024-02-22

## 2024-02-19 RX ORDER — MAGNESIUM SULFATE IN WATER 40 MG/ML
2000 INJECTION, SOLUTION INTRAVENOUS PRN
Status: DISCONTINUED | OUTPATIENT
Start: 2024-02-19 | End: 2024-03-06 | Stop reason: HOSPADM

## 2024-02-19 RX ORDER — ACETAMINOPHEN 650 MG/1
650 SUPPOSITORY RECTAL EVERY 6 HOURS PRN
Status: DISCONTINUED | OUTPATIENT
Start: 2024-02-19 | End: 2024-03-06 | Stop reason: HOSPADM

## 2024-02-19 RX ORDER — LEVOTHYROXINE SODIUM 88 UG/1
88 TABLET ORAL DAILY
Status: DISCONTINUED | OUTPATIENT
Start: 2024-02-20 | End: 2024-03-06 | Stop reason: HOSPADM

## 2024-02-19 RX ORDER — MONTELUKAST SODIUM 10 MG/1
10 TABLET ORAL DAILY
Status: DISCONTINUED | OUTPATIENT
Start: 2024-02-20 | End: 2024-03-06 | Stop reason: HOSPADM

## 2024-02-19 RX ORDER — SODIUM CHLORIDE 9 MG/ML
INJECTION, SOLUTION INTRAVENOUS PRN
Status: DISCONTINUED | OUTPATIENT
Start: 2024-02-19 | End: 2024-03-06 | Stop reason: HOSPADM

## 2024-02-19 RX ORDER — MAGNESIUM SULFATE IN WATER 40 MG/ML
2000 INJECTION, SOLUTION INTRAVENOUS ONCE
Status: COMPLETED | OUTPATIENT
Start: 2024-02-19 | End: 2024-02-20

## 2024-02-19 RX ADMIN — IOPAMIDOL 100 ML: 755 INJECTION, SOLUTION INTRAVENOUS at 13:37

## 2024-02-19 RX ADMIN — WATER 125 MG: 1 INJECTION INTRAMUSCULAR; INTRAVENOUS; SUBCUTANEOUS at 13:47

## 2024-02-19 ASSESSMENT — PAIN SCALES - GENERAL
PAINLEVEL_OUTOF10: 0
PAINLEVEL_OUTOF10: 0

## 2024-02-19 NOTE — ED TRIAGE NOTES
Pt c/o SOB and her sats dipping into the 50% when active. Pt is usually on 3L NC, currently on 5L. Pt 66% on 5L when getting into stretcher. Pt has pulmonary fibrosis and COPD. Pt on abx for UTI

## 2024-02-19 NOTE — ED NOTES
Patient noted to be 84%-86% on 5L NC. Dr. Pereira states that oxygen goal is > 88%. Patient placed on midflow oxygen at 6L. Patient now satting 88%-89%.

## 2024-02-19 NOTE — ED PROVIDER NOTES
[facility]  EMERGENCY DEPARTMENT ENCOUNTER      Pt Name: Milly Morse  MRN: [id number]  Birthdate 1945  Date of evaluation: 2/19/2024  Provider: Erlin Jeter DO    CHIEF COMPLAINT       Chief Complaint   Patient presents with    Shortness of Breath         HISTORY OF PRESENT ILLNESS   (Location/Symptom, Timing/Onset, Context/Setting, Quality, Duration, Modifying Factors, Severity)  Note limiting factors.   78-year-old female history of pulmonary fibrosis comes in for shortness of breath.  History of COPD as well.  Recent admission to the hospital where she did require blood work and BiPAP.  She was weaned off steroids over the last several days since her discharge on the seventh and has gotten progressively worse.  She arrives here 66% on room air requiring supplemental oxygen to maintain a saturation about 91.  Patient states upon arrival that she is feeling better with the extra oxygen.  She does not think she needs BiPAP at the moment.  She is never had to be intubated for this.  She denies leg pain leg swelling history of pulmonary embolism or other acute complaints            Review of External Medical Records:     Nursing Notes were reviewed.    REVIEW OF SYSTEMS    (2-9 systems for level 4, 10 or more for level 5)     Review of Systems    Except as noted above the remainder of the review of systems was reviewed and negative.       PAST MEDICAL HISTORY     Past Medical History:   Diagnosis Date    Adverse effect of anesthesia     vocal cord swelling with bronchoscopy - was given lidocaine for bronchoscopy - unsure if lidocaine this caused the swelling/\"put me out the second time\" and did not use lidocaine and there no problem    COPD (chronic obstructive pulmonary disease) (HCC)     does not use oxygen, \"mild COPD\" per pt, CXR states otherwise    Ill-defined condition     uses 2 liters of oxygen    Ill-defined condition     pulmonary fibrosis    Menopause     Pulmonary fibrosis (HCC)

## 2024-02-20 ENCOUNTER — HOME CARE VISIT (OUTPATIENT)
Facility: HOME HEALTH | Age: 79
End: 2024-02-20
Payer: MEDICARE

## 2024-02-20 ENCOUNTER — HOME CARE VISIT (OUTPATIENT)
Dept: HOME HEALTH SERVICES | Facility: HOME HEALTH | Age: 79
End: 2024-02-20
Payer: MEDICARE

## 2024-02-20 LAB
ALBUMIN SERPL-MCNC: 2.9 G/DL (ref 3.5–5)
ALBUMIN/GLOB SERPL: 0.7 (ref 1.1–2.2)
ALP SERPL-CCNC: 76 U/L (ref 45–117)
ALT SERPL-CCNC: 34 U/L (ref 12–78)
ANION GAP SERPL CALC-SCNC: 4 MMOL/L (ref 5–15)
AST SERPL-CCNC: 16 U/L (ref 15–37)
B PERT DNA SPEC QL NAA+PROBE: NOT DETECTED
BASOPHILS # BLD: 0 K/UL (ref 0–0.1)
BASOPHILS NFR BLD: 0 % (ref 0–1)
BILIRUB SERPL-MCNC: 0.6 MG/DL (ref 0.2–1)
BORDETELLA PARAPERTUSSIS BY PCR: NOT DETECTED
BUN SERPL-MCNC: 12 MG/DL (ref 6–20)
BUN/CREAT SERPL: 15 (ref 12–20)
C PNEUM DNA SPEC QL NAA+PROBE: NOT DETECTED
CALCIUM SERPL-MCNC: 9.2 MG/DL (ref 8.5–10.1)
CHLORIDE SERPL-SCNC: 112 MMOL/L (ref 97–108)
CO2 SERPL-SCNC: 23 MMOL/L (ref 21–32)
CREAT SERPL-MCNC: 0.81 MG/DL (ref 0.55–1.02)
DIFFERENTIAL METHOD BLD: ABNORMAL
EOSINOPHIL # BLD: 0 K/UL (ref 0–0.4)
EOSINOPHIL NFR BLD: 0 % (ref 0–7)
ERYTHROCYTE [DISTWIDTH] IN BLOOD BY AUTOMATED COUNT: 13.7 % (ref 11.5–14.5)
FLUAV SUBTYP SPEC NAA+PROBE: NOT DETECTED
FLUBV RNA SPEC QL NAA+PROBE: NOT DETECTED
GLOBULIN SER CALC-MCNC: 3.9 G/DL (ref 2–4)
GLUCOSE SERPL-MCNC: 165 MG/DL (ref 65–100)
HADV DNA SPEC QL NAA+PROBE: NOT DETECTED
HCOV 229E RNA SPEC QL NAA+PROBE: NOT DETECTED
HCOV HKU1 RNA SPEC QL NAA+PROBE: NOT DETECTED
HCOV NL63 RNA SPEC QL NAA+PROBE: NOT DETECTED
HCOV OC43 RNA SPEC QL NAA+PROBE: NOT DETECTED
HCT VFR BLD AUTO: 39.4 % (ref 35–47)
HGB BLD-MCNC: 12.6 G/DL (ref 11.5–16)
HMPV RNA SPEC QL NAA+PROBE: NOT DETECTED
HPIV1 RNA SPEC QL NAA+PROBE: NOT DETECTED
HPIV2 RNA SPEC QL NAA+PROBE: NOT DETECTED
HPIV3 RNA SPEC QL NAA+PROBE: NOT DETECTED
HPIV4 RNA SPEC QL NAA+PROBE: NOT DETECTED
IMM GRANULOCYTES # BLD AUTO: 0.1 K/UL (ref 0–0.04)
IMM GRANULOCYTES NFR BLD AUTO: 1 % (ref 0–0.5)
LYMPHOCYTES # BLD: 0.3 K/UL (ref 0.8–3.5)
LYMPHOCYTES NFR BLD: 4 % (ref 12–49)
M PNEUMO DNA SPEC QL NAA+PROBE: NOT DETECTED
MCH RBC QN AUTO: 32.2 PG (ref 26–34)
MCHC RBC AUTO-ENTMCNC: 32 G/DL (ref 30–36.5)
MCV RBC AUTO: 100.8 FL (ref 80–99)
MONOCYTES # BLD: 0.2 K/UL (ref 0–1)
MONOCYTES NFR BLD: 3 % (ref 5–13)
NEUTS SEG # BLD: 6.8 K/UL (ref 1.8–8)
NEUTS SEG NFR BLD: 92 % (ref 32–75)
NRBC # BLD: 0 K/UL (ref 0–0.01)
NRBC BLD-RTO: 0 PER 100 WBC
PLATELET # BLD AUTO: 143 K/UL (ref 150–400)
PMV BLD AUTO: 11.6 FL (ref 8.9–12.9)
POTASSIUM SERPL-SCNC: 4.4 MMOL/L (ref 3.5–5.1)
PROT SERPL-MCNC: 6.8 G/DL (ref 6.4–8.2)
RBC # BLD AUTO: 3.91 M/UL (ref 3.8–5.2)
RBC MORPH BLD: ABNORMAL
RSV RNA SPEC QL NAA+PROBE: NOT DETECTED
RV+EV RNA SPEC QL NAA+PROBE: NOT DETECTED
SARS-COV-2 RNA RESP QL NAA+PROBE: NOT DETECTED
SODIUM SERPL-SCNC: 139 MMOL/L (ref 136–145)
WBC # BLD AUTO: 7.4 K/UL (ref 3.6–11)

## 2024-02-20 PROCEDURE — 2700000000 HC OXYGEN THERAPY PER DAY

## 2024-02-20 PROCEDURE — 6370000000 HC RX 637 (ALT 250 FOR IP): Performed by: STUDENT IN AN ORGANIZED HEALTH CARE EDUCATION/TRAINING PROGRAM

## 2024-02-20 PROCEDURE — 6360000002 HC RX W HCPCS: Performed by: STUDENT IN AN ORGANIZED HEALTH CARE EDUCATION/TRAINING PROGRAM

## 2024-02-20 PROCEDURE — 2060000000 HC ICU INTERMEDIATE R&B

## 2024-02-20 PROCEDURE — 80053 COMPREHEN METABOLIC PANEL: CPT

## 2024-02-20 PROCEDURE — 0202U NFCT DS 22 TRGT SARS-COV-2: CPT

## 2024-02-20 PROCEDURE — 36415 COLL VENOUS BLD VENIPUNCTURE: CPT

## 2024-02-20 PROCEDURE — 94760 N-INVAS EAR/PLS OXIMETRY 1: CPT

## 2024-02-20 PROCEDURE — 2500000003 HC RX 250 WO HCPCS: Performed by: STUDENT IN AN ORGANIZED HEALTH CARE EDUCATION/TRAINING PROGRAM

## 2024-02-20 PROCEDURE — 2580000003 HC RX 258: Performed by: STUDENT IN AN ORGANIZED HEALTH CARE EDUCATION/TRAINING PROGRAM

## 2024-02-20 PROCEDURE — 94640 AIRWAY INHALATION TREATMENT: CPT

## 2024-02-20 PROCEDURE — 85025 COMPLETE CBC W/AUTO DIFF WBC: CPT

## 2024-02-20 RX ADMIN — IPRATROPIUM BROMIDE AND ALBUTEROL SULFATE 1 DOSE: 2.5; .5 SOLUTION RESPIRATORY (INHALATION) at 13:19

## 2024-02-20 RX ADMIN — ARFORMOTEROL TARTRATE: 15 SOLUTION RESPIRATORY (INHALATION) at 20:46

## 2024-02-20 RX ADMIN — GUAIFENESIN 600 MG: 600 TABLET, EXTENDED RELEASE ORAL at 01:11

## 2024-02-20 RX ADMIN — PANTOPRAZOLE SODIUM 40 MG: 40 TABLET, DELAYED RELEASE ORAL at 09:09

## 2024-02-20 RX ADMIN — GABAPENTIN 300 MG: 300 CAPSULE ORAL at 14:49

## 2024-02-20 RX ADMIN — WATER 2000 MG: 1 INJECTION INTRAMUSCULAR; INTRAVENOUS; SUBCUTANEOUS at 00:32

## 2024-02-20 RX ADMIN — PANTOPRAZOLE SODIUM 40 MG: 40 TABLET, DELAYED RELEASE ORAL at 20:09

## 2024-02-20 RX ADMIN — GABAPENTIN 300 MG: 300 CAPSULE ORAL at 01:05

## 2024-02-20 RX ADMIN — POTASSIUM CHLORIDE 40 MEQ: 750 TABLET, FILM COATED, EXTENDED RELEASE ORAL at 00:32

## 2024-02-20 RX ADMIN — IPRATROPIUM BROMIDE AND ALBUTEROL SULFATE 1 DOSE: 2.5; .5 SOLUTION RESPIRATORY (INHALATION) at 01:05

## 2024-02-20 RX ADMIN — PANTOPRAZOLE SODIUM 40 MG: 40 TABLET, DELAYED RELEASE ORAL at 01:06

## 2024-02-20 RX ADMIN — GABAPENTIN 300 MG: 300 CAPSULE ORAL at 20:09

## 2024-02-20 RX ADMIN — ARFORMOTEROL TARTRATE: 15 SOLUTION RESPIRATORY (INHALATION) at 01:38

## 2024-02-20 RX ADMIN — WATER 80 MG: 1 INJECTION INTRAMUSCULAR; INTRAVENOUS; SUBCUTANEOUS at 23:04

## 2024-02-20 RX ADMIN — IPRATROPIUM BROMIDE AND ALBUTEROL SULFATE 1 DOSE: 2.5; .5 SOLUTION RESPIRATORY (INHALATION) at 04:23

## 2024-02-20 RX ADMIN — SODIUM CHLORIDE, PRESERVATIVE FREE 10 ML: 5 INJECTION INTRAVENOUS at 20:39

## 2024-02-20 RX ADMIN — ARFORMOTEROL TARTRATE: 15 SOLUTION RESPIRATORY (INHALATION) at 07:45

## 2024-02-20 RX ADMIN — MAGNESIUM SULFATE HEPTAHYDRATE 2000 MG: 40 INJECTION, SOLUTION INTRAVENOUS at 00:53

## 2024-02-20 RX ADMIN — WATER 80 MG: 1 INJECTION INTRAMUSCULAR; INTRAVENOUS; SUBCUTANEOUS at 09:10

## 2024-02-20 RX ADMIN — WATER 80 MG: 1 INJECTION INTRAMUSCULAR; INTRAVENOUS; SUBCUTANEOUS at 01:05

## 2024-02-20 RX ADMIN — SODIUM CHLORIDE, PRESERVATIVE FREE 10 ML: 5 INJECTION INTRAVENOUS at 00:33

## 2024-02-20 RX ADMIN — GUAIFENESIN 600 MG: 600 TABLET, EXTENDED RELEASE ORAL at 09:09

## 2024-02-20 RX ADMIN — GUAIFENESIN 600 MG: 600 TABLET, EXTENDED RELEASE ORAL at 20:09

## 2024-02-20 RX ADMIN — ENOXAPARIN SODIUM 40 MG: 100 INJECTION SUBCUTANEOUS at 09:09

## 2024-02-20 RX ADMIN — SODIUM CHLORIDE, PRESERVATIVE FREE 10 ML: 5 INJECTION INTRAVENOUS at 09:10

## 2024-02-20 RX ADMIN — LEVOTHYROXINE SODIUM 88 MCG: 0.09 TABLET ORAL at 08:13

## 2024-02-20 RX ADMIN — DOXYCYCLINE 100 MG: 100 INJECTION, POWDER, LYOPHILIZED, FOR SOLUTION INTRAVENOUS at 00:33

## 2024-02-20 RX ADMIN — GABAPENTIN 300 MG: 300 CAPSULE ORAL at 09:09

## 2024-02-20 RX ADMIN — IPRATROPIUM BROMIDE AND ALBUTEROL SULFATE 1 DOSE: 2.5; .5 SOLUTION RESPIRATORY (INHALATION) at 07:45

## 2024-02-20 RX ADMIN — DOXYCYCLINE 100 MG: 100 INJECTION, POWDER, LYOPHILIZED, FOR SOLUTION INTRAVENOUS at 11:21

## 2024-02-20 RX ADMIN — MONTELUKAST 10 MG: 10 TABLET, FILM COATED ORAL at 09:09

## 2024-02-20 RX ADMIN — IPRATROPIUM BROMIDE AND ALBUTEROL SULFATE 1 DOSE: 2.5; .5 SOLUTION RESPIRATORY (INHALATION) at 16:10

## 2024-02-20 RX ADMIN — DOXYCYCLINE 100 MG: 100 INJECTION, POWDER, LYOPHILIZED, FOR SOLUTION INTRAVENOUS at 23:04

## 2024-02-20 RX ADMIN — IPRATROPIUM BROMIDE AND ALBUTEROL SULFATE 1 DOSE: 2.5; .5 SOLUTION RESPIRATORY (INHALATION) at 20:46

## 2024-02-20 RX ADMIN — WATER 80 MG: 1 INJECTION INTRAMUSCULAR; INTRAVENOUS; SUBCUTANEOUS at 16:06

## 2024-02-20 ASSESSMENT — COPD QUESTIONNAIRES: TOTAL_EXACERBATIONS_PASTYEAR: 2

## 2024-02-20 ASSESSMENT — ENCOUNTER SYMPTOMS: HEMOPTYSIS: 0

## 2024-02-20 ASSESSMENT — PULMONARY FUNCTION TESTS
FEV1 (%PREDICTED): 100
POST BRONCHODILATOR FEV1/FVC: 73

## 2024-02-20 NOTE — ED NOTES
Patient transported to [facility] with [facility] ambulance transport team.

## 2024-02-20 NOTE — ED NOTES
Verbal shift change report given to BERNICE Whitten (oncoming nurse) by BERNICE Aguilar (offgoing nurse). Report included the following information Index, ED Encounter Summary, MAR, Recent Results, and Cardiac Rhythm NSR .

## 2024-02-20 NOTE — H&P
History & Physical    Primary Care Provider: Babak Roque MD  Source of Information: Patient and chart review    History of Presenting Illness:   Milly Morse is a 78 y.o. female with history of pulmonary fibrosis, COPD, chronic respiratory failure on 2 to 4 L nasal cannula, hypothyroidism who presented to [facility] from ED with complaints of shortness of breath.  Well-known to medicine service and was recently discharged 2 weeks ago for acute respiratory failure requiring BiPAP.  She had been in her usual state of health and had recovered well since hospital discharge consult 4 days ago when she noted mild dyspnea which has progressed gradually.  The patient denies any fever, chills, chest or abdominal pain, nausea, vomiting, cough, congestion, recent illness, palpitations, or dysuria.    Remarkable vitals on ER Presentation: spo2 to 66% on RA  Labs Remarkable for: unremarkable  ER Images: CT chest showed severe emphysema  ER Rx: DuoNebs     Review of Systems:  Pertinent items are noted in the History of Present Illness.     Past Medical History:   Diagnosis Date    Adverse effect of anesthesia     vocal cord swelling with bronchoscopy - was given lidocaine for bronchoscopy - unsure if lidocaine this caused the swelling/\"put me out the second time\" and did not use lidocaine and there no problem    COPD (chronic obstructive pulmonary disease) (HCC)     does not use oxygen, \"mild COPD\" per pt, CXR states otherwise    Ill-defined condition     uses 2 liters of oxygen    Ill-defined condition     pulmonary fibrosis    Menopause     Pulmonary fibrosis (HCC)     Thyroid disease     hypothryoid      Past Surgical History:   Procedure Laterality Date    CHOLECYSTECTOMY      COLONOSCOPY  9.2015    polyps x2    COLONOSCOPY  12/2018    polyp    COLONOSCOPY N/A 4/22/2021    COLONOSCOPY   :- performed by Alfredo Rey MD at [facility] ENDOSCOPY    COLONOSCOPY N/A 12/3/2018    COLONOSCOPY performed by Anita Yañez MD at [facility]

## 2024-02-20 NOTE — CARE COORDINATION
Care Management Initial Assessment       RUR: 18%  Readmission? Yes - 01/30/2024-02/07/2024  1st IM letter given? Yes - 02/19/2024  1st  letter given: No    Chart reviewed. IDR completed. Pulmonary consulted. Pt currently on 55L O2 HFNC. Pt not appropriate for initial assessment at this time secondary to medical condition- hypoxia with minimal movement or speaking. Care plan ongoing.      02/20/24 1416   Service Assessment   Patient Orientation Unable to Assess   Cognition Alert   History Provided By Medical Record   Primary Caregiver Self   Support Systems Children   Patient's Healthcare Decision Maker is: Legal Next of Kin   PCP Verified by CM Yes   Last Visit to PCP Within last 6 months   Prior Functional Level Independent in ADLs/IADLs   Current Functional Level Other (see comment)  (Unknown at Present)   Can patient return to prior living arrangement Unknown at present   Ability to make needs known: Good   Family able to assist with home care needs: Yes   Would you like for me to discuss the discharge plan with any other family members/significant others, and if so, who? Yes  (Daughter-in-Law, Emelia Morse (ph#: [phone]))   Financial Resources Medicare   Community Resources None   Social/Functional History   Lives With Alone   Type of Home Condo   Home Layout One level   Home Access Level entry   Bathroom Equipment Shower chair   Home Equipment Oxygen  (Purchased Out-of-Pocket- Concentrator and 2 Portable Tanks)   ADL Assistance Independent   Homemaking Assistance Independent   Ambulation Assistance Independent   Transfer Assistance Independent   Active  Yes   Discharge Planning   Type of Residence House   Living Arrangements Alone   Current Services Prior To Admission Home Care;Durable Medical Equipment   Current DME Prior to Arrival Shower Chair   Potential Assistance Needed Home Care   DME Ordered? No   Potential Assistance Purchasing Medications No   Type of Home Care Services

## 2024-02-21 LAB
ANION GAP SERPL CALC-SCNC: 3 MMOL/L (ref 5–15)
BASOPHILS # BLD: 0 K/UL (ref 0–0.1)
BASOPHILS NFR BLD: 0 % (ref 0–1)
BUN SERPL-MCNC: 18 MG/DL (ref 6–20)
BUN/CREAT SERPL: 20 (ref 12–20)
CALCIUM SERPL-MCNC: 9.1 MG/DL (ref 8.5–10.1)
CHLORIDE SERPL-SCNC: 113 MMOL/L (ref 97–108)
CO2 SERPL-SCNC: 24 MMOL/L (ref 21–32)
CREAT SERPL-MCNC: 0.88 MG/DL (ref 0.55–1.02)
DIFFERENTIAL METHOD BLD: ABNORMAL
EOSINOPHIL # BLD: 0 K/UL (ref 0–0.4)
EOSINOPHIL NFR BLD: 0 % (ref 0–7)
ERYTHROCYTE [DISTWIDTH] IN BLOOD BY AUTOMATED COUNT: 14.4 % (ref 11.5–14.5)
GLUCOSE SERPL-MCNC: 135 MG/DL (ref 65–100)
HCT VFR BLD AUTO: 40.4 % (ref 35–47)
HGB BLD-MCNC: 13.2 G/DL (ref 11.5–16)
IMM GRANULOCYTES # BLD AUTO: 0.1 K/UL (ref 0–0.04)
IMM GRANULOCYTES NFR BLD AUTO: 1 % (ref 0–0.5)
LYMPHOCYTES # BLD: 0.5 K/UL (ref 0.8–3.5)
LYMPHOCYTES NFR BLD: 4 % (ref 12–49)
MAGNESIUM SERPL-MCNC: 2.7 MG/DL (ref 1.6–2.4)
MCH RBC QN AUTO: 33.4 PG (ref 26–34)
MCHC RBC AUTO-ENTMCNC: 32.7 G/DL (ref 30–36.5)
MCV RBC AUTO: 102.3 FL (ref 80–99)
MONOCYTES # BLD: 0.6 K/UL (ref 0–1)
MONOCYTES NFR BLD: 5 % (ref 5–13)
NEUTS SEG # BLD: 11.1 K/UL (ref 1.8–8)
NEUTS SEG NFR BLD: 90 % (ref 32–75)
NRBC # BLD: 0 K/UL (ref 0–0.01)
NRBC BLD-RTO: 0 PER 100 WBC
PLATELET # BLD AUTO: 162 K/UL (ref 150–400)
PMV BLD AUTO: 11.9 FL (ref 8.9–12.9)
POTASSIUM SERPL-SCNC: 4.3 MMOL/L (ref 3.5–5.1)
RBC # BLD AUTO: 3.95 M/UL (ref 3.8–5.2)
RBC MORPH BLD: ABNORMAL
SODIUM SERPL-SCNC: 140 MMOL/L (ref 136–145)
WBC # BLD AUTO: 12.3 K/UL (ref 3.6–11)

## 2024-02-21 PROCEDURE — 2580000003 HC RX 258: Performed by: STUDENT IN AN ORGANIZED HEALTH CARE EDUCATION/TRAINING PROGRAM

## 2024-02-21 PROCEDURE — 94760 N-INVAS EAR/PLS OXIMETRY 1: CPT

## 2024-02-21 PROCEDURE — 83735 ASSAY OF MAGNESIUM: CPT

## 2024-02-21 PROCEDURE — 85025 COMPLETE CBC W/AUTO DIFF WBC: CPT

## 2024-02-21 PROCEDURE — 6360000002 HC RX W HCPCS: Performed by: STUDENT IN AN ORGANIZED HEALTH CARE EDUCATION/TRAINING PROGRAM

## 2024-02-21 PROCEDURE — 2700000000 HC OXYGEN THERAPY PER DAY

## 2024-02-21 PROCEDURE — 2500000003 HC RX 250 WO HCPCS: Performed by: STUDENT IN AN ORGANIZED HEALTH CARE EDUCATION/TRAINING PROGRAM

## 2024-02-21 PROCEDURE — 97165 OT EVAL LOW COMPLEX 30 MIN: CPT

## 2024-02-21 PROCEDURE — 80048 BASIC METABOLIC PNL TOTAL CA: CPT

## 2024-02-21 PROCEDURE — 6370000000 HC RX 637 (ALT 250 FOR IP): Performed by: STUDENT IN AN ORGANIZED HEALTH CARE EDUCATION/TRAINING PROGRAM

## 2024-02-21 PROCEDURE — 97162 PT EVAL MOD COMPLEX 30 MIN: CPT

## 2024-02-21 PROCEDURE — 97530 THERAPEUTIC ACTIVITIES: CPT

## 2024-02-21 PROCEDURE — 6370000000 HC RX 637 (ALT 250 FOR IP): Performed by: HOSPITALIST

## 2024-02-21 PROCEDURE — 36415 COLL VENOUS BLD VENIPUNCTURE: CPT

## 2024-02-21 PROCEDURE — 2060000000 HC ICU INTERMEDIATE R&B

## 2024-02-21 PROCEDURE — 94640 AIRWAY INHALATION TREATMENT: CPT

## 2024-02-21 PROCEDURE — 6370000000 HC RX 637 (ALT 250 FOR IP)

## 2024-02-21 RX ORDER — HYDROXYZINE HYDROCHLORIDE 10 MG/1
10 TABLET, FILM COATED ORAL ONCE
Status: COMPLETED | OUTPATIENT
Start: 2024-02-21 | End: 2024-02-21

## 2024-02-21 RX ORDER — DOXYCYCLINE HYCLATE 100 MG
100 TABLET ORAL EVERY 12 HOURS SCHEDULED
Status: COMPLETED | OUTPATIENT
Start: 2024-02-21 | End: 2024-02-24

## 2024-02-21 RX ADMIN — SODIUM CHLORIDE, PRESERVATIVE FREE 10 ML: 5 INJECTION INTRAVENOUS at 09:40

## 2024-02-21 RX ADMIN — PANTOPRAZOLE SODIUM 40 MG: 40 TABLET, DELAYED RELEASE ORAL at 09:37

## 2024-02-21 RX ADMIN — WATER 80 MG: 1 INJECTION INTRAMUSCULAR; INTRAVENOUS; SUBCUTANEOUS at 22:40

## 2024-02-21 RX ADMIN — WATER 80 MG: 1 INJECTION INTRAMUSCULAR; INTRAVENOUS; SUBCUTANEOUS at 13:28

## 2024-02-21 RX ADMIN — SODIUM CHLORIDE, PRESERVATIVE FREE 10 ML: 5 INJECTION INTRAVENOUS at 21:03

## 2024-02-21 RX ADMIN — GABAPENTIN 300 MG: 300 CAPSULE ORAL at 09:37

## 2024-02-21 RX ADMIN — IPRATROPIUM BROMIDE AND ALBUTEROL SULFATE 1 DOSE: 2.5; .5 SOLUTION RESPIRATORY (INHALATION) at 08:24

## 2024-02-21 RX ADMIN — PANTOPRAZOLE SODIUM 40 MG: 40 TABLET, DELAYED RELEASE ORAL at 20:35

## 2024-02-21 RX ADMIN — IPRATROPIUM BROMIDE AND ALBUTEROL SULFATE 1 DOSE: 2.5; .5 SOLUTION RESPIRATORY (INHALATION) at 03:17

## 2024-02-21 RX ADMIN — GABAPENTIN 300 MG: 300 CAPSULE ORAL at 20:35

## 2024-02-21 RX ADMIN — HYDROXYZINE HYDROCHLORIDE 10 MG: 10 TABLET ORAL at 20:35

## 2024-02-21 RX ADMIN — IPRATROPIUM BROMIDE AND ALBUTEROL SULFATE 1 DOSE: 2.5; .5 SOLUTION RESPIRATORY (INHALATION) at 13:21

## 2024-02-21 RX ADMIN — ARFORMOTEROL TARTRATE: 15 SOLUTION RESPIRATORY (INHALATION) at 21:20

## 2024-02-21 RX ADMIN — GABAPENTIN 300 MG: 300 CAPSULE ORAL at 13:28

## 2024-02-21 RX ADMIN — GUAIFENESIN 600 MG: 600 TABLET, EXTENDED RELEASE ORAL at 20:35

## 2024-02-21 RX ADMIN — LEVOTHYROXINE SODIUM 88 MCG: 0.09 TABLET ORAL at 06:28

## 2024-02-21 RX ADMIN — ENOXAPARIN SODIUM 40 MG: 100 INJECTION SUBCUTANEOUS at 09:37

## 2024-02-21 RX ADMIN — WATER 80 MG: 1 INJECTION INTRAMUSCULAR; INTRAVENOUS; SUBCUTANEOUS at 06:29

## 2024-02-21 RX ADMIN — MONTELUKAST 10 MG: 10 TABLET, FILM COATED ORAL at 09:37

## 2024-02-21 RX ADMIN — IPRATROPIUM BROMIDE AND ALBUTEROL SULFATE 1 DOSE: 2.5; .5 SOLUTION RESPIRATORY (INHALATION) at 21:20

## 2024-02-21 RX ADMIN — GUAIFENESIN 600 MG: 600 TABLET, EXTENDED RELEASE ORAL at 09:37

## 2024-02-21 RX ADMIN — DOXYCYCLINE HYCLATE 100 MG: 100 TABLET, COATED ORAL at 20:35

## 2024-02-21 RX ADMIN — IPRATROPIUM BROMIDE AND ALBUTEROL SULFATE 1 DOSE: 2.5; .5 SOLUTION RESPIRATORY (INHALATION) at 15:57

## 2024-02-21 RX ADMIN — DOXYCYCLINE 100 MG: 100 INJECTION, POWDER, LYOPHILIZED, FOR SOLUTION INTRAVENOUS at 11:15

## 2024-02-21 RX ADMIN — ARFORMOTEROL TARTRATE: 15 SOLUTION RESPIRATORY (INHALATION) at 08:23

## 2024-02-22 ENCOUNTER — APPOINTMENT (OUTPATIENT)
Facility: HOSPITAL | Age: 79
DRG: 189 | End: 2024-02-22
Payer: MEDICARE

## 2024-02-22 LAB
ANION GAP SERPL CALC-SCNC: 8 MMOL/L (ref 5–15)
ARTERIAL PATENCY WRIST A: YES
BASE DEFICIT BLDA-SCNC: 1.8 MMOL/L
BASOPHILS # BLD: 0 K/UL (ref 0–0.1)
BASOPHILS NFR BLD: 0 % (ref 0–1)
BDY SITE: ABNORMAL
BUN SERPL-MCNC: 21 MG/DL (ref 6–20)
BUN/CREAT SERPL: 25 (ref 12–20)
CALCIUM SERPL-MCNC: 9.4 MG/DL (ref 8.5–10.1)
CHLORIDE SERPL-SCNC: 110 MMOL/L (ref 97–108)
CO2 SERPL-SCNC: 24 MMOL/L (ref 21–32)
CREAT SERPL-MCNC: 0.85 MG/DL (ref 0.55–1.02)
DIFFERENTIAL METHOD BLD: ABNORMAL
EOSINOPHIL # BLD: 0 K/UL (ref 0–0.4)
EOSINOPHIL NFR BLD: 0 % (ref 0–7)
ERYTHROCYTE [DISTWIDTH] IN BLOOD BY AUTOMATED COUNT: 13.7 % (ref 11.5–14.5)
GAS FLOW.O2 O2 DELIVERY SYS: 15 L/MIN
GLUCOSE SERPL-MCNC: 155 MG/DL (ref 65–100)
HCO3 BLDA-SCNC: 22 MMOL/L (ref 22–26)
HCT VFR BLD AUTO: 38.8 % (ref 35–47)
HGB BLD-MCNC: 12.9 G/DL (ref 11.5–16)
IMM GRANULOCYTES # BLD AUTO: 0.1 K/UL (ref 0–0.04)
IMM GRANULOCYTES NFR BLD AUTO: 1 % (ref 0–0.5)
LYMPHOCYTES # BLD: 0.4 K/UL (ref 0.8–3.5)
LYMPHOCYTES NFR BLD: 4 % (ref 12–49)
MCH RBC QN AUTO: 33.3 PG (ref 26–34)
MCHC RBC AUTO-ENTMCNC: 33.2 G/DL (ref 30–36.5)
MCV RBC AUTO: 100.3 FL (ref 80–99)
MONOCYTES # BLD: 0.5 K/UL (ref 0–1)
MONOCYTES NFR BLD: 5 % (ref 5–13)
NEUTS SEG # BLD: 9.2 K/UL (ref 1.8–8)
NEUTS SEG NFR BLD: 90 % (ref 32–75)
NRBC # BLD: 0 K/UL (ref 0–0.01)
NRBC BLD-RTO: 0 PER 100 WBC
PCO2 BLDA: 34 MMHG (ref 35–45)
PH BLDA: 7.43 (ref 7.35–7.45)
PLATELET # BLD AUTO: 175 K/UL (ref 150–400)
PMV BLD AUTO: 11.6 FL (ref 8.9–12.9)
PO2 BLDA: 66 MMHG (ref 80–100)
POTASSIUM SERPL-SCNC: 4.2 MMOL/L (ref 3.5–5.1)
RBC # BLD AUTO: 3.87 M/UL (ref 3.8–5.2)
RBC MORPH BLD: ABNORMAL
SAO2 % BLD: 94 % (ref 92–97)
SAO2% DEVICE SAO2% SENSOR NAME: ABNORMAL
SODIUM SERPL-SCNC: 142 MMOL/L (ref 136–145)
SPECIMEN SITE: ABNORMAL
WBC # BLD AUTO: 10.2 K/UL (ref 3.6–11)

## 2024-02-22 PROCEDURE — 87205 SMEAR GRAM STAIN: CPT

## 2024-02-22 PROCEDURE — 6370000000 HC RX 637 (ALT 250 FOR IP): Performed by: HOSPITALIST

## 2024-02-22 PROCEDURE — 85025 COMPLETE CBC W/AUTO DIFF WBC: CPT

## 2024-02-22 PROCEDURE — 2060000000 HC ICU INTERMEDIATE R&B

## 2024-02-22 PROCEDURE — 94760 N-INVAS EAR/PLS OXIMETRY 1: CPT

## 2024-02-22 PROCEDURE — 6360000002 HC RX W HCPCS: Performed by: STUDENT IN AN ORGANIZED HEALTH CARE EDUCATION/TRAINING PROGRAM

## 2024-02-22 PROCEDURE — 97530 THERAPEUTIC ACTIVITIES: CPT

## 2024-02-22 PROCEDURE — 6370000000 HC RX 637 (ALT 250 FOR IP): Performed by: STUDENT IN AN ORGANIZED HEALTH CARE EDUCATION/TRAINING PROGRAM

## 2024-02-22 PROCEDURE — 87070 CULTURE OTHR SPECIMN AEROBIC: CPT

## 2024-02-22 PROCEDURE — 2580000003 HC RX 258: Performed by: STUDENT IN AN ORGANIZED HEALTH CARE EDUCATION/TRAINING PROGRAM

## 2024-02-22 PROCEDURE — 94640 AIRWAY INHALATION TREATMENT: CPT

## 2024-02-22 PROCEDURE — 94664 DEMO&/EVAL PT USE INHALER: CPT

## 2024-02-22 PROCEDURE — 36600 WITHDRAWAL OF ARTERIAL BLOOD: CPT

## 2024-02-22 PROCEDURE — 82803 BLOOD GASES ANY COMBINATION: CPT

## 2024-02-22 PROCEDURE — 6370000000 HC RX 637 (ALT 250 FOR IP): Performed by: PHYSICIAN ASSISTANT

## 2024-02-22 PROCEDURE — 2700000000 HC OXYGEN THERAPY PER DAY

## 2024-02-22 PROCEDURE — 80048 BASIC METABOLIC PNL TOTAL CA: CPT

## 2024-02-22 PROCEDURE — 71045 X-RAY EXAM CHEST 1 VIEW: CPT

## 2024-02-22 PROCEDURE — 94010 BREATHING CAPACITY TEST: CPT

## 2024-02-22 PROCEDURE — 36415 COLL VENOUS BLD VENIPUNCTURE: CPT

## 2024-02-22 RX ORDER — FLUTICASONE PROPIONATE 50 MCG
1 SPRAY, SUSPENSION (ML) NASAL DAILY
Status: DISCONTINUED | OUTPATIENT
Start: 2024-02-22 | End: 2024-02-26

## 2024-02-22 RX ORDER — BENZONATATE 100 MG/1
200 CAPSULE ORAL 3 TIMES DAILY
Status: DISCONTINUED | OUTPATIENT
Start: 2024-02-22 | End: 2024-03-06 | Stop reason: HOSPADM

## 2024-02-22 RX ORDER — GUAIFENESIN 600 MG/1
1200 TABLET, EXTENDED RELEASE ORAL 2 TIMES DAILY
Status: DISCONTINUED | OUTPATIENT
Start: 2024-02-22 | End: 2024-03-06 | Stop reason: HOSPADM

## 2024-02-22 RX ORDER — LORAZEPAM 0.5 MG/1
0.5 TABLET ORAL EVERY 6 HOURS PRN
Status: DISCONTINUED | OUTPATIENT
Start: 2024-02-22 | End: 2024-03-06 | Stop reason: HOSPADM

## 2024-02-22 RX ORDER — CETIRIZINE HYDROCHLORIDE 10 MG/1
10 TABLET ORAL DAILY
Status: DISCONTINUED | OUTPATIENT
Start: 2024-02-22 | End: 2024-03-06 | Stop reason: HOSPADM

## 2024-02-22 RX ORDER — BENZONATATE 100 MG/1
100 CAPSULE ORAL 3 TIMES DAILY PRN
Status: DISCONTINUED | OUTPATIENT
Start: 2024-02-22 | End: 2024-02-22

## 2024-02-22 RX ORDER — AZELASTINE 1 MG/ML
1 SPRAY, METERED NASAL 2 TIMES DAILY
Status: DISCONTINUED | OUTPATIENT
Start: 2024-02-22 | End: 2024-03-06 | Stop reason: HOSPADM

## 2024-02-22 RX ADMIN — SODIUM CHLORIDE, PRESERVATIVE FREE 10 ML: 5 INJECTION INTRAVENOUS at 21:03

## 2024-02-22 RX ADMIN — IPRATROPIUM BROMIDE AND ALBUTEROL SULFATE 1 DOSE: 2.5; .5 SOLUTION RESPIRATORY (INHALATION) at 11:45

## 2024-02-22 RX ADMIN — AZELASTINE HYDROCHLORIDE 1 SPRAY: 137 SPRAY, METERED NASAL at 12:16

## 2024-02-22 RX ADMIN — PANTOPRAZOLE SODIUM 40 MG: 40 TABLET, DELAYED RELEASE ORAL at 21:03

## 2024-02-22 RX ADMIN — WATER 80 MG: 1 INJECTION INTRAMUSCULAR; INTRAVENOUS; SUBCUTANEOUS at 16:10

## 2024-02-22 RX ADMIN — ARFORMOTEROL TARTRATE: 15 SOLUTION RESPIRATORY (INHALATION) at 08:02

## 2024-02-22 RX ADMIN — LORAZEPAM 0.5 MG: 0.5 TABLET ORAL at 21:03

## 2024-02-22 RX ADMIN — GUAIFENESIN 1200 MG: 600 TABLET, EXTENDED RELEASE ORAL at 12:20

## 2024-02-22 RX ADMIN — GABAPENTIN 300 MG: 300 CAPSULE ORAL at 21:01

## 2024-02-22 RX ADMIN — FLUTICASONE PROPIONATE 1 SPRAY: 50 SPRAY, METERED NASAL at 12:20

## 2024-02-22 RX ADMIN — BENZONATATE 200 MG: 100 CAPSULE ORAL at 12:20

## 2024-02-22 RX ADMIN — IPRATROPIUM BROMIDE AND ALBUTEROL SULFATE 1 DOSE: 2.5; .5 SOLUTION RESPIRATORY (INHALATION) at 20:15

## 2024-02-22 RX ADMIN — DOXYCYCLINE HYCLATE 100 MG: 100 TABLET, COATED ORAL at 10:23

## 2024-02-22 RX ADMIN — ARFORMOTEROL TARTRATE: 15 SOLUTION RESPIRATORY (INHALATION) at 20:14

## 2024-02-22 RX ADMIN — WATER 80 MG: 1 INJECTION INTRAMUSCULAR; INTRAVENOUS; SUBCUTANEOUS at 06:25

## 2024-02-22 RX ADMIN — DOXYCYCLINE HYCLATE 100 MG: 100 TABLET, COATED ORAL at 21:00

## 2024-02-22 RX ADMIN — PANTOPRAZOLE SODIUM 40 MG: 40 TABLET, DELAYED RELEASE ORAL at 10:22

## 2024-02-22 RX ADMIN — GABAPENTIN 300 MG: 300 CAPSULE ORAL at 10:13

## 2024-02-22 RX ADMIN — ENOXAPARIN SODIUM 40 MG: 100 INJECTION SUBCUTANEOUS at 10:15

## 2024-02-22 RX ADMIN — CETIRIZINE HYDROCHLORIDE 10 MG: 10 TABLET, FILM COATED ORAL at 12:18

## 2024-02-22 RX ADMIN — LEVOTHYROXINE SODIUM 88 MCG: 0.09 TABLET ORAL at 06:25

## 2024-02-22 RX ADMIN — IPRATROPIUM BROMIDE AND ALBUTEROL SULFATE 1 DOSE: 2.5; .5 SOLUTION RESPIRATORY (INHALATION) at 15:55

## 2024-02-22 RX ADMIN — SODIUM CHLORIDE, PRESERVATIVE FREE 10 ML: 5 INJECTION INTRAVENOUS at 10:27

## 2024-02-22 RX ADMIN — AZELASTINE HYDROCHLORIDE 1 SPRAY: 137 SPRAY, METERED NASAL at 21:00

## 2024-02-22 RX ADMIN — LORAZEPAM 0.5 MG: 0.5 TABLET ORAL at 12:30

## 2024-02-22 RX ADMIN — MONTELUKAST 10 MG: 10 TABLET, FILM COATED ORAL at 12:18

## 2024-02-22 RX ADMIN — GUAIFENESIN 1200 MG: 600 TABLET, EXTENDED RELEASE ORAL at 21:01

## 2024-02-22 RX ADMIN — GABAPENTIN 300 MG: 300 CAPSULE ORAL at 13:45

## 2024-02-22 RX ADMIN — BENZONATATE 200 MG: 100 CAPSULE ORAL at 19:27

## 2024-02-22 RX ADMIN — IPRATROPIUM BROMIDE AND ALBUTEROL SULFATE 1 DOSE: 2.5; .5 SOLUTION RESPIRATORY (INHALATION) at 08:02

## 2024-02-22 ASSESSMENT — PULMONARY FUNCTION TESTS
PEFR_L/MIN: 90
POST BRONCHODILATOR FEV1/FVC: 73
PEFR_L/MIN: 96
FEV1 (%PREDICTED): 100

## 2024-02-22 ASSESSMENT — COPD QUESTIONNAIRES
QUESTION5_HOMEACTIVITIES: 1
QUESTION7_SLEEPQUALITY: 5
QUESTION2_CHESTPHLEGM: 4
QUESTION6_LEAVINGHOUSE: 0
QUESTION4_WALKINCLINE: 5
QUESTION8_ENERGYLEVEL: 3
QUESTION3_CHESTTIGHTNESS: 0
QUESTION1_COUGHFREQUENCY: 4
CAT_TOTALSCORE: 22

## 2024-02-22 ASSESSMENT — PAIN SCALES - GENERAL
PAINLEVEL_OUTOF10: 0
PAINLEVEL_OUTOF10: 0

## 2024-02-23 PROCEDURE — 6370000000 HC RX 637 (ALT 250 FOR IP): Performed by: STUDENT IN AN ORGANIZED HEALTH CARE EDUCATION/TRAINING PROGRAM

## 2024-02-23 PROCEDURE — 6370000000 HC RX 637 (ALT 250 FOR IP): Performed by: HOSPITALIST

## 2024-02-23 PROCEDURE — 6370000000 HC RX 637 (ALT 250 FOR IP): Performed by: PHYSICIAN ASSISTANT

## 2024-02-23 PROCEDURE — 6360000002 HC RX W HCPCS: Performed by: STUDENT IN AN ORGANIZED HEALTH CARE EDUCATION/TRAINING PROGRAM

## 2024-02-23 PROCEDURE — 2580000003 HC RX 258: Performed by: STUDENT IN AN ORGANIZED HEALTH CARE EDUCATION/TRAINING PROGRAM

## 2024-02-23 PROCEDURE — 6360000002 HC RX W HCPCS: Performed by: PHYSICIAN ASSISTANT

## 2024-02-23 PROCEDURE — 97535 SELF CARE MNGMENT TRAINING: CPT

## 2024-02-23 PROCEDURE — 2700000000 HC OXYGEN THERAPY PER DAY

## 2024-02-23 PROCEDURE — 2060000000 HC ICU INTERMEDIATE R&B

## 2024-02-23 PROCEDURE — 94760 N-INVAS EAR/PLS OXIMETRY 1: CPT

## 2024-02-23 PROCEDURE — 97530 THERAPEUTIC ACTIVITIES: CPT

## 2024-02-23 PROCEDURE — 94640 AIRWAY INHALATION TREATMENT: CPT

## 2024-02-23 PROCEDURE — 2580000003 HC RX 258: Performed by: PHYSICIAN ASSISTANT

## 2024-02-23 RX ORDER — IPRATROPIUM BROMIDE AND ALBUTEROL SULFATE 2.5; .5 MG/3ML; MG/3ML
1 SOLUTION RESPIRATORY (INHALATION)
Status: DISCONTINUED | OUTPATIENT
Start: 2024-02-24 | End: 2024-03-04

## 2024-02-23 RX ORDER — ALBUTEROL SULFATE 2.5 MG/3ML
2.5 SOLUTION RESPIRATORY (INHALATION) EVERY 6 HOURS PRN
Status: DISCONTINUED | OUTPATIENT
Start: 2024-02-23 | End: 2024-03-06 | Stop reason: HOSPADM

## 2024-02-23 RX ADMIN — AZELASTINE HYDROCHLORIDE 1 SPRAY: 137 SPRAY, METERED NASAL at 22:03

## 2024-02-23 RX ADMIN — CETIRIZINE HYDROCHLORIDE 10 MG: 10 TABLET, FILM COATED ORAL at 09:48

## 2024-02-23 RX ADMIN — ENOXAPARIN SODIUM 40 MG: 100 INJECTION SUBCUTANEOUS at 09:46

## 2024-02-23 RX ADMIN — GABAPENTIN 300 MG: 300 CAPSULE ORAL at 09:47

## 2024-02-23 RX ADMIN — BENZONATATE 200 MG: 100 CAPSULE ORAL at 09:47

## 2024-02-23 RX ADMIN — IPRATROPIUM BROMIDE AND ALBUTEROL SULFATE 1 DOSE: 2.5; .5 SOLUTION RESPIRATORY (INHALATION) at 07:54

## 2024-02-23 RX ADMIN — WATER 80 MG: 1 INJECTION INTRAMUSCULAR; INTRAVENOUS; SUBCUTANEOUS at 07:23

## 2024-02-23 RX ADMIN — GABAPENTIN 300 MG: 300 CAPSULE ORAL at 22:05

## 2024-02-23 RX ADMIN — IPRATROPIUM BROMIDE AND ALBUTEROL SULFATE 1 DOSE: 2.5; .5 SOLUTION RESPIRATORY (INHALATION) at 11:33

## 2024-02-23 RX ADMIN — BENZONATATE 200 MG: 100 CAPSULE ORAL at 22:03

## 2024-02-23 RX ADMIN — PANTOPRAZOLE SODIUM 40 MG: 40 TABLET, DELAYED RELEASE ORAL at 09:47

## 2024-02-23 RX ADMIN — ARFORMOTEROL TARTRATE: 15 SOLUTION RESPIRATORY (INHALATION) at 20:53

## 2024-02-23 RX ADMIN — AZELASTINE HYDROCHLORIDE 1 SPRAY: 137 SPRAY, METERED NASAL at 09:48

## 2024-02-23 RX ADMIN — LEVOTHYROXINE SODIUM 88 MCG: 0.09 TABLET ORAL at 07:24

## 2024-02-23 RX ADMIN — GABAPENTIN 300 MG: 300 CAPSULE ORAL at 15:19

## 2024-02-23 RX ADMIN — WATER 80 MG: 1 INJECTION INTRAMUSCULAR; INTRAVENOUS; SUBCUTANEOUS at 00:43

## 2024-02-23 RX ADMIN — FLUTICASONE PROPIONATE 1 SPRAY: 50 SPRAY, METERED NASAL at 09:49

## 2024-02-23 RX ADMIN — DOXYCYCLINE HYCLATE 100 MG: 100 TABLET, COATED ORAL at 09:48

## 2024-02-23 RX ADMIN — GUAIFENESIN 1200 MG: 600 TABLET, EXTENDED RELEASE ORAL at 22:03

## 2024-02-23 RX ADMIN — IPRATROPIUM BROMIDE AND ALBUTEROL SULFATE 1 DOSE: 2.5; .5 SOLUTION RESPIRATORY (INHALATION) at 20:53

## 2024-02-23 RX ADMIN — IPRATROPIUM BROMIDE AND ALBUTEROL SULFATE 1 DOSE: 2.5; .5 SOLUTION RESPIRATORY (INHALATION) at 17:14

## 2024-02-23 RX ADMIN — DOXYCYCLINE HYCLATE 100 MG: 100 TABLET, COATED ORAL at 22:05

## 2024-02-23 RX ADMIN — GUAIFENESIN 1200 MG: 600 TABLET, EXTENDED RELEASE ORAL at 09:46

## 2024-02-23 RX ADMIN — SODIUM CHLORIDE, PRESERVATIVE FREE 10 ML: 5 INJECTION INTRAVENOUS at 22:05

## 2024-02-23 RX ADMIN — ARFORMOTEROL TARTRATE: 15 SOLUTION RESPIRATORY (INHALATION) at 07:54

## 2024-02-23 RX ADMIN — BENZONATATE 200 MG: 100 CAPSULE ORAL at 17:41

## 2024-02-23 RX ADMIN — MONTELUKAST 10 MG: 10 TABLET, FILM COATED ORAL at 09:47

## 2024-02-23 RX ADMIN — PANTOPRAZOLE SODIUM 40 MG: 40 TABLET, DELAYED RELEASE ORAL at 22:05

## 2024-02-23 RX ADMIN — SODIUM CHLORIDE, PRESERVATIVE FREE 10 ML: 5 INJECTION INTRAVENOUS at 09:57

## 2024-02-23 RX ADMIN — WATER 40 MG: 1 INJECTION INTRAMUSCULAR; INTRAVENOUS; SUBCUTANEOUS at 22:32

## 2024-02-23 RX ADMIN — IPRATROPIUM BROMIDE AND ALBUTEROL SULFATE 1 DOSE: 2.5; .5 SOLUTION RESPIRATORY (INHALATION) at 00:38

## 2024-02-23 RX ADMIN — WATER 40 MG: 1 INJECTION INTRAMUSCULAR; INTRAVENOUS; SUBCUTANEOUS at 15:18

## 2024-02-23 RX ADMIN — IPRATROPIUM BROMIDE AND ALBUTEROL SULFATE 1 DOSE: 2.5; .5 SOLUTION RESPIRATORY (INHALATION) at 04:48

## 2024-02-23 ASSESSMENT — PAIN SCALES - GENERAL
PAINLEVEL_OUTOF10: 0
PAINLEVEL_OUTOF10: 0

## 2024-02-24 LAB
ALBUMIN SERPL-MCNC: 3 G/DL (ref 3.5–5)
ALBUMIN/GLOB SERPL: 0.9 (ref 1.1–2.2)
ALP SERPL-CCNC: 68 U/L (ref 45–117)
ALT SERPL-CCNC: 34 U/L (ref 12–78)
ANION GAP SERPL CALC-SCNC: 5 MMOL/L (ref 5–15)
AST SERPL-CCNC: 12 U/L (ref 15–37)
BACTERIA SPEC CULT: NORMAL
BASOPHILS # BLD: 0 K/UL (ref 0–0.1)
BASOPHILS NFR BLD: 0 % (ref 0–1)
BILIRUB SERPL-MCNC: 0.5 MG/DL (ref 0.2–1)
BUN SERPL-MCNC: 18 MG/DL (ref 6–20)
BUN/CREAT SERPL: 23 (ref 12–20)
CALCIUM SERPL-MCNC: 9.2 MG/DL (ref 8.5–10.1)
CHLORIDE SERPL-SCNC: 110 MMOL/L (ref 97–108)
CO2 SERPL-SCNC: 26 MMOL/L (ref 21–32)
CREAT SERPL-MCNC: 0.79 MG/DL (ref 0.55–1.02)
DIFFERENTIAL METHOD BLD: ABNORMAL
EOSINOPHIL # BLD: 0 K/UL (ref 0–0.4)
EOSINOPHIL NFR BLD: 0 % (ref 0–7)
ERYTHROCYTE [DISTWIDTH] IN BLOOD BY AUTOMATED COUNT: 13.2 % (ref 11.5–14.5)
GLOBULIN SER CALC-MCNC: 3.4 G/DL (ref 2–4)
GLUCOSE SERPL-MCNC: 131 MG/DL (ref 65–100)
GRAM STN SPEC: NORMAL
HCT VFR BLD AUTO: 39 % (ref 35–47)
HGB BLD-MCNC: 13.4 G/DL (ref 11.5–16)
IMM GRANULOCYTES # BLD AUTO: 0 K/UL
IMM GRANULOCYTES NFR BLD AUTO: 0 %
LYMPHOCYTES # BLD: 0.3 K/UL (ref 0.8–3.5)
LYMPHOCYTES NFR BLD: 3 % (ref 12–49)
MCH RBC QN AUTO: 33.6 PG (ref 26–34)
MCHC RBC AUTO-ENTMCNC: 34.4 G/DL (ref 30–36.5)
MCV RBC AUTO: 97.7 FL (ref 80–99)
MONOCYTES # BLD: 0.5 K/UL (ref 0–1)
MONOCYTES NFR BLD: 5 % (ref 5–13)
NEUTS SEG # BLD: 8.3 K/UL (ref 1.8–8)
NEUTS SEG NFR BLD: 92 % (ref 32–75)
NRBC # BLD: 0.02 K/UL (ref 0–0.01)
NRBC BLD-RTO: 0.2 PER 100 WBC
PLATELET # BLD AUTO: 198 K/UL (ref 150–400)
PMV BLD AUTO: 11.4 FL (ref 8.9–12.9)
POTASSIUM SERPL-SCNC: 4.2 MMOL/L (ref 3.5–5.1)
PROT SERPL-MCNC: 6.4 G/DL (ref 6.4–8.2)
RBC # BLD AUTO: 3.99 M/UL (ref 3.8–5.2)
RBC MORPH BLD: ABNORMAL
SERVICE CMNT-IMP: NORMAL
SODIUM SERPL-SCNC: 141 MMOL/L (ref 136–145)
WBC # BLD AUTO: 9.1 K/UL (ref 3.6–11)

## 2024-02-24 PROCEDURE — 6370000000 HC RX 637 (ALT 250 FOR IP): Performed by: PHYSICIAN ASSISTANT

## 2024-02-24 PROCEDURE — 6370000000 HC RX 637 (ALT 250 FOR IP): Performed by: HOSPITALIST

## 2024-02-24 PROCEDURE — 6370000000 HC RX 637 (ALT 250 FOR IP): Performed by: INTERNAL MEDICINE

## 2024-02-24 PROCEDURE — 94640 AIRWAY INHALATION TREATMENT: CPT

## 2024-02-24 PROCEDURE — 85025 COMPLETE CBC W/AUTO DIFF WBC: CPT

## 2024-02-24 PROCEDURE — 2700000000 HC OXYGEN THERAPY PER DAY

## 2024-02-24 PROCEDURE — 6370000000 HC RX 637 (ALT 250 FOR IP): Performed by: STUDENT IN AN ORGANIZED HEALTH CARE EDUCATION/TRAINING PROGRAM

## 2024-02-24 PROCEDURE — 6360000002 HC RX W HCPCS: Performed by: STUDENT IN AN ORGANIZED HEALTH CARE EDUCATION/TRAINING PROGRAM

## 2024-02-24 PROCEDURE — 2580000003 HC RX 258: Performed by: STUDENT IN AN ORGANIZED HEALTH CARE EDUCATION/TRAINING PROGRAM

## 2024-02-24 PROCEDURE — 2060000000 HC ICU INTERMEDIATE R&B

## 2024-02-24 PROCEDURE — 36415 COLL VENOUS BLD VENIPUNCTURE: CPT

## 2024-02-24 PROCEDURE — 6360000002 HC RX W HCPCS: Performed by: PHYSICIAN ASSISTANT

## 2024-02-24 PROCEDURE — 80053 COMPREHEN METABOLIC PANEL: CPT

## 2024-02-24 PROCEDURE — 2580000003 HC RX 258: Performed by: PHYSICIAN ASSISTANT

## 2024-02-24 RX ADMIN — WATER 40 MG: 1 INJECTION INTRAMUSCULAR; INTRAVENOUS; SUBCUTANEOUS at 21:32

## 2024-02-24 RX ADMIN — SODIUM CHLORIDE, PRESERVATIVE FREE 10 ML: 5 INJECTION INTRAVENOUS at 09:23

## 2024-02-24 RX ADMIN — IPRATROPIUM BROMIDE AND ALBUTEROL SULFATE 1 DOSE: 2.5; .5 SOLUTION RESPIRATORY (INHALATION) at 17:02

## 2024-02-24 RX ADMIN — ENOXAPARIN SODIUM 40 MG: 100 INJECTION SUBCUTANEOUS at 09:22

## 2024-02-24 RX ADMIN — CETIRIZINE HYDROCHLORIDE 10 MG: 10 TABLET, FILM COATED ORAL at 09:22

## 2024-02-24 RX ADMIN — ARFORMOTEROL TARTRATE: 15 SOLUTION RESPIRATORY (INHALATION) at 07:25

## 2024-02-24 RX ADMIN — BENZONATATE 200 MG: 100 CAPSULE ORAL at 21:31

## 2024-02-24 RX ADMIN — AZELASTINE HYDROCHLORIDE 1 SPRAY: 137 SPRAY, METERED NASAL at 21:30

## 2024-02-24 RX ADMIN — IPRATROPIUM BROMIDE AND ALBUTEROL SULFATE 1 DOSE: 2.5; .5 SOLUTION RESPIRATORY (INHALATION) at 11:11

## 2024-02-24 RX ADMIN — IPRATROPIUM BROMIDE AND ALBUTEROL SULFATE 1 DOSE: 2.5; .5 SOLUTION RESPIRATORY (INHALATION) at 21:45

## 2024-02-24 RX ADMIN — GABAPENTIN 300 MG: 300 CAPSULE ORAL at 21:31

## 2024-02-24 RX ADMIN — BENZONATATE 200 MG: 100 CAPSULE ORAL at 09:22

## 2024-02-24 RX ADMIN — ARFORMOTEROL TARTRATE: 15 SOLUTION RESPIRATORY (INHALATION) at 21:44

## 2024-02-24 RX ADMIN — MONTELUKAST 10 MG: 10 TABLET, FILM COATED ORAL at 09:22

## 2024-02-24 RX ADMIN — GUAIFENESIN 1200 MG: 600 TABLET, EXTENDED RELEASE ORAL at 21:32

## 2024-02-24 RX ADMIN — GABAPENTIN 300 MG: 300 CAPSULE ORAL at 14:43

## 2024-02-24 RX ADMIN — GUAIFENESIN 1200 MG: 600 TABLET, EXTENDED RELEASE ORAL at 09:21

## 2024-02-24 RX ADMIN — GABAPENTIN 300 MG: 300 CAPSULE ORAL at 09:22

## 2024-02-24 RX ADMIN — PANTOPRAZOLE SODIUM 40 MG: 40 TABLET, DELAYED RELEASE ORAL at 09:22

## 2024-02-24 RX ADMIN — AZELASTINE HYDROCHLORIDE 1 SPRAY: 137 SPRAY, METERED NASAL at 09:24

## 2024-02-24 RX ADMIN — WATER 40 MG: 1 INJECTION INTRAMUSCULAR; INTRAVENOUS; SUBCUTANEOUS at 07:10

## 2024-02-24 RX ADMIN — DOXYCYCLINE HYCLATE 100 MG: 100 TABLET, COATED ORAL at 09:21

## 2024-02-24 RX ADMIN — IPRATROPIUM BROMIDE AND ALBUTEROL SULFATE 1 DOSE: 2.5; .5 SOLUTION RESPIRATORY (INHALATION) at 07:25

## 2024-02-24 RX ADMIN — SODIUM CHLORIDE, PRESERVATIVE FREE 10 ML: 5 INJECTION INTRAVENOUS at 21:33

## 2024-02-24 RX ADMIN — FLUTICASONE PROPIONATE 1 SPRAY: 50 SPRAY, METERED NASAL at 09:24

## 2024-02-24 RX ADMIN — LEVOTHYROXINE SODIUM 88 MCG: 0.09 TABLET ORAL at 07:11

## 2024-02-24 RX ADMIN — PANTOPRAZOLE SODIUM 40 MG: 40 TABLET, DELAYED RELEASE ORAL at 21:33

## 2024-02-24 RX ADMIN — BENZONATATE 200 MG: 100 CAPSULE ORAL at 17:50

## 2024-02-24 RX ADMIN — WATER 40 MG: 1 INJECTION INTRAMUSCULAR; INTRAVENOUS; SUBCUTANEOUS at 14:43

## 2024-02-25 ENCOUNTER — APPOINTMENT (OUTPATIENT)
Facility: HOSPITAL | Age: 79
DRG: 189 | End: 2024-02-25
Payer: MEDICARE

## 2024-02-25 LAB
ALBUMIN SERPL-MCNC: 2.8 G/DL (ref 3.5–5)
ALBUMIN/GLOB SERPL: 0.9 (ref 1.1–2.2)
ALP SERPL-CCNC: 63 U/L (ref 45–117)
ALT SERPL-CCNC: 30 U/L (ref 12–78)
ANION GAP SERPL CALC-SCNC: 5 MMOL/L (ref 5–15)
AST SERPL-CCNC: 9 U/L (ref 15–37)
BASOPHILS # BLD: 0 K/UL (ref 0–0.1)
BASOPHILS NFR BLD: 0 % (ref 0–1)
BILIRUB SERPL-MCNC: 0.6 MG/DL (ref 0.2–1)
BUN SERPL-MCNC: 24 MG/DL (ref 6–20)
BUN/CREAT SERPL: 34 (ref 12–20)
CALCIUM SERPL-MCNC: 8.9 MG/DL (ref 8.5–10.1)
CHLORIDE SERPL-SCNC: 110 MMOL/L (ref 97–108)
CO2 SERPL-SCNC: 26 MMOL/L (ref 21–32)
CREAT SERPL-MCNC: 0.71 MG/DL (ref 0.55–1.02)
DIFFERENTIAL METHOD BLD: ABNORMAL
EOSINOPHIL # BLD: 0 K/UL (ref 0–0.4)
EOSINOPHIL NFR BLD: 0 % (ref 0–7)
ERYTHROCYTE [DISTWIDTH] IN BLOOD BY AUTOMATED COUNT: 13.2 % (ref 11.5–14.5)
GLOBULIN SER CALC-MCNC: 3.2 G/DL (ref 2–4)
GLUCOSE SERPL-MCNC: 137 MG/DL (ref 65–100)
HCT VFR BLD AUTO: 39.8 % (ref 35–47)
HGB BLD-MCNC: 13.6 G/DL (ref 11.5–16)
IMM GRANULOCYTES # BLD AUTO: 0 K/UL
IMM GRANULOCYTES NFR BLD AUTO: 0 %
LYMPHOCYTES # BLD: 0.9 K/UL (ref 0.8–3.5)
LYMPHOCYTES NFR BLD: 9 % (ref 12–49)
MCH RBC QN AUTO: 33.3 PG (ref 26–34)
MCHC RBC AUTO-ENTMCNC: 34.2 G/DL (ref 30–36.5)
MCV RBC AUTO: 97.3 FL (ref 80–99)
MONOCYTES # BLD: 0.4 K/UL (ref 0–1)
MONOCYTES NFR BLD: 4 % (ref 5–13)
NEUTS SEG # BLD: 8.4 K/UL (ref 1.8–8)
NEUTS SEG NFR BLD: 87 % (ref 32–75)
NRBC # BLD: 0 K/UL (ref 0–0.01)
NRBC BLD-RTO: 0 PER 100 WBC
PLATELET # BLD AUTO: 201 K/UL (ref 150–400)
PMV BLD AUTO: 11 FL (ref 8.9–12.9)
POTASSIUM SERPL-SCNC: 4.4 MMOL/L (ref 3.5–5.1)
PROT SERPL-MCNC: 6 G/DL (ref 6.4–8.2)
RBC # BLD AUTO: 4.09 M/UL (ref 3.8–5.2)
RBC MORPH BLD: ABNORMAL
SODIUM SERPL-SCNC: 141 MMOL/L (ref 136–145)
WBC # BLD AUTO: 9.7 K/UL (ref 3.6–11)

## 2024-02-25 PROCEDURE — 2580000003 HC RX 258: Performed by: STUDENT IN AN ORGANIZED HEALTH CARE EDUCATION/TRAINING PROGRAM

## 2024-02-25 PROCEDURE — 6360000002 HC RX W HCPCS: Performed by: STUDENT IN AN ORGANIZED HEALTH CARE EDUCATION/TRAINING PROGRAM

## 2024-02-25 PROCEDURE — 6370000000 HC RX 637 (ALT 250 FOR IP): Performed by: HOSPITALIST

## 2024-02-25 PROCEDURE — 6360000002 HC RX W HCPCS: Performed by: PHYSICIAN ASSISTANT

## 2024-02-25 PROCEDURE — 6370000000 HC RX 637 (ALT 250 FOR IP): Performed by: PHYSICIAN ASSISTANT

## 2024-02-25 PROCEDURE — 6370000000 HC RX 637 (ALT 250 FOR IP): Performed by: INTERNAL MEDICINE

## 2024-02-25 PROCEDURE — 2700000000 HC OXYGEN THERAPY PER DAY

## 2024-02-25 PROCEDURE — 94640 AIRWAY INHALATION TREATMENT: CPT

## 2024-02-25 PROCEDURE — 80053 COMPREHEN METABOLIC PANEL: CPT

## 2024-02-25 PROCEDURE — 36415 COLL VENOUS BLD VENIPUNCTURE: CPT

## 2024-02-25 PROCEDURE — 85025 COMPLETE CBC W/AUTO DIFF WBC: CPT

## 2024-02-25 PROCEDURE — 71045 X-RAY EXAM CHEST 1 VIEW: CPT

## 2024-02-25 PROCEDURE — 2580000003 HC RX 258: Performed by: PHYSICIAN ASSISTANT

## 2024-02-25 PROCEDURE — 2060000000 HC ICU INTERMEDIATE R&B

## 2024-02-25 PROCEDURE — 6370000000 HC RX 637 (ALT 250 FOR IP): Performed by: STUDENT IN AN ORGANIZED HEALTH CARE EDUCATION/TRAINING PROGRAM

## 2024-02-25 RX ORDER — GUAIFENESIN 200 MG/10ML
200 LIQUID ORAL EVERY 4 HOURS PRN
Status: DISCONTINUED | OUTPATIENT
Start: 2024-02-25 | End: 2024-03-06 | Stop reason: HOSPADM

## 2024-02-25 RX ADMIN — GABAPENTIN 300 MG: 300 CAPSULE ORAL at 21:14

## 2024-02-25 RX ADMIN — BENZONATATE 200 MG: 100 CAPSULE ORAL at 21:14

## 2024-02-25 RX ADMIN — GUAIFENESIN 1200 MG: 600 TABLET, EXTENDED RELEASE ORAL at 09:25

## 2024-02-25 RX ADMIN — MONTELUKAST 10 MG: 10 TABLET, FILM COATED ORAL at 09:25

## 2024-02-25 RX ADMIN — ENOXAPARIN SODIUM 40 MG: 100 INJECTION SUBCUTANEOUS at 09:25

## 2024-02-25 RX ADMIN — GUAIFENESIN 1200 MG: 600 TABLET, EXTENDED RELEASE ORAL at 21:14

## 2024-02-25 RX ADMIN — CETIRIZINE HYDROCHLORIDE 10 MG: 10 TABLET, FILM COATED ORAL at 09:25

## 2024-02-25 RX ADMIN — LEVOTHYROXINE SODIUM 88 MCG: 0.09 TABLET ORAL at 07:15

## 2024-02-25 RX ADMIN — IPRATROPIUM BROMIDE AND ALBUTEROL SULFATE 1 DOSE: 2.5; .5 SOLUTION RESPIRATORY (INHALATION) at 21:28

## 2024-02-25 RX ADMIN — SODIUM CHLORIDE, PRESERVATIVE FREE 10 ML: 5 INJECTION INTRAVENOUS at 21:15

## 2024-02-25 RX ADMIN — FLUTICASONE PROPIONATE 1 SPRAY: 50 SPRAY, METERED NASAL at 09:25

## 2024-02-25 RX ADMIN — BENZONATATE 200 MG: 100 CAPSULE ORAL at 18:24

## 2024-02-25 RX ADMIN — GABAPENTIN 300 MG: 300 CAPSULE ORAL at 14:50

## 2024-02-25 RX ADMIN — ARFORMOTEROL TARTRATE: 15 SOLUTION RESPIRATORY (INHALATION) at 07:46

## 2024-02-25 RX ADMIN — ARFORMOTEROL TARTRATE: 15 SOLUTION RESPIRATORY (INHALATION) at 21:27

## 2024-02-25 RX ADMIN — AZELASTINE HYDROCHLORIDE 1 SPRAY: 137 SPRAY, METERED NASAL at 21:12

## 2024-02-25 RX ADMIN — IPRATROPIUM BROMIDE AND ALBUTEROL SULFATE 1 DOSE: 2.5; .5 SOLUTION RESPIRATORY (INHALATION) at 17:08

## 2024-02-25 RX ADMIN — SODIUM CHLORIDE, PRESERVATIVE FREE 10 ML: 5 INJECTION INTRAVENOUS at 09:28

## 2024-02-25 RX ADMIN — WATER 40 MG: 1 INJECTION INTRAMUSCULAR; INTRAVENOUS; SUBCUTANEOUS at 21:12

## 2024-02-25 RX ADMIN — PANTOPRAZOLE SODIUM 40 MG: 40 TABLET, DELAYED RELEASE ORAL at 21:15

## 2024-02-25 RX ADMIN — WATER 40 MG: 1 INJECTION INTRAMUSCULAR; INTRAVENOUS; SUBCUTANEOUS at 14:50

## 2024-02-25 RX ADMIN — IPRATROPIUM BROMIDE AND ALBUTEROL SULFATE 1 DOSE: 2.5; .5 SOLUTION RESPIRATORY (INHALATION) at 11:23

## 2024-02-25 RX ADMIN — AZELASTINE HYDROCHLORIDE 1 SPRAY: 137 SPRAY, METERED NASAL at 09:25

## 2024-02-25 RX ADMIN — PANTOPRAZOLE SODIUM 40 MG: 40 TABLET, DELAYED RELEASE ORAL at 09:25

## 2024-02-25 RX ADMIN — BENZONATATE 200 MG: 100 CAPSULE ORAL at 09:25

## 2024-02-25 RX ADMIN — IPRATROPIUM BROMIDE AND ALBUTEROL SULFATE 1 DOSE: 2.5; .5 SOLUTION RESPIRATORY (INHALATION) at 07:46

## 2024-02-25 RX ADMIN — WATER 40 MG: 1 INJECTION INTRAMUSCULAR; INTRAVENOUS; SUBCUTANEOUS at 07:15

## 2024-02-25 RX ADMIN — GABAPENTIN 300 MG: 300 CAPSULE ORAL at 09:25

## 2024-02-26 LAB
ARTERIAL PATENCY WRIST A: YES
BASE EXCESS BLDA CALC-SCNC: 1.9 MMOL/L
BDY SITE: ABNORMAL
CA-I BLD-SCNC: 1.2 MMOL/L (ref 1.13–1.32)
GAS FLOW.O2 SETTING OXYMISER: 11
HCO3 BLDA-SCNC: 25 MMOL/L (ref 22–26)
PCO2 BLDA: 35 MMHG (ref 35–45)
PH BLDA: 7.48 (ref 7.35–7.45)
PO2 BLDA: 50 MMHG (ref 80–100)
SAO2 % BLD: 87 % (ref 92–97)
SAO2% DEVICE SAO2% SENSOR NAME: ABNORMAL
SPECIMEN SITE: ABNORMAL

## 2024-02-26 PROCEDURE — 6360000002 HC RX W HCPCS: Performed by: STUDENT IN AN ORGANIZED HEALTH CARE EDUCATION/TRAINING PROGRAM

## 2024-02-26 PROCEDURE — 6370000000 HC RX 637 (ALT 250 FOR IP): Performed by: INTERNAL MEDICINE

## 2024-02-26 PROCEDURE — 2700000000 HC OXYGEN THERAPY PER DAY

## 2024-02-26 PROCEDURE — 6370000000 HC RX 637 (ALT 250 FOR IP): Performed by: STUDENT IN AN ORGANIZED HEALTH CARE EDUCATION/TRAINING PROGRAM

## 2024-02-26 PROCEDURE — 82330 ASSAY OF CALCIUM: CPT

## 2024-02-26 PROCEDURE — 2060000000 HC ICU INTERMEDIATE R&B

## 2024-02-26 PROCEDURE — 2580000003 HC RX 258: Performed by: STUDENT IN AN ORGANIZED HEALTH CARE EDUCATION/TRAINING PROGRAM

## 2024-02-26 PROCEDURE — 2580000003 HC RX 258: Performed by: PHYSICIAN ASSISTANT

## 2024-02-26 PROCEDURE — 82803 BLOOD GASES ANY COMBINATION: CPT

## 2024-02-26 PROCEDURE — 94640 AIRWAY INHALATION TREATMENT: CPT

## 2024-02-26 PROCEDURE — 6360000002 HC RX W HCPCS: Performed by: INTERNAL MEDICINE

## 2024-02-26 PROCEDURE — 97535 SELF CARE MNGMENT TRAINING: CPT

## 2024-02-26 PROCEDURE — 6370000000 HC RX 637 (ALT 250 FOR IP): Performed by: PHYSICIAN ASSISTANT

## 2024-02-26 PROCEDURE — 2580000003 HC RX 258: Performed by: INTERNAL MEDICINE

## 2024-02-26 PROCEDURE — 6370000000 HC RX 637 (ALT 250 FOR IP): Performed by: HOSPITALIST

## 2024-02-26 PROCEDURE — 94760 N-INVAS EAR/PLS OXIMETRY 1: CPT

## 2024-02-26 PROCEDURE — 2500000003 HC RX 250 WO HCPCS: Performed by: INTERNAL MEDICINE

## 2024-02-26 PROCEDURE — 36600 WITHDRAWAL OF ARTERIAL BLOOD: CPT

## 2024-02-26 PROCEDURE — 6360000002 HC RX W HCPCS: Performed by: PHYSICIAN ASSISTANT

## 2024-02-26 PROCEDURE — 97530 THERAPEUTIC ACTIVITIES: CPT

## 2024-02-26 RX ORDER — FUROSEMIDE 40 MG/1
20 TABLET ORAL DAILY
Status: DISCONTINUED | OUTPATIENT
Start: 2024-02-26 | End: 2024-03-06 | Stop reason: HOSPADM

## 2024-02-26 RX ADMIN — GABAPENTIN 300 MG: 300 CAPSULE ORAL at 14:00

## 2024-02-26 RX ADMIN — GABAPENTIN 300 MG: 300 CAPSULE ORAL at 08:34

## 2024-02-26 RX ADMIN — SODIUM CHLORIDE, PRESERVATIVE FREE 10 ML: 5 INJECTION INTRAVENOUS at 20:57

## 2024-02-26 RX ADMIN — BENZONATATE 200 MG: 100 CAPSULE ORAL at 20:55

## 2024-02-26 RX ADMIN — IPRATROPIUM BROMIDE AND ALBUTEROL SULFATE 1 DOSE: 2.5; .5 SOLUTION RESPIRATORY (INHALATION) at 21:15

## 2024-02-26 RX ADMIN — GABAPENTIN 300 MG: 300 CAPSULE ORAL at 20:54

## 2024-02-26 RX ADMIN — WATER 40 MG: 1 INJECTION INTRAMUSCULAR; INTRAVENOUS; SUBCUTANEOUS at 07:08

## 2024-02-26 RX ADMIN — ENOXAPARIN SODIUM 40 MG: 100 INJECTION SUBCUTANEOUS at 08:35

## 2024-02-26 RX ADMIN — CETIRIZINE HYDROCHLORIDE 10 MG: 10 TABLET, FILM COATED ORAL at 08:35

## 2024-02-26 RX ADMIN — MONTELUKAST 10 MG: 10 TABLET, FILM COATED ORAL at 08:35

## 2024-02-26 RX ADMIN — LEVOTHYROXINE SODIUM 88 MCG: 0.09 TABLET ORAL at 07:08

## 2024-02-26 RX ADMIN — GUAIFENESIN 200 MG: 200 SOLUTION ORAL at 10:08

## 2024-02-26 RX ADMIN — BENZONATATE 200 MG: 100 CAPSULE ORAL at 08:34

## 2024-02-26 RX ADMIN — AZELASTINE HYDROCHLORIDE 1 SPRAY: 137 SPRAY, METERED NASAL at 20:56

## 2024-02-26 RX ADMIN — BENZONATATE 200 MG: 100 CAPSULE ORAL at 17:48

## 2024-02-26 RX ADMIN — GUAIFENESIN 1200 MG: 600 TABLET, EXTENDED RELEASE ORAL at 20:54

## 2024-02-26 RX ADMIN — FLUTICASONE PROPIONATE 1 SPRAY: 50 SPRAY, METERED NASAL at 08:37

## 2024-02-26 RX ADMIN — GUAIFENESIN 1200 MG: 600 TABLET, EXTENDED RELEASE ORAL at 08:34

## 2024-02-26 RX ADMIN — IPRATROPIUM BROMIDE AND ALBUTEROL SULFATE 1 DOSE: 2.5; .5 SOLUTION RESPIRATORY (INHALATION) at 15:35

## 2024-02-26 RX ADMIN — ARFORMOTEROL TARTRATE: 15 SOLUTION RESPIRATORY (INHALATION) at 07:40

## 2024-02-26 RX ADMIN — SODIUM CHLORIDE, PRESERVATIVE FREE 10 ML: 5 INJECTION INTRAVENOUS at 08:36

## 2024-02-26 RX ADMIN — ARFORMOTEROL TARTRATE: 15 SOLUTION RESPIRATORY (INHALATION) at 21:15

## 2024-02-26 RX ADMIN — AZELASTINE HYDROCHLORIDE 1 SPRAY: 137 SPRAY, METERED NASAL at 08:36

## 2024-02-26 RX ADMIN — IPRATROPIUM BROMIDE AND ALBUTEROL SULFATE 1 DOSE: 2.5; .5 SOLUTION RESPIRATORY (INHALATION) at 11:34

## 2024-02-26 RX ADMIN — PANTOPRAZOLE SODIUM 40 MG: 40 TABLET, DELAYED RELEASE ORAL at 20:54

## 2024-02-26 RX ADMIN — GUAIFENESIN 200 MG: 200 SOLUTION ORAL at 19:03

## 2024-02-26 RX ADMIN — PANTOPRAZOLE SODIUM 40 MG: 40 TABLET, DELAYED RELEASE ORAL at 08:35

## 2024-02-26 RX ADMIN — SALINE NASAL SPRAY 2 SPRAY: 1.5 SOLUTION NASAL at 12:38

## 2024-02-26 RX ADMIN — WATER 40 MG: 1 INJECTION INTRAMUSCULAR; INTRAVENOUS; SUBCUTANEOUS at 18:46

## 2024-02-26 RX ADMIN — IPRATROPIUM BROMIDE AND ALBUTEROL SULFATE 1 DOSE: 2.5; .5 SOLUTION RESPIRATORY (INHALATION) at 07:40

## 2024-02-26 RX ADMIN — FUROSEMIDE 20 MG: 40 TABLET ORAL at 12:37

## 2024-02-26 ASSESSMENT — PAIN SCALES - GENERAL: PAINLEVEL_OUTOF10: 0

## 2024-02-26 NOTE — CARE COORDINATION
Transition of Care Plan:    The patient is on 12L HF 02. Noted, patient pays out of pocket for home 02. The patient plans to discharge home with [facility] when stable to discharge. Family to transport.    RUR: Calculating N/A  Prior Level of Functioning: Independent, drives   Disposition: Home with    Follow up appointments: Per attending's recommendations  DME needed: None  Transportation at discharge: Family  IM/IMM Medicare/ letter given: 2/19/24  Is patient a [place] and connected with VA?No    If yes, was [place] transfer form completed and VA notified?   Caregiver Contact: Daughter in law: Emelia Morse: [phone]  Discharge Caregiver contacted prior to discharge? Per attending's recommendations  Care Conference needed? None   Barriers to discharge: None    Demetra Delvalle RN/CRM  [phone]

## 2024-02-27 ENCOUNTER — APPOINTMENT (OUTPATIENT)
Facility: HOSPITAL | Age: 79
DRG: 189 | End: 2024-02-27
Attending: INTERNAL MEDICINE
Payer: MEDICARE

## 2024-02-27 ENCOUNTER — APPOINTMENT (OUTPATIENT)
Facility: HOSPITAL | Age: 79
DRG: 189 | End: 2024-02-27
Payer: MEDICARE

## 2024-02-27 LAB
ECHO AV AREA PEAK VELOCITY: 0.9 CM2
ECHO AV AREA VTI: 0.5 CM2
ECHO AV AREA/BSA PEAK VELOCITY: 0.5 CM2/M2
ECHO AV AREA/BSA VTI: 0.3 CM2/M2
ECHO AV MEAN GRADIENT: 22 MMHG
ECHO AV MEAN VELOCITY: 2.2 M/S
ECHO AV PEAK GRADIENT: 34 MMHG
ECHO AV PEAK VELOCITY: 2.9 M/S
ECHO AV VELOCITY RATIO: 0.24
ECHO AV VTI: 90.2 CM
ECHO BSA: 1.79 M2
ECHO LVOT AREA: 3.8 CM2
ECHO LVOT AV VTI INDEX: 0.14
ECHO LVOT DIAM: 2.2 CM
ECHO LVOT MEAN GRADIENT: 1 MMHG
ECHO LVOT PEAK GRADIENT: 2 MMHG
ECHO LVOT PEAK VELOCITY: 0.7 M/S
ECHO LVOT STROKE VOLUME INDEX: 27.9 ML/M2
ECHO LVOT SV: 48.6 ML
ECHO LVOT VTI: 12.8 CM
ECHO MV A VELOCITY: 0.74 M/S
ECHO MV AREA PHT: 1.9 CM2
ECHO MV E DECELERATION TIME (DT): 390.8 MS
ECHO MV E VELOCITY: 0.44 M/S
ECHO MV E/A RATIO: 0.59
ECHO MV PRESSURE HALF TIME (PHT): 113.3 MS

## 2024-02-27 PROCEDURE — 71045 X-RAY EXAM CHEST 1 VIEW: CPT

## 2024-02-27 PROCEDURE — 97535 SELF CARE MNGMENT TRAINING: CPT

## 2024-02-27 PROCEDURE — 6360000004 HC RX CONTRAST MEDICATION: Performed by: INTERNAL MEDICINE

## 2024-02-27 PROCEDURE — 6370000000 HC RX 637 (ALT 250 FOR IP): Performed by: INTERNAL MEDICINE

## 2024-02-27 PROCEDURE — 2060000000 HC ICU INTERMEDIATE R&B

## 2024-02-27 PROCEDURE — 2500000003 HC RX 250 WO HCPCS: Performed by: INTERNAL MEDICINE

## 2024-02-27 PROCEDURE — 2580000003 HC RX 258: Performed by: PHYSICIAN ASSISTANT

## 2024-02-27 PROCEDURE — 6360000002 HC RX W HCPCS: Performed by: PHYSICIAN ASSISTANT

## 2024-02-27 PROCEDURE — 6360000002 HC RX W HCPCS: Performed by: STUDENT IN AN ORGANIZED HEALTH CARE EDUCATION/TRAINING PROGRAM

## 2024-02-27 PROCEDURE — 94760 N-INVAS EAR/PLS OXIMETRY 1: CPT

## 2024-02-27 PROCEDURE — 6370000000 HC RX 637 (ALT 250 FOR IP): Performed by: HOSPITALIST

## 2024-02-27 PROCEDURE — 2580000003 HC RX 258: Performed by: STUDENT IN AN ORGANIZED HEALTH CARE EDUCATION/TRAINING PROGRAM

## 2024-02-27 PROCEDURE — 2700000000 HC OXYGEN THERAPY PER DAY

## 2024-02-27 PROCEDURE — 94640 AIRWAY INHALATION TREATMENT: CPT

## 2024-02-27 PROCEDURE — 6370000000 HC RX 637 (ALT 250 FOR IP): Performed by: STUDENT IN AN ORGANIZED HEALTH CARE EDUCATION/TRAINING PROGRAM

## 2024-02-27 PROCEDURE — 6370000000 HC RX 637 (ALT 250 FOR IP): Performed by: PHYSICIAN ASSISTANT

## 2024-02-27 PROCEDURE — 6360000002 HC RX W HCPCS: Performed by: INTERNAL MEDICINE

## 2024-02-27 PROCEDURE — 2580000003 HC RX 258: Performed by: INTERNAL MEDICINE

## 2024-02-27 PROCEDURE — C8924 2D TTE W OR W/O FOL W/CON,FU: HCPCS

## 2024-02-27 RX ADMIN — WATER 30 MG: 1 INJECTION INTRAMUSCULAR; INTRAVENOUS; SUBCUTANEOUS at 19:06

## 2024-02-27 RX ADMIN — IPRATROPIUM BROMIDE AND ALBUTEROL SULFATE 1 DOSE: 2.5; .5 SOLUTION RESPIRATORY (INHALATION) at 07:31

## 2024-02-27 RX ADMIN — IPRATROPIUM BROMIDE AND ALBUTEROL SULFATE 1 DOSE: 2.5; .5 SOLUTION RESPIRATORY (INHALATION) at 12:24

## 2024-02-27 RX ADMIN — BENZONATATE 200 MG: 100 CAPSULE ORAL at 18:07

## 2024-02-27 RX ADMIN — FUROSEMIDE 20 MG: 40 TABLET ORAL at 08:36

## 2024-02-27 RX ADMIN — AZELASTINE HYDROCHLORIDE 1 SPRAY: 137 SPRAY, METERED NASAL at 08:43

## 2024-02-27 RX ADMIN — ENOXAPARIN SODIUM 40 MG: 100 INJECTION SUBCUTANEOUS at 08:41

## 2024-02-27 RX ADMIN — GUAIFENESIN 200 MG: 200 SOLUTION ORAL at 05:00

## 2024-02-27 RX ADMIN — PANTOPRAZOLE SODIUM 40 MG: 40 TABLET, DELAYED RELEASE ORAL at 21:27

## 2024-02-27 RX ADMIN — GUAIFENESIN 1200 MG: 600 TABLET, EXTENDED RELEASE ORAL at 08:36

## 2024-02-27 RX ADMIN — AZELASTINE HYDROCHLORIDE 1 SPRAY: 137 SPRAY, METERED NASAL at 21:28

## 2024-02-27 RX ADMIN — CETIRIZINE HYDROCHLORIDE 10 MG: 10 TABLET, FILM COATED ORAL at 08:36

## 2024-02-27 RX ADMIN — IPRATROPIUM BROMIDE AND ALBUTEROL SULFATE 1 DOSE: 2.5; .5 SOLUTION RESPIRATORY (INHALATION) at 16:22

## 2024-02-27 RX ADMIN — GABAPENTIN 300 MG: 300 CAPSULE ORAL at 13:19

## 2024-02-27 RX ADMIN — LEVOTHYROXINE SODIUM 88 MCG: 0.09 TABLET ORAL at 06:39

## 2024-02-27 RX ADMIN — BENZONATATE 200 MG: 100 CAPSULE ORAL at 21:27

## 2024-02-27 RX ADMIN — GABAPENTIN 300 MG: 300 CAPSULE ORAL at 21:27

## 2024-02-27 RX ADMIN — GUAIFENESIN 200 MG: 200 SOLUTION ORAL at 13:18

## 2024-02-27 RX ADMIN — POLYETHYLENE GLYCOL 3350 17 G: 17 POWDER, FOR SOLUTION ORAL at 10:56

## 2024-02-27 RX ADMIN — IPRATROPIUM BROMIDE AND ALBUTEROL SULFATE 1 DOSE: 2.5; .5 SOLUTION RESPIRATORY (INHALATION) at 21:52

## 2024-02-27 RX ADMIN — BENZONATATE 200 MG: 100 CAPSULE ORAL at 08:36

## 2024-02-27 RX ADMIN — PANTOPRAZOLE SODIUM 40 MG: 40 TABLET, DELAYED RELEASE ORAL at 08:36

## 2024-02-27 RX ADMIN — WATER 40 MG: 1 INJECTION INTRAMUSCULAR; INTRAVENOUS; SUBCUTANEOUS at 06:38

## 2024-02-27 RX ADMIN — ARFORMOTEROL TARTRATE: 15 SOLUTION RESPIRATORY (INHALATION) at 21:52

## 2024-02-27 RX ADMIN — SODIUM CHLORIDE, PRESERVATIVE FREE 10 ML: 5 INJECTION INTRAVENOUS at 21:27

## 2024-02-27 RX ADMIN — ARFORMOTEROL TARTRATE: 15 SOLUTION RESPIRATORY (INHALATION) at 07:31

## 2024-02-27 RX ADMIN — GABAPENTIN 300 MG: 300 CAPSULE ORAL at 08:37

## 2024-02-27 RX ADMIN — MONTELUKAST 10 MG: 10 TABLET, FILM COATED ORAL at 08:36

## 2024-02-27 RX ADMIN — SODIUM CHLORIDE, PRESERVATIVE FREE 10 ML: 5 INJECTION INTRAVENOUS at 08:42

## 2024-02-27 RX ADMIN — PERFLUTREN 1.5 ML: 6.52 INJECTION, SUSPENSION INTRAVENOUS at 10:41

## 2024-02-27 RX ADMIN — GUAIFENESIN 1200 MG: 600 TABLET, EXTENDED RELEASE ORAL at 21:26

## 2024-02-27 ASSESSMENT — PAIN SCALES - GENERAL
PAINLEVEL_OUTOF10: 0

## 2024-02-28 PROBLEM — R06.02 SHORTNESS OF BREATH: Status: ACTIVE | Noted: 2024-02-28

## 2024-02-28 PROBLEM — R09.02 HYPOXIA: Status: ACTIVE | Noted: 2024-02-28

## 2024-02-28 PROBLEM — J96.21 ACUTE ON CHRONIC RESPIRATORY FAILURE WITH HYPOXIA (HCC): Status: ACTIVE | Noted: 2024-02-28

## 2024-02-28 PROBLEM — Z51.5 PALLIATIVE CARE BY SPECIALIST: Status: ACTIVE | Noted: 2024-02-28

## 2024-02-28 LAB
ANION GAP SERPL CALC-SCNC: 7 MMOL/L (ref 5–15)
BUN SERPL-MCNC: 32 MG/DL (ref 6–20)
BUN/CREAT SERPL: 43 (ref 12–20)
CALCIUM SERPL-MCNC: 8.7 MG/DL (ref 8.5–10.1)
CHLORIDE SERPL-SCNC: 107 MMOL/L (ref 97–108)
CO2 SERPL-SCNC: 27 MMOL/L (ref 21–32)
CREAT SERPL-MCNC: 0.75 MG/DL (ref 0.55–1.02)
ERYTHROCYTE [DISTWIDTH] IN BLOOD BY AUTOMATED COUNT: 13 % (ref 11.5–14.5)
GLUCOSE SERPL-MCNC: 175 MG/DL (ref 65–100)
HCT VFR BLD AUTO: 41.5 % (ref 35–47)
HGB BLD-MCNC: 13.8 G/DL (ref 11.5–16)
MAGNESIUM SERPL-MCNC: 3 MG/DL (ref 1.6–2.4)
MCH RBC QN AUTO: 32.9 PG (ref 26–34)
MCHC RBC AUTO-ENTMCNC: 33.3 G/DL (ref 30–36.5)
MCV RBC AUTO: 98.8 FL (ref 80–99)
NRBC # BLD: 0 K/UL (ref 0–0.01)
NRBC BLD-RTO: 0 PER 100 WBC
PHOSPHATE SERPL-MCNC: 3.7 MG/DL (ref 2.6–4.7)
PLATELET # BLD AUTO: 207 K/UL (ref 150–400)
PMV BLD AUTO: 11.3 FL (ref 8.9–12.9)
POTASSIUM SERPL-SCNC: 4.6 MMOL/L (ref 3.5–5.1)
RBC # BLD AUTO: 4.2 M/UL (ref 3.8–5.2)
SODIUM SERPL-SCNC: 141 MMOL/L (ref 136–145)
WBC # BLD AUTO: 11.1 K/UL (ref 3.6–11)

## 2024-02-28 PROCEDURE — 83735 ASSAY OF MAGNESIUM: CPT

## 2024-02-28 PROCEDURE — 80048 BASIC METABOLIC PNL TOTAL CA: CPT

## 2024-02-28 PROCEDURE — 6360000002 HC RX W HCPCS: Performed by: STUDENT IN AN ORGANIZED HEALTH CARE EDUCATION/TRAINING PROGRAM

## 2024-02-28 PROCEDURE — 2580000003 HC RX 258: Performed by: STUDENT IN AN ORGANIZED HEALTH CARE EDUCATION/TRAINING PROGRAM

## 2024-02-28 PROCEDURE — 99222 1ST HOSP IP/OBS MODERATE 55: CPT | Performed by: INTERNAL MEDICINE

## 2024-02-28 PROCEDURE — 84100 ASSAY OF PHOSPHORUS: CPT

## 2024-02-28 PROCEDURE — 6360000002 HC RX W HCPCS: Performed by: PHYSICIAN ASSISTANT

## 2024-02-28 PROCEDURE — 6370000000 HC RX 637 (ALT 250 FOR IP): Performed by: HOSPITALIST

## 2024-02-28 PROCEDURE — 6370000000 HC RX 637 (ALT 250 FOR IP): Performed by: PHYSICIAN ASSISTANT

## 2024-02-28 PROCEDURE — 6370000000 HC RX 637 (ALT 250 FOR IP): Performed by: STUDENT IN AN ORGANIZED HEALTH CARE EDUCATION/TRAINING PROGRAM

## 2024-02-28 PROCEDURE — 36415 COLL VENOUS BLD VENIPUNCTURE: CPT

## 2024-02-28 PROCEDURE — 85027 COMPLETE CBC AUTOMATED: CPT

## 2024-02-28 PROCEDURE — 2060000000 HC ICU INTERMEDIATE R&B

## 2024-02-28 PROCEDURE — 94760 N-INVAS EAR/PLS OXIMETRY 1: CPT

## 2024-02-28 PROCEDURE — 6370000000 HC RX 637 (ALT 250 FOR IP): Performed by: INTERNAL MEDICINE

## 2024-02-28 PROCEDURE — 2580000003 HC RX 258: Performed by: PHYSICIAN ASSISTANT

## 2024-02-28 PROCEDURE — 94640 AIRWAY INHALATION TREATMENT: CPT

## 2024-02-28 PROCEDURE — 97110 THERAPEUTIC EXERCISES: CPT

## 2024-02-28 PROCEDURE — 97535 SELF CARE MNGMENT TRAINING: CPT

## 2024-02-28 PROCEDURE — 97530 THERAPEUTIC ACTIVITIES: CPT

## 2024-02-28 PROCEDURE — 2700000000 HC OXYGEN THERAPY PER DAY

## 2024-02-28 RX ORDER — PREDNISONE 20 MG/1
40 TABLET ORAL DAILY
Status: DISCONTINUED | OUTPATIENT
Start: 2024-02-29 | End: 2024-03-06 | Stop reason: HOSPADM

## 2024-02-28 RX ADMIN — PANTOPRAZOLE SODIUM 40 MG: 40 TABLET, DELAYED RELEASE ORAL at 22:25

## 2024-02-28 RX ADMIN — WATER 30 MG: 1 INJECTION INTRAMUSCULAR; INTRAVENOUS; SUBCUTANEOUS at 06:47

## 2024-02-28 RX ADMIN — FUROSEMIDE 20 MG: 40 TABLET ORAL at 09:40

## 2024-02-28 RX ADMIN — GUAIFENESIN 1200 MG: 600 TABLET, EXTENDED RELEASE ORAL at 22:25

## 2024-02-28 RX ADMIN — IPRATROPIUM BROMIDE AND ALBUTEROL SULFATE 1 DOSE: 2.5; .5 SOLUTION RESPIRATORY (INHALATION) at 12:23

## 2024-02-28 RX ADMIN — PANTOPRAZOLE SODIUM 40 MG: 40 TABLET, DELAYED RELEASE ORAL at 09:40

## 2024-02-28 RX ADMIN — BENZONATATE 200 MG: 100 CAPSULE ORAL at 22:25

## 2024-02-28 RX ADMIN — ARFORMOTEROL TARTRATE: 15 SOLUTION RESPIRATORY (INHALATION) at 20:50

## 2024-02-28 RX ADMIN — SODIUM CHLORIDE, PRESERVATIVE FREE 10 ML: 5 INJECTION INTRAVENOUS at 09:44

## 2024-02-28 RX ADMIN — BENZONATATE 200 MG: 100 CAPSULE ORAL at 17:06

## 2024-02-28 RX ADMIN — AZELASTINE HYDROCHLORIDE 1 SPRAY: 137 SPRAY, METERED NASAL at 09:44

## 2024-02-28 RX ADMIN — SODIUM CHLORIDE, PRESERVATIVE FREE 10 ML: 5 INJECTION INTRAVENOUS at 22:25

## 2024-02-28 RX ADMIN — BENZONATATE 200 MG: 100 CAPSULE ORAL at 09:40

## 2024-02-28 RX ADMIN — WATER 30 MG: 1 INJECTION INTRAMUSCULAR; INTRAVENOUS; SUBCUTANEOUS at 19:12

## 2024-02-28 RX ADMIN — IPRATROPIUM BROMIDE AND ALBUTEROL SULFATE 1 DOSE: 2.5; .5 SOLUTION RESPIRATORY (INHALATION) at 20:50

## 2024-02-28 RX ADMIN — CETIRIZINE HYDROCHLORIDE 10 MG: 10 TABLET, FILM COATED ORAL at 09:40

## 2024-02-28 RX ADMIN — ENOXAPARIN SODIUM 40 MG: 100 INJECTION SUBCUTANEOUS at 09:40

## 2024-02-28 RX ADMIN — IPRATROPIUM BROMIDE AND ALBUTEROL SULFATE 1 DOSE: 2.5; .5 SOLUTION RESPIRATORY (INHALATION) at 07:26

## 2024-02-28 RX ADMIN — IPRATROPIUM BROMIDE AND ALBUTEROL SULFATE 1 DOSE: 2.5; .5 SOLUTION RESPIRATORY (INHALATION) at 16:37

## 2024-02-28 RX ADMIN — ARFORMOTEROL TARTRATE: 15 SOLUTION RESPIRATORY (INHALATION) at 07:26

## 2024-02-28 RX ADMIN — GABAPENTIN 300 MG: 300 CAPSULE ORAL at 09:40

## 2024-02-28 RX ADMIN — LEVOTHYROXINE SODIUM 88 MCG: 0.09 TABLET ORAL at 06:48

## 2024-02-28 RX ADMIN — AZELASTINE HYDROCHLORIDE 1 SPRAY: 137 SPRAY, METERED NASAL at 22:25

## 2024-02-28 RX ADMIN — MONTELUKAST 10 MG: 10 TABLET, FILM COATED ORAL at 09:40

## 2024-02-28 RX ADMIN — GUAIFENESIN 1200 MG: 600 TABLET, EXTENDED RELEASE ORAL at 09:40

## 2024-02-28 RX ADMIN — GABAPENTIN 300 MG: 300 CAPSULE ORAL at 21:30

## 2024-02-28 RX ADMIN — GABAPENTIN 300 MG: 300 CAPSULE ORAL at 14:46

## 2024-02-28 NOTE — CARE COORDINATION
Transition Of Care:    The patient is on 9L HF 02. Noted, patient pays out of pocket for home 02. The patient has her own concentrator with 6L of 02 as max limit and 2 portable units that go up to 3L of 02. The patient plans to discharge home with [facility] when stable to discharge. Family to transport.     RUR: 18%  Prior Level of Functioning: Independent, drives   Disposition: Home with HC  Follow up appointments: Per attending's recommendations  DME needed: None  Transportation at discharge: Family  IM/IMM Medicare/ letter given: 2/19/24  Is patient a  and connected with VA?No               If yes, was [place] transfer form completed and VA notified?   Caregiver Contact: Daughter in law: Emelia Morse: [phone]  Discharge Caregiver contacted prior to discharge? Per attending's recommendations  Care Conference needed? None   Barriers to discharge: None     Demetra Delvalle RN/CRM  [phone]

## 2024-02-29 LAB
ANION GAP SERPL CALC-SCNC: 9 MMOL/L (ref 5–15)
BUN SERPL-MCNC: 32 MG/DL (ref 6–20)
BUN/CREAT SERPL: 39 (ref 12–20)
CALCIUM SERPL-MCNC: 8.5 MG/DL (ref 8.5–10.1)
CHLORIDE SERPL-SCNC: 105 MMOL/L (ref 97–108)
CO2 SERPL-SCNC: 26 MMOL/L (ref 21–32)
CREAT SERPL-MCNC: 0.83 MG/DL (ref 0.55–1.02)
ERYTHROCYTE [DISTWIDTH] IN BLOOD BY AUTOMATED COUNT: 13.1 % (ref 11.5–14.5)
GLUCOSE SERPL-MCNC: 176 MG/DL (ref 65–100)
HCT VFR BLD AUTO: 41.3 % (ref 35–47)
HGB BLD-MCNC: 13.7 G/DL (ref 11.5–16)
MAGNESIUM SERPL-MCNC: 2.6 MG/DL (ref 1.6–2.4)
MCH RBC QN AUTO: 32.5 PG (ref 26–34)
MCHC RBC AUTO-ENTMCNC: 33.2 G/DL (ref 30–36.5)
MCV RBC AUTO: 97.9 FL (ref 80–99)
NRBC # BLD: 0 K/UL (ref 0–0.01)
NRBC BLD-RTO: 0 PER 100 WBC
PHOSPHATE SERPL-MCNC: 4 MG/DL (ref 2.6–4.7)
PLATELET # BLD AUTO: 188 K/UL (ref 150–400)
PMV BLD AUTO: 11.8 FL (ref 8.9–12.9)
POTASSIUM SERPL-SCNC: 4.4 MMOL/L (ref 3.5–5.1)
RBC # BLD AUTO: 4.22 M/UL (ref 3.8–5.2)
SODIUM SERPL-SCNC: 140 MMOL/L (ref 136–145)
WBC # BLD AUTO: 10.8 K/UL (ref 3.6–11)

## 2024-02-29 PROCEDURE — 6370000000 HC RX 637 (ALT 250 FOR IP): Performed by: FAMILY MEDICINE

## 2024-02-29 PROCEDURE — 83735 ASSAY OF MAGNESIUM: CPT

## 2024-02-29 PROCEDURE — 2700000000 HC OXYGEN THERAPY PER DAY

## 2024-02-29 PROCEDURE — 80048 BASIC METABOLIC PNL TOTAL CA: CPT

## 2024-02-29 PROCEDURE — 36415 COLL VENOUS BLD VENIPUNCTURE: CPT

## 2024-02-29 PROCEDURE — 6370000000 HC RX 637 (ALT 250 FOR IP): Performed by: PHYSICIAN ASSISTANT

## 2024-02-29 PROCEDURE — 85027 COMPLETE CBC AUTOMATED: CPT

## 2024-02-29 PROCEDURE — 94760 N-INVAS EAR/PLS OXIMETRY 1: CPT

## 2024-02-29 PROCEDURE — 97530 THERAPEUTIC ACTIVITIES: CPT

## 2024-02-29 PROCEDURE — 97110 THERAPEUTIC EXERCISES: CPT

## 2024-02-29 PROCEDURE — 6370000000 HC RX 637 (ALT 250 FOR IP): Performed by: INTERNAL MEDICINE

## 2024-02-29 PROCEDURE — 97116 GAIT TRAINING THERAPY: CPT

## 2024-02-29 PROCEDURE — 6370000000 HC RX 637 (ALT 250 FOR IP): Performed by: STUDENT IN AN ORGANIZED HEALTH CARE EDUCATION/TRAINING PROGRAM

## 2024-02-29 PROCEDURE — 94640 AIRWAY INHALATION TREATMENT: CPT

## 2024-02-29 PROCEDURE — 84100 ASSAY OF PHOSPHORUS: CPT

## 2024-02-29 PROCEDURE — 6360000002 HC RX W HCPCS: Performed by: STUDENT IN AN ORGANIZED HEALTH CARE EDUCATION/TRAINING PROGRAM

## 2024-02-29 PROCEDURE — 2580000003 HC RX 258: Performed by: STUDENT IN AN ORGANIZED HEALTH CARE EDUCATION/TRAINING PROGRAM

## 2024-02-29 PROCEDURE — 97535 SELF CARE MNGMENT TRAINING: CPT

## 2024-02-29 PROCEDURE — 2060000000 HC ICU INTERMEDIATE R&B

## 2024-02-29 PROCEDURE — 6360000002 HC RX W HCPCS: Performed by: INTERNAL MEDICINE

## 2024-02-29 PROCEDURE — 6370000000 HC RX 637 (ALT 250 FOR IP): Performed by: HOSPITALIST

## 2024-02-29 RX ORDER — CALCIUM CARBONATE 500 MG/1
500 TABLET, CHEWABLE ORAL 3 TIMES DAILY PRN
Status: DISCONTINUED | OUTPATIENT
Start: 2024-02-29 | End: 2024-03-06 | Stop reason: HOSPADM

## 2024-02-29 RX ADMIN — BENZONATATE 200 MG: 100 CAPSULE ORAL at 17:19

## 2024-02-29 RX ADMIN — ENOXAPARIN SODIUM 40 MG: 100 INJECTION SUBCUTANEOUS at 09:05

## 2024-02-29 RX ADMIN — IPRATROPIUM BROMIDE AND ALBUTEROL SULFATE 1 DOSE: 2.5; .5 SOLUTION RESPIRATORY (INHALATION) at 11:17

## 2024-02-29 RX ADMIN — GABAPENTIN 300 MG: 300 CAPSULE ORAL at 15:03

## 2024-02-29 RX ADMIN — SODIUM CHLORIDE, PRESERVATIVE FREE 10 ML: 5 INJECTION INTRAVENOUS at 21:22

## 2024-02-29 RX ADMIN — GABAPENTIN 300 MG: 300 CAPSULE ORAL at 09:05

## 2024-02-29 RX ADMIN — LEVOTHYROXINE SODIUM 88 MCG: 0.09 TABLET ORAL at 06:34

## 2024-02-29 RX ADMIN — BENZONATATE 200 MG: 100 CAPSULE ORAL at 21:23

## 2024-02-29 RX ADMIN — ARFORMOTEROL TARTRATE: 15 SOLUTION RESPIRATORY (INHALATION) at 21:27

## 2024-02-29 RX ADMIN — PREDNISONE 40 MG: 20 TABLET ORAL at 09:05

## 2024-02-29 RX ADMIN — BENZONATATE 200 MG: 100 CAPSULE ORAL at 09:05

## 2024-02-29 RX ADMIN — AZELASTINE HYDROCHLORIDE 1 SPRAY: 137 SPRAY, METERED NASAL at 21:22

## 2024-02-29 RX ADMIN — IPRATROPIUM BROMIDE AND ALBUTEROL SULFATE 1 DOSE: 2.5; .5 SOLUTION RESPIRATORY (INHALATION) at 07:59

## 2024-02-29 RX ADMIN — ARFORMOTEROL TARTRATE: 15 SOLUTION RESPIRATORY (INHALATION) at 07:59

## 2024-02-29 RX ADMIN — AZELASTINE HYDROCHLORIDE 1 SPRAY: 137 SPRAY, METERED NASAL at 09:06

## 2024-02-29 RX ADMIN — IPRATROPIUM BROMIDE AND ALBUTEROL SULFATE 1 DOSE: 2.5; .5 SOLUTION RESPIRATORY (INHALATION) at 16:11

## 2024-02-29 RX ADMIN — GUAIFENESIN 1200 MG: 600 TABLET, EXTENDED RELEASE ORAL at 21:23

## 2024-02-29 RX ADMIN — IPRATROPIUM BROMIDE AND ALBUTEROL SULFATE 1 DOSE: 2.5; .5 SOLUTION RESPIRATORY (INHALATION) at 21:27

## 2024-02-29 RX ADMIN — ALBUTEROL SULFATE 2.5 MG: 2.5 SOLUTION RESPIRATORY (INHALATION) at 01:44

## 2024-02-29 RX ADMIN — GUAIFENESIN 1200 MG: 600 TABLET, EXTENDED RELEASE ORAL at 09:06

## 2024-02-29 RX ADMIN — PANTOPRAZOLE SODIUM 40 MG: 40 TABLET, DELAYED RELEASE ORAL at 21:24

## 2024-02-29 RX ADMIN — FUROSEMIDE 20 MG: 40 TABLET ORAL at 09:05

## 2024-02-29 RX ADMIN — CETIRIZINE HYDROCHLORIDE 10 MG: 10 TABLET, FILM COATED ORAL at 09:06

## 2024-02-29 RX ADMIN — CALCIUM CARBONATE (ANTACID) CHEW TAB 500 MG 500 MG: 500 CHEW TAB at 15:51

## 2024-02-29 RX ADMIN — SODIUM CHLORIDE, PRESERVATIVE FREE 10 ML: 5 INJECTION INTRAVENOUS at 09:11

## 2024-02-29 RX ADMIN — PANTOPRAZOLE SODIUM 40 MG: 40 TABLET, DELAYED RELEASE ORAL at 09:06

## 2024-02-29 RX ADMIN — MONTELUKAST 10 MG: 10 TABLET, FILM COATED ORAL at 09:05

## 2024-02-29 RX ADMIN — GABAPENTIN 300 MG: 300 CAPSULE ORAL at 21:24

## 2024-02-29 ASSESSMENT — PAIN SCALES - GENERAL
PAINLEVEL_OUTOF10: 0
PAINLEVEL_OUTOF10: 0

## 2024-02-29 NOTE — CARE COORDINATION
Transition Of Care:    SNF referrals pending. Pulmonary, PT/OT following. The patient is currently on 6 L 02.  Insurance auth will be needed with [employer]. CM called to initiate auth and was unable to get through. CM will attempt again to start auth in the morning. Facility acceptance and choice by patient pending.  1st choice: [facility]  2nd chocie: [facility]  3rd choice: [facility]  Noted, patient pays out of pocket for home 02. The patient has her own concentrator with 6L of 02 as max limit and 2 portable units that go up to 3L of 02. The patient plans to discharge home with [facility] when stable to discharge. Transport TBD. Likely [facility] with oxygen.       Previous plan was home with [facility]. They accepted on 2/7/24.    RUR: 18%  Prior Level of Functioning: Independent, drives   Disposition: SNF vs [facility]  If SNF or IPR: Date FOC offered: 2/29/24  Date FOC received: 2/29/24  Accepting facility: Pending   Date authorization started with reference number: Pending  Date authorization received and expires: Pending   Follow up appointments: Per attending's recommendations  DME needed: None  Transportation at discharge: Family  IM/IMM Medicare/ letter given: 2/19/24  Is patient a  and connected with VA?No               If yes, was [place] transfer form completed and VA notified?   Caregiver Contact: Daughter in law: Emelia Morse: [phone]  Discharge Caregiver contacted prior to discharge? Per attending's recommendations  Care Conference needed? None   Barriers to discharge: None     Demetra Delvalle RN/CRM  [phone]

## 2024-03-01 PROCEDURE — 6370000000 HC RX 637 (ALT 250 FOR IP): Performed by: STUDENT IN AN ORGANIZED HEALTH CARE EDUCATION/TRAINING PROGRAM

## 2024-03-01 PROCEDURE — 2700000000 HC OXYGEN THERAPY PER DAY

## 2024-03-01 PROCEDURE — 6360000002 HC RX W HCPCS: Performed by: STUDENT IN AN ORGANIZED HEALTH CARE EDUCATION/TRAINING PROGRAM

## 2024-03-01 PROCEDURE — 2060000000 HC ICU INTERMEDIATE R&B

## 2024-03-01 PROCEDURE — 97535 SELF CARE MNGMENT TRAINING: CPT

## 2024-03-01 PROCEDURE — 94761 N-INVAS EAR/PLS OXIMETRY MLT: CPT

## 2024-03-01 PROCEDURE — 6370000000 HC RX 637 (ALT 250 FOR IP): Performed by: HOSPITALIST

## 2024-03-01 PROCEDURE — 94640 AIRWAY INHALATION TREATMENT: CPT

## 2024-03-01 PROCEDURE — 6370000000 HC RX 637 (ALT 250 FOR IP): Performed by: INTERNAL MEDICINE

## 2024-03-01 PROCEDURE — 97530 THERAPEUTIC ACTIVITIES: CPT

## 2024-03-01 PROCEDURE — 2580000003 HC RX 258: Performed by: STUDENT IN AN ORGANIZED HEALTH CARE EDUCATION/TRAINING PROGRAM

## 2024-03-01 PROCEDURE — 97116 GAIT TRAINING THERAPY: CPT

## 2024-03-01 PROCEDURE — 97110 THERAPEUTIC EXERCISES: CPT

## 2024-03-01 PROCEDURE — 6370000000 HC RX 637 (ALT 250 FOR IP): Performed by: PHYSICIAN ASSISTANT

## 2024-03-01 RX ADMIN — AZELASTINE HYDROCHLORIDE 1 SPRAY: 137 SPRAY, METERED NASAL at 21:10

## 2024-03-01 RX ADMIN — FUROSEMIDE 20 MG: 40 TABLET ORAL at 09:02

## 2024-03-01 RX ADMIN — MONTELUKAST 10 MG: 10 TABLET, FILM COATED ORAL at 09:01

## 2024-03-01 RX ADMIN — LEVOTHYROXINE SODIUM 88 MCG: 0.09 TABLET ORAL at 07:19

## 2024-03-01 RX ADMIN — PANTOPRAZOLE SODIUM 40 MG: 40 TABLET, DELAYED RELEASE ORAL at 21:10

## 2024-03-01 RX ADMIN — SODIUM CHLORIDE, PRESERVATIVE FREE 10 ML: 5 INJECTION INTRAVENOUS at 21:11

## 2024-03-01 RX ADMIN — BENZONATATE 200 MG: 100 CAPSULE ORAL at 19:06

## 2024-03-01 RX ADMIN — ARFORMOTEROL TARTRATE: 15 SOLUTION RESPIRATORY (INHALATION) at 08:29

## 2024-03-01 RX ADMIN — AZELASTINE HYDROCHLORIDE 1 SPRAY: 137 SPRAY, METERED NASAL at 09:05

## 2024-03-01 RX ADMIN — BENZONATATE 200 MG: 100 CAPSULE ORAL at 09:01

## 2024-03-01 RX ADMIN — ARFORMOTEROL TARTRATE: 15 SOLUTION RESPIRATORY (INHALATION) at 20:44

## 2024-03-01 RX ADMIN — GABAPENTIN 300 MG: 300 CAPSULE ORAL at 21:10

## 2024-03-01 RX ADMIN — GUAIFENESIN 1200 MG: 600 TABLET, EXTENDED RELEASE ORAL at 09:02

## 2024-03-01 RX ADMIN — ENOXAPARIN SODIUM 40 MG: 100 INJECTION SUBCUTANEOUS at 09:00

## 2024-03-01 RX ADMIN — IPRATROPIUM BROMIDE AND ALBUTEROL SULFATE 1 DOSE: 2.5; .5 SOLUTION RESPIRATORY (INHALATION) at 20:44

## 2024-03-01 RX ADMIN — IPRATROPIUM BROMIDE AND ALBUTEROL SULFATE 1 DOSE: 2.5; .5 SOLUTION RESPIRATORY (INHALATION) at 16:31

## 2024-03-01 RX ADMIN — GUAIFENESIN 1200 MG: 600 TABLET, EXTENDED RELEASE ORAL at 21:10

## 2024-03-01 RX ADMIN — IPRATROPIUM BROMIDE AND ALBUTEROL SULFATE 1 DOSE: 2.5; .5 SOLUTION RESPIRATORY (INHALATION) at 08:30

## 2024-03-01 RX ADMIN — CETIRIZINE HYDROCHLORIDE 10 MG: 10 TABLET, FILM COATED ORAL at 09:01

## 2024-03-01 RX ADMIN — GABAPENTIN 300 MG: 300 CAPSULE ORAL at 14:56

## 2024-03-01 RX ADMIN — IPRATROPIUM BROMIDE AND ALBUTEROL SULFATE 1 DOSE: 2.5; .5 SOLUTION RESPIRATORY (INHALATION) at 11:42

## 2024-03-01 RX ADMIN — GABAPENTIN 300 MG: 300 CAPSULE ORAL at 09:02

## 2024-03-01 RX ADMIN — PANTOPRAZOLE SODIUM 40 MG: 40 TABLET, DELAYED RELEASE ORAL at 09:02

## 2024-03-01 RX ADMIN — PREDNISONE 40 MG: 20 TABLET ORAL at 09:02

## 2024-03-01 RX ADMIN — BENZONATATE 200 MG: 100 CAPSULE ORAL at 21:10

## 2024-03-01 ASSESSMENT — PAIN SCALES - GENERAL
PAINLEVEL_OUTOF10: 0
PAINLEVEL_OUTOF10: 0

## 2024-03-01 NOTE — CARE COORDINATION
Transition Of Care:    SNF referrals pending. Auth initiated today 3/1/24 with Adrian nix Varun. Ref# [id number].  Therapy recommending IPR. MD in agreement with SNF. Transport TBD.    SNF's   [facility] - pending  [facility] - pending  [facility] - out of network  [facility] - out of network  [facility] - out of network  [facility] - out of network    RUR: 18%  Prior Level of Functioning: Independent, drives   Disposition: SNF vs [facility]  If SNF or IPR: Date FOC offered: 2/29/24  Date FOC received: 2/29/24  Accepting facility: Pending   Date authorization started with reference number: Pending  Date authorization received and expires: Pending   Follow up appointments: Per attending's recommendations  DME needed: None  Transportation at discharge: Family  IM/IMM Medicare/ letter given: 2/19/24  Is patient a  and connected with VA?No               If yes, was  transfer form completed and VA notified?   Caregiver Contact: Daughter in law: Emelia Morse: [phone]  Discharge Caregiver contacted prior to discharge? Per attending's recommendations  Care Conference needed? None   Barriers to discharge: None     Demetra Delvalle RN/CRM  [phone]

## 2024-03-01 NOTE — CONSULTS
PULMONARY/CRITICAL CARE/SLEEP MEDICINE    Initial Physician Consultation Note    Name: Milly Morse   : 1945   MRN: [id number]   Date: 2024      Subjective:   Consult Note: 2024     This patient has been seen and evaluated at the request of Dr. Woodruff for High-flow; SOB . Medical records and data reviewed.  Patient is a 78 y.o. female who presented to the hospital with complaints of shotness of breath and cough. Patient has been afebrile since admission.  Cough is persistent.  She has required escalation in oxygen requirement that is slowly now weaning down.  She has underlying history of combined pulmonary fibrosis and emphysema and is followed by Dr. Chicas in our practice.  She has had outpatient visits and failed to respond to outpatient therapy requiring hospital admission.  She has been receiving bronchodilators and high-dose Solu-Medrol.  She has been prescribed Ofev in the past but has not been able to take it for 2 months.  Shipment is expected in her home today and an order was entered for not on formulary patient supplied medications.  CT scan of the chest shows severe basilar fibrotic change and apical emphysematous change.  Historical echocardiograms had not should not shown presence of pulmonary hypertension, echocardiogram that was done in 2024 had shown normal right ventricular size and function with no evidence of pulmonary hypertension.  Microdata from this admission showed negative viral panel.  Sputum culture has not been checked.      Assessment:   Acute on chronic hypoxic respiratory failure  Exacerbated interstitial lung disease, underlying combined pulmonary fibrosis emphysema, on antifibrotic therapy at 100 mg twice daily of Ofev given side effects and GI intolerance from the higher dose  Family history of IPF  GERD With history of Nissen fundoplication in 2022  Medical problems per chart    Recommendations:   On steroids  Check sputum 
Palliative Medicine    Patient making slow gains.  Goals clear for attending rehab at time of discharge.  Will sign off   Please call as needed [phone]  
for severity on nonverbal patients):   Clinical Pain Assessment  Severity: 0       NVPS:       RDOS:         Vital Signs: Blood pressure 128/68, pulse 89, temperature 97.9 °F (36.6 °C), temperature source Oral, resp. rate 21, height 1.549 m (5' 1\"), weight 74.4 kg (164 lb 0.4 oz), SpO2 93 %.    PHYSICAL ASSESSMENT:   General: [x] Oriented x3  [] Well appearing  [] Intubated  []Ill appearing  []Other:  Mental Status: [x] Normal mental status exam  [] Drowsy  [] Confused  []Other:  Cardiovascular: [] Regular rate/rhythm  [] Arrhythmia  [] Other:  Chest: [] Effort normal  []Lungs clear  [x] Respiratory distress  [x]Tachypnea  [x] Other:increase work of breathing with conversation  Abdomen: [] Soft/non-tender  [] Normal appearance  [] Distended  [] Ascites  [] Other:  Neurological: [x] Normal speech  [] Normal sensation  []Deficits present:  Extremity: [] Normal skin color/temp  [] Clubbing/cyanosis  [] No edema  [] Other:    Wt Readings from Last 15 Encounters:   02/27/24 74.4 kg (164 lb 0.4 oz)   02/08/24 75.9 kg (167 lb 4 oz)   02/05/24 72 kg (158 lb 11.7 oz)        Current Diet: ADULT DIET; Regular       PSYCHOSOCIAL/SPIRITUAL SCREENING:   Palliative IDT has assessed this patient for cultural preferences / practices and a referral made as appropriate to needs (Cultural Services, Patient Advocacy, Ethics, etc.)    Spiritual Affiliation: Hoahaoism    Any spiritual / Orthodoxy concerns:  [] Yes /  [x] No   If \"Yes\" to discuss with pastoral care during IDT     Does caregiver feel burdened by caring for their loved one:   [] Yes /  [x] No /  [] No Caregiver Present/Available [] No Caregiver [] Pt Lives at Facility  If \"Yes\" to discuss with social work during IDT    Anticipatory grief assessment:   [x] Normal  / [] Maladaptive     If \"Maladaptive\" to discuss with social work during IDT    ESAS Anxiety: Anxiety Score: Not anxious    ESAS Depression: Depression Score: Not depressed        LAB AND IMAGING FINDINGS:

## 2024-03-02 LAB
ANION GAP SERPL CALC-SCNC: 3 MMOL/L (ref 5–15)
BUN SERPL-MCNC: 25 MG/DL (ref 6–20)
BUN/CREAT SERPL: 32 (ref 12–20)
CALCIUM SERPL-MCNC: 8.7 MG/DL (ref 8.5–10.1)
CHLORIDE SERPL-SCNC: 105 MMOL/L (ref 97–108)
CO2 SERPL-SCNC: 28 MMOL/L (ref 21–32)
CREAT SERPL-MCNC: 0.78 MG/DL (ref 0.55–1.02)
GLUCOSE SERPL-MCNC: 101 MG/DL (ref 65–100)
POTASSIUM SERPL-SCNC: 3.8 MMOL/L (ref 3.5–5.1)
SODIUM SERPL-SCNC: 136 MMOL/L (ref 136–145)

## 2024-03-02 PROCEDURE — 6370000000 HC RX 637 (ALT 250 FOR IP): Performed by: STUDENT IN AN ORGANIZED HEALTH CARE EDUCATION/TRAINING PROGRAM

## 2024-03-02 PROCEDURE — 2060000000 HC ICU INTERMEDIATE R&B

## 2024-03-02 PROCEDURE — 94760 N-INVAS EAR/PLS OXIMETRY 1: CPT

## 2024-03-02 PROCEDURE — 6360000002 HC RX W HCPCS: Performed by: STUDENT IN AN ORGANIZED HEALTH CARE EDUCATION/TRAINING PROGRAM

## 2024-03-02 PROCEDURE — 6370000000 HC RX 637 (ALT 250 FOR IP): Performed by: HOSPITALIST

## 2024-03-02 PROCEDURE — 6370000000 HC RX 637 (ALT 250 FOR IP): Performed by: INTERNAL MEDICINE

## 2024-03-02 PROCEDURE — 80048 BASIC METABOLIC PNL TOTAL CA: CPT

## 2024-03-02 PROCEDURE — 36415 COLL VENOUS BLD VENIPUNCTURE: CPT

## 2024-03-02 PROCEDURE — 2700000000 HC OXYGEN THERAPY PER DAY

## 2024-03-02 PROCEDURE — 2580000003 HC RX 258: Performed by: STUDENT IN AN ORGANIZED HEALTH CARE EDUCATION/TRAINING PROGRAM

## 2024-03-02 PROCEDURE — 94640 AIRWAY INHALATION TREATMENT: CPT

## 2024-03-02 PROCEDURE — 6370000000 HC RX 637 (ALT 250 FOR IP): Performed by: PHYSICIAN ASSISTANT

## 2024-03-02 RX ADMIN — IPRATROPIUM BROMIDE AND ALBUTEROL SULFATE 1 DOSE: 2.5; .5 SOLUTION RESPIRATORY (INHALATION) at 11:24

## 2024-03-02 RX ADMIN — BENZONATATE 200 MG: 100 CAPSULE ORAL at 09:07

## 2024-03-02 RX ADMIN — FUROSEMIDE 20 MG: 40 TABLET ORAL at 09:07

## 2024-03-02 RX ADMIN — ENOXAPARIN SODIUM 40 MG: 100 INJECTION SUBCUTANEOUS at 09:06

## 2024-03-02 RX ADMIN — GABAPENTIN 300 MG: 300 CAPSULE ORAL at 09:07

## 2024-03-02 RX ADMIN — GABAPENTIN 300 MG: 300 CAPSULE ORAL at 17:20

## 2024-03-02 RX ADMIN — AZELASTINE HYDROCHLORIDE 1 SPRAY: 137 SPRAY, METERED NASAL at 09:33

## 2024-03-02 RX ADMIN — PANTOPRAZOLE SODIUM 40 MG: 40 TABLET, DELAYED RELEASE ORAL at 09:07

## 2024-03-02 RX ADMIN — LEVOTHYROXINE SODIUM 88 MCG: 0.09 TABLET ORAL at 06:59

## 2024-03-02 RX ADMIN — ARFORMOTEROL TARTRATE: 15 SOLUTION RESPIRATORY (INHALATION) at 19:50

## 2024-03-02 RX ADMIN — BENZONATATE 200 MG: 100 CAPSULE ORAL at 22:41

## 2024-03-02 RX ADMIN — MONTELUKAST 10 MG: 10 TABLET, FILM COATED ORAL at 09:07

## 2024-03-02 RX ADMIN — AZELASTINE HYDROCHLORIDE 1 SPRAY: 137 SPRAY, METERED NASAL at 20:19

## 2024-03-02 RX ADMIN — PANTOPRAZOLE SODIUM 40 MG: 40 TABLET, DELAYED RELEASE ORAL at 20:18

## 2024-03-02 RX ADMIN — GUAIFENESIN 1200 MG: 600 TABLET, EXTENDED RELEASE ORAL at 20:18

## 2024-03-02 RX ADMIN — PREDNISONE 40 MG: 20 TABLET ORAL at 09:07

## 2024-03-02 RX ADMIN — IPRATROPIUM BROMIDE AND ALBUTEROL SULFATE 1 DOSE: 2.5; .5 SOLUTION RESPIRATORY (INHALATION) at 15:08

## 2024-03-02 RX ADMIN — IPRATROPIUM BROMIDE AND ALBUTEROL SULFATE 1 DOSE: 2.5; .5 SOLUTION RESPIRATORY (INHALATION) at 19:50

## 2024-03-02 RX ADMIN — IPRATROPIUM BROMIDE AND ALBUTEROL SULFATE 1 DOSE: 2.5; .5 SOLUTION RESPIRATORY (INHALATION) at 07:58

## 2024-03-02 RX ADMIN — GABAPENTIN 300 MG: 300 CAPSULE ORAL at 20:18

## 2024-03-02 RX ADMIN — SODIUM CHLORIDE, PRESERVATIVE FREE 10 ML: 5 INJECTION INTRAVENOUS at 20:20

## 2024-03-02 RX ADMIN — ARFORMOTEROL TARTRATE: 15 SOLUTION RESPIRATORY (INHALATION) at 07:57

## 2024-03-02 RX ADMIN — GUAIFENESIN 1200 MG: 600 TABLET, EXTENDED RELEASE ORAL at 09:07

## 2024-03-02 RX ADMIN — CETIRIZINE HYDROCHLORIDE 10 MG: 10 TABLET, FILM COATED ORAL at 09:07

## 2024-03-02 RX ADMIN — BENZONATATE 200 MG: 100 CAPSULE ORAL at 17:20

## 2024-03-02 ASSESSMENT — PAIN SCALES - GENERAL
PAINLEVEL_OUTOF10: 0
PAINLEVEL_OUTOF10: 0

## 2024-03-03 PROCEDURE — 2580000003 HC RX 258: Performed by: STUDENT IN AN ORGANIZED HEALTH CARE EDUCATION/TRAINING PROGRAM

## 2024-03-03 PROCEDURE — 6370000000 HC RX 637 (ALT 250 FOR IP): Performed by: HOSPITALIST

## 2024-03-03 PROCEDURE — 6360000002 HC RX W HCPCS: Performed by: STUDENT IN AN ORGANIZED HEALTH CARE EDUCATION/TRAINING PROGRAM

## 2024-03-03 PROCEDURE — 6370000000 HC RX 637 (ALT 250 FOR IP): Performed by: PHYSICIAN ASSISTANT

## 2024-03-03 PROCEDURE — 2700000000 HC OXYGEN THERAPY PER DAY

## 2024-03-03 PROCEDURE — 6370000000 HC RX 637 (ALT 250 FOR IP): Performed by: INTERNAL MEDICINE

## 2024-03-03 PROCEDURE — 6370000000 HC RX 637 (ALT 250 FOR IP): Performed by: STUDENT IN AN ORGANIZED HEALTH CARE EDUCATION/TRAINING PROGRAM

## 2024-03-03 PROCEDURE — 94640 AIRWAY INHALATION TREATMENT: CPT

## 2024-03-03 PROCEDURE — 94760 N-INVAS EAR/PLS OXIMETRY 1: CPT

## 2024-03-03 PROCEDURE — 2060000000 HC ICU INTERMEDIATE R&B

## 2024-03-03 RX ADMIN — PANTOPRAZOLE SODIUM 40 MG: 40 TABLET, DELAYED RELEASE ORAL at 09:11

## 2024-03-03 RX ADMIN — GUAIFENESIN 1200 MG: 600 TABLET, EXTENDED RELEASE ORAL at 09:11

## 2024-03-03 RX ADMIN — SODIUM CHLORIDE, PRESERVATIVE FREE 10 ML: 5 INJECTION INTRAVENOUS at 21:01

## 2024-03-03 RX ADMIN — ARFORMOTEROL TARTRATE: 15 SOLUTION RESPIRATORY (INHALATION) at 08:21

## 2024-03-03 RX ADMIN — IPRATROPIUM BROMIDE AND ALBUTEROL SULFATE 1 DOSE: 2.5; .5 SOLUTION RESPIRATORY (INHALATION) at 20:05

## 2024-03-03 RX ADMIN — MONTELUKAST 10 MG: 10 TABLET, FILM COATED ORAL at 09:11

## 2024-03-03 RX ADMIN — AZELASTINE HYDROCHLORIDE 1 SPRAY: 137 SPRAY, METERED NASAL at 21:00

## 2024-03-03 RX ADMIN — BENZONATATE 200 MG: 100 CAPSULE ORAL at 15:34

## 2024-03-03 RX ADMIN — FUROSEMIDE 20 MG: 40 TABLET ORAL at 09:10

## 2024-03-03 RX ADMIN — AZELASTINE HYDROCHLORIDE 1 SPRAY: 137 SPRAY, METERED NASAL at 09:37

## 2024-03-03 RX ADMIN — ARFORMOTEROL TARTRATE: 15 SOLUTION RESPIRATORY (INHALATION) at 20:06

## 2024-03-03 RX ADMIN — LEVOTHYROXINE SODIUM 88 MCG: 0.09 TABLET ORAL at 09:14

## 2024-03-03 RX ADMIN — GABAPENTIN 300 MG: 300 CAPSULE ORAL at 20:58

## 2024-03-03 RX ADMIN — IPRATROPIUM BROMIDE AND ALBUTEROL SULFATE 1 DOSE: 2.5; .5 SOLUTION RESPIRATORY (INHALATION) at 11:51

## 2024-03-03 RX ADMIN — CETIRIZINE HYDROCHLORIDE 10 MG: 10 TABLET, FILM COATED ORAL at 09:11

## 2024-03-03 RX ADMIN — PREDNISONE 40 MG: 20 TABLET ORAL at 09:11

## 2024-03-03 RX ADMIN — GABAPENTIN 300 MG: 300 CAPSULE ORAL at 09:11

## 2024-03-03 RX ADMIN — ENOXAPARIN SODIUM 40 MG: 100 INJECTION SUBCUTANEOUS at 09:11

## 2024-03-03 RX ADMIN — IPRATROPIUM BROMIDE AND ALBUTEROL SULFATE 1 DOSE: 2.5; .5 SOLUTION RESPIRATORY (INHALATION) at 08:21

## 2024-03-03 RX ADMIN — IPRATROPIUM BROMIDE AND ALBUTEROL SULFATE 1 DOSE: 2.5; .5 SOLUTION RESPIRATORY (INHALATION) at 16:20

## 2024-03-03 RX ADMIN — GABAPENTIN 300 MG: 300 CAPSULE ORAL at 15:34

## 2024-03-03 RX ADMIN — BENZONATATE 200 MG: 100 CAPSULE ORAL at 09:11

## 2024-03-03 RX ADMIN — GUAIFENESIN 1200 MG: 600 TABLET, EXTENDED RELEASE ORAL at 20:58

## 2024-03-03 RX ADMIN — BENZONATATE 200 MG: 100 CAPSULE ORAL at 20:58

## 2024-03-03 RX ADMIN — PANTOPRAZOLE SODIUM 40 MG: 40 TABLET, DELAYED RELEASE ORAL at 20:58

## 2024-03-03 ASSESSMENT — PULMONARY FUNCTION TESTS: PEFR_L/MIN: 96

## 2024-03-03 ASSESSMENT — PAIN SCALES - GENERAL
PAINLEVEL_OUTOF10: 0

## 2024-03-04 PROCEDURE — 94760 N-INVAS EAR/PLS OXIMETRY 1: CPT

## 2024-03-04 PROCEDURE — 2060000000 HC ICU INTERMEDIATE R&B

## 2024-03-04 PROCEDURE — 6360000002 HC RX W HCPCS: Performed by: STUDENT IN AN ORGANIZED HEALTH CARE EDUCATION/TRAINING PROGRAM

## 2024-03-04 PROCEDURE — 97530 THERAPEUTIC ACTIVITIES: CPT

## 2024-03-04 PROCEDURE — 97116 GAIT TRAINING THERAPY: CPT

## 2024-03-04 PROCEDURE — 2700000000 HC OXYGEN THERAPY PER DAY

## 2024-03-04 PROCEDURE — 6370000000 HC RX 637 (ALT 250 FOR IP): Performed by: INTERNAL MEDICINE

## 2024-03-04 PROCEDURE — 2580000003 HC RX 258: Performed by: STUDENT IN AN ORGANIZED HEALTH CARE EDUCATION/TRAINING PROGRAM

## 2024-03-04 PROCEDURE — 6370000000 HC RX 637 (ALT 250 FOR IP): Performed by: HOSPITALIST

## 2024-03-04 PROCEDURE — 6370000000 HC RX 637 (ALT 250 FOR IP): Performed by: STUDENT IN AN ORGANIZED HEALTH CARE EDUCATION/TRAINING PROGRAM

## 2024-03-04 PROCEDURE — 94640 AIRWAY INHALATION TREATMENT: CPT

## 2024-03-04 PROCEDURE — 97535 SELF CARE MNGMENT TRAINING: CPT

## 2024-03-04 PROCEDURE — 6370000000 HC RX 637 (ALT 250 FOR IP): Performed by: PHYSICIAN ASSISTANT

## 2024-03-04 RX ORDER — IPRATROPIUM BROMIDE AND ALBUTEROL SULFATE 2.5; .5 MG/3ML; MG/3ML
1 SOLUTION RESPIRATORY (INHALATION)
Status: DISCONTINUED | OUTPATIENT
Start: 2024-03-04 | End: 2024-03-06 | Stop reason: HOSPADM

## 2024-03-04 RX ADMIN — SODIUM CHLORIDE, PRESERVATIVE FREE 10 ML: 5 INJECTION INTRAVENOUS at 09:50

## 2024-03-04 RX ADMIN — IPRATROPIUM BROMIDE AND ALBUTEROL SULFATE 1 DOSE: 2.5; .5 SOLUTION RESPIRATORY (INHALATION) at 13:26

## 2024-03-04 RX ADMIN — BENZONATATE 200 MG: 100 CAPSULE ORAL at 23:21

## 2024-03-04 RX ADMIN — PANTOPRAZOLE SODIUM 40 MG: 40 TABLET, DELAYED RELEASE ORAL at 23:22

## 2024-03-04 RX ADMIN — FUROSEMIDE 20 MG: 40 TABLET ORAL at 09:48

## 2024-03-04 RX ADMIN — PANTOPRAZOLE SODIUM 40 MG: 40 TABLET, DELAYED RELEASE ORAL at 09:48

## 2024-03-04 RX ADMIN — BENZONATATE 200 MG: 100 CAPSULE ORAL at 09:49

## 2024-03-04 RX ADMIN — AZELASTINE HYDROCHLORIDE 1 SPRAY: 137 SPRAY, METERED NASAL at 09:50

## 2024-03-04 RX ADMIN — IPRATROPIUM BROMIDE AND ALBUTEROL SULFATE 1 DOSE: 2.5; .5 SOLUTION RESPIRATORY (INHALATION) at 22:24

## 2024-03-04 RX ADMIN — SODIUM CHLORIDE, PRESERVATIVE FREE 10 ML: 5 INJECTION INTRAVENOUS at 23:26

## 2024-03-04 RX ADMIN — GUAIFENESIN 1200 MG: 600 TABLET, EXTENDED RELEASE ORAL at 09:48

## 2024-03-04 RX ADMIN — GABAPENTIN 300 MG: 300 CAPSULE ORAL at 23:22

## 2024-03-04 RX ADMIN — PREDNISONE 40 MG: 20 TABLET ORAL at 09:48

## 2024-03-04 RX ADMIN — AZELASTINE HYDROCHLORIDE 1 SPRAY: 137 SPRAY, METERED NASAL at 23:26

## 2024-03-04 RX ADMIN — LEVOTHYROXINE SODIUM 88 MCG: 0.09 TABLET ORAL at 09:52

## 2024-03-04 RX ADMIN — BENZONATATE 200 MG: 100 CAPSULE ORAL at 19:50

## 2024-03-04 RX ADMIN — MONTELUKAST 10 MG: 10 TABLET, FILM COATED ORAL at 09:49

## 2024-03-04 RX ADMIN — ARFORMOTEROL TARTRATE: 15 SOLUTION RESPIRATORY (INHALATION) at 22:24

## 2024-03-04 RX ADMIN — POLYETHYLENE GLYCOL 3350 17 G: 17 POWDER, FOR SOLUTION ORAL at 14:37

## 2024-03-04 RX ADMIN — IPRATROPIUM BROMIDE AND ALBUTEROL SULFATE 1 DOSE: 2.5; .5 SOLUTION RESPIRATORY (INHALATION) at 08:14

## 2024-03-04 RX ADMIN — ARFORMOTEROL TARTRATE: 15 SOLUTION RESPIRATORY (INHALATION) at 08:14

## 2024-03-04 RX ADMIN — GABAPENTIN 300 MG: 300 CAPSULE ORAL at 14:37

## 2024-03-04 RX ADMIN — GABAPENTIN 300 MG: 300 CAPSULE ORAL at 09:48

## 2024-03-04 RX ADMIN — CETIRIZINE HYDROCHLORIDE 10 MG: 10 TABLET, FILM COATED ORAL at 09:48

## 2024-03-04 RX ADMIN — GUAIFENESIN 1200 MG: 600 TABLET, EXTENDED RELEASE ORAL at 23:21

## 2024-03-04 RX ADMIN — ENOXAPARIN SODIUM 40 MG: 100 INJECTION SUBCUTANEOUS at 09:49

## 2024-03-04 ASSESSMENT — PAIN SCALES - GENERAL
PAINLEVEL_OUTOF10: 0
PAINLEVEL_OUTOF10: 0

## 2024-03-04 NOTE — CARE COORDINATION
Transition Of Care:    Update 2:34pm: [facility] liaison Alejandrina ([phone]) stated bed will be available tomorrow 3/5/24. Pulmonary has ordered a concentrator for up to 10L. CM will forward this order to SNF. Alejandrina at [facility] states they can accept a patient on 02 up to a max of 10L 02. Patient bally on 6L 02 nc.   9:17am: Update 12:16pm: Insurance auth granted for [facility]. Auth ID: [id number]. Auth good 4/4 through 4/6. Care coordinator is Andres Haley. Pending medical clearance and bed availability at SNF. Transport TBD.     CM left message with [facility] liaisons: [place]: [phone]. Awaiting call back.    9:17am:[facility] SNF accepted. [employer] auth with [employer] offering peer to peer today 3-4-24 due by 12 noon. Ref: [id number]. Attending notified to call: [phone], ext: [phone]. Prior plan was home with [facility] and family to transport.     Noted, patient pays out of pocket for home 02. The patient has her own concentrator with 6L of 02 as max limit and 2 portable units that go up to 3L of 02.      RUR: 17%  Prior Level of Functioning: Independent, drives   Disposition: SNF vs [facility]  If SNF or IPR: Date FOC offered: 2/29/24  Date FOC received: 2/29/24  Accepting facility: Pending   Date authorization started with reference number: Pending  Date authorization received and expires: Pending   Follow up appointments: Per attending's recommendations  DME needed: None  Transportation at discharge: Family  IM/IMM Medicare/ letter given: 2/19/24  Is patient a [place] and connected with VA?No               If yes, was  transfer form completed and VA notified?   Caregiver Contact: Daughter in law: Emelia Morse: [phone]  Discharge Caregiver contacted prior to discharge? Per attending's recommendations  Care Conference needed? None   Barriers to discharge: None     Demetra Delvalle RN/CRM  [phone]

## 2024-03-05 ENCOUNTER — APPOINTMENT (OUTPATIENT)
Facility: HOSPITAL | Age: 79
DRG: 189 | End: 2024-03-05
Payer: MEDICARE

## 2024-03-05 LAB
ANION GAP SERPL CALC-SCNC: 2 MMOL/L (ref 5–15)
BUN SERPL-MCNC: 21 MG/DL (ref 6–20)
BUN/CREAT SERPL: 22 (ref 12–20)
CALCIUM SERPL-MCNC: 8.8 MG/DL (ref 8.5–10.1)
CHLORIDE SERPL-SCNC: 102 MMOL/L (ref 97–108)
CO2 SERPL-SCNC: 32 MMOL/L (ref 21–32)
CREAT SERPL-MCNC: 0.95 MG/DL (ref 0.55–1.02)
ERYTHROCYTE [DISTWIDTH] IN BLOOD BY AUTOMATED COUNT: 13.2 % (ref 11.5–14.5)
GLUCOSE SERPL-MCNC: 98 MG/DL (ref 65–100)
HCT VFR BLD AUTO: 40.6 % (ref 35–47)
HGB BLD-MCNC: 13.8 G/DL (ref 11.5–16)
MCH RBC QN AUTO: 33.2 PG (ref 26–34)
MCHC RBC AUTO-ENTMCNC: 34 G/DL (ref 30–36.5)
MCV RBC AUTO: 97.6 FL (ref 80–99)
NRBC # BLD: 0 K/UL (ref 0–0.01)
NRBC BLD-RTO: 0 PER 100 WBC
PLATELET # BLD AUTO: 111 K/UL (ref 150–400)
PMV BLD AUTO: 12.4 FL (ref 8.9–12.9)
POTASSIUM SERPL-SCNC: 3.8 MMOL/L (ref 3.5–5.1)
RBC # BLD AUTO: 4.16 M/UL (ref 3.8–5.2)
SODIUM SERPL-SCNC: 136 MMOL/L (ref 136–145)
WBC # BLD AUTO: 14.3 K/UL (ref 3.6–11)

## 2024-03-05 PROCEDURE — 6360000002 HC RX W HCPCS: Performed by: STUDENT IN AN ORGANIZED HEALTH CARE EDUCATION/TRAINING PROGRAM

## 2024-03-05 PROCEDURE — 97530 THERAPEUTIC ACTIVITIES: CPT

## 2024-03-05 PROCEDURE — 94640 AIRWAY INHALATION TREATMENT: CPT

## 2024-03-05 PROCEDURE — 6370000000 HC RX 637 (ALT 250 FOR IP): Performed by: PHYSICIAN ASSISTANT

## 2024-03-05 PROCEDURE — 2700000000 HC OXYGEN THERAPY PER DAY

## 2024-03-05 PROCEDURE — 80048 BASIC METABOLIC PNL TOTAL CA: CPT

## 2024-03-05 PROCEDURE — 97535 SELF CARE MNGMENT TRAINING: CPT

## 2024-03-05 PROCEDURE — 36415 COLL VENOUS BLD VENIPUNCTURE: CPT

## 2024-03-05 PROCEDURE — 6370000000 HC RX 637 (ALT 250 FOR IP): Performed by: HOSPITALIST

## 2024-03-05 PROCEDURE — 6370000000 HC RX 637 (ALT 250 FOR IP): Performed by: INTERNAL MEDICINE

## 2024-03-05 PROCEDURE — 97116 GAIT TRAINING THERAPY: CPT

## 2024-03-05 PROCEDURE — 85027 COMPLETE CBC AUTOMATED: CPT

## 2024-03-05 PROCEDURE — 2580000003 HC RX 258: Performed by: STUDENT IN AN ORGANIZED HEALTH CARE EDUCATION/TRAINING PROGRAM

## 2024-03-05 PROCEDURE — 6370000000 HC RX 637 (ALT 250 FOR IP): Performed by: STUDENT IN AN ORGANIZED HEALTH CARE EDUCATION/TRAINING PROGRAM

## 2024-03-05 PROCEDURE — 2060000000 HC ICU INTERMEDIATE R&B

## 2024-03-05 PROCEDURE — 94760 N-INVAS EAR/PLS OXIMETRY 1: CPT

## 2024-03-05 PROCEDURE — 71045 X-RAY EXAM CHEST 1 VIEW: CPT

## 2024-03-05 PROCEDURE — 2500000003 HC RX 250 WO HCPCS: Performed by: INTERNAL MEDICINE

## 2024-03-05 RX ORDER — MORPHINE SULFATE 2 MG/ML
2 INJECTION, SOLUTION INTRAMUSCULAR; INTRAVENOUS EVERY 4 HOURS PRN
Status: DISCONTINUED | OUTPATIENT
Start: 2024-03-05 | End: 2024-03-05

## 2024-03-05 RX ORDER — PROMETHAZINE HYDROCHLORIDE AND CODEINE PHOSPHATE 6.25; 1 MG/5ML; MG/5ML
5 SYRUP ORAL EVERY 4 HOURS PRN
Status: DISCONTINUED | OUTPATIENT
Start: 2024-03-05 | End: 2024-03-06 | Stop reason: HOSPADM

## 2024-03-05 RX ORDER — KETOROLAC TROMETHAMINE 30 MG/ML
15 INJECTION, SOLUTION INTRAMUSCULAR; INTRAVENOUS EVERY 6 HOURS PRN
Status: DISCONTINUED | OUTPATIENT
Start: 2024-03-05 | End: 2024-03-05

## 2024-03-05 RX ADMIN — IPRATROPIUM BROMIDE AND ALBUTEROL SULFATE 1 DOSE: 2.5; .5 SOLUTION RESPIRATORY (INHALATION) at 14:04

## 2024-03-05 RX ADMIN — PANTOPRAZOLE SODIUM 40 MG: 40 TABLET, DELAYED RELEASE ORAL at 10:23

## 2024-03-05 RX ADMIN — AZELASTINE HYDROCHLORIDE 1 SPRAY: 137 SPRAY, METERED NASAL at 21:14

## 2024-03-05 RX ADMIN — AZELASTINE HYDROCHLORIDE 1 SPRAY: 137 SPRAY, METERED NASAL at 10:30

## 2024-03-05 RX ADMIN — IPRATROPIUM BROMIDE AND ALBUTEROL SULFATE 1 DOSE: 2.5; .5 SOLUTION RESPIRATORY (INHALATION) at 20:03

## 2024-03-05 RX ADMIN — FUROSEMIDE 20 MG: 40 TABLET ORAL at 10:26

## 2024-03-05 RX ADMIN — ARFORMOTEROL TARTRATE: 15 SOLUTION RESPIRATORY (INHALATION) at 08:36

## 2024-03-05 RX ADMIN — GUAIFENESIN 200 MG: 200 SOLUTION ORAL at 03:23

## 2024-03-05 RX ADMIN — GABAPENTIN 300 MG: 300 CAPSULE ORAL at 21:15

## 2024-03-05 RX ADMIN — SODIUM CHLORIDE, PRESERVATIVE FREE 10 ML: 5 INJECTION INTRAVENOUS at 10:34

## 2024-03-05 RX ADMIN — SODIUM CHLORIDE, PRESERVATIVE FREE 10 ML: 5 INJECTION INTRAVENOUS at 21:17

## 2024-03-05 RX ADMIN — GUAIFENESIN 1200 MG: 600 TABLET, EXTENDED RELEASE ORAL at 21:15

## 2024-03-05 RX ADMIN — MONTELUKAST 10 MG: 10 TABLET, FILM COATED ORAL at 10:25

## 2024-03-05 RX ADMIN — ENOXAPARIN SODIUM 40 MG: 100 INJECTION SUBCUTANEOUS at 10:29

## 2024-03-05 RX ADMIN — LEVOTHYROXINE SODIUM 88 MCG: 0.09 TABLET ORAL at 07:18

## 2024-03-05 RX ADMIN — BENZONATATE 200 MG: 100 CAPSULE ORAL at 10:33

## 2024-03-05 RX ADMIN — IPRATROPIUM BROMIDE AND ALBUTEROL SULFATE 1 DOSE: 2.5; .5 SOLUTION RESPIRATORY (INHALATION) at 08:36

## 2024-03-05 RX ADMIN — GABAPENTIN 300 MG: 300 CAPSULE ORAL at 10:24

## 2024-03-05 RX ADMIN — GUAIFENESIN 1200 MG: 600 TABLET, EXTENDED RELEASE ORAL at 10:25

## 2024-03-05 RX ADMIN — BENZONATATE 200 MG: 100 CAPSULE ORAL at 18:22

## 2024-03-05 RX ADMIN — ARFORMOTEROL TARTRATE: 15 SOLUTION RESPIRATORY (INHALATION) at 20:03

## 2024-03-05 RX ADMIN — BENZONATATE 200 MG: 100 CAPSULE ORAL at 21:15

## 2024-03-05 RX ADMIN — PANTOPRAZOLE SODIUM 40 MG: 40 TABLET, DELAYED RELEASE ORAL at 21:15

## 2024-03-05 RX ADMIN — CETIRIZINE HYDROCHLORIDE 10 MG: 10 TABLET, FILM COATED ORAL at 10:26

## 2024-03-05 RX ADMIN — GABAPENTIN 300 MG: 300 CAPSULE ORAL at 13:44

## 2024-03-05 RX ADMIN — PREDNISONE 40 MG: 20 TABLET ORAL at 10:25

## 2024-03-05 ASSESSMENT — PAIN SCALES - GENERAL
PAINLEVEL_OUTOF10: 0

## 2024-03-05 NOTE — CARE COORDINATION
Transition of Care Plan to SNF/Rehab    Communication to Patient/Family:  Met with patient and family and they are agreeable to the transition plan. The Plan for Transition of Care is related to the following treatment goals: The patient is discharging to [facility] today with [facility] wheelchair van transport at 4pm. RN to call report to: [phone]. [facility]. Patient paying out of pocket for [facility] with 02.     The Patient and/or patient representative was provided with a choice of provider and agrees  with the discharge plan.      Yes [x] No []    A Freedom of choice list was provided with basic dialogue that supports the patient's individualized plan of care/goals and shares the quality data associated with the providers.       Yes [x] No []    SNF/Rehab Transition:  Patient has been accepted to [facility] SNF/Rehab and meets criteria for admission.   Patient will transported by [facility] and expected to leave at 4pm.     Communication to SNF/Rehab:  Bedside RN, Sadie, has been notified to update the transition plan to the facility and call report (phone number).  Discharge information has been updated on the AVS. And communicated to facility via [employer] - is now [employer], or CC link.       Nursing Please include all hard scripts for controlled substances, med rec and dc summary, and AVS in packet.     Reviewed and confirmed with facility, Nguy[facility] can manage the patient care needs for the following:     Sean with (X) only those applicable:  Medication:  [x]Medications are available at the facility  []IV Antibiotics    []Controlled Substance - hard copies available sent.  []Weekly Labs    Equipment:  []CPAP/BiPAP  []Wound Vacuum  []Fortune or Urinary Device  []PICC/Central Line  []Nebulizer  []Ventilator    Treatment:  []Isolation (for MRSA, VRE, etc.)  []Surgical Drain Management  []Tracheostomy Care  []Dressing Changes  []Dialysis with transportation  []PEG Care  [x]Oxygen  []Daily

## 2024-03-05 NOTE — CARE COORDINATION
Transition Of Care:    Update 12:27pm: MD cancelleing discharge for today due to medical stability. CM called and placed [facility] To [facility] on will call for tomorrow. CM called and notified [facility] SNF liaison.   11:48am: The patient is discharging to [facility] today at 4pm with Hospital To Home wheelchair van transport with 6L 02 arranged with patient agreeing to pay for transport.  Insurance auth granted for [facility]. Auth ID: [id number]. Auth good 4/4 through 4/6. Care coordinator is Andres Haley.   Noted, patient pays out of pocket for home 02.     The patient has her own concentrator with 6L of 02 as max limit and 2 portable units that go up to 3L of 02.     Noted, Pulmonary ordered a concentrator for up to 10L 02. CM called liaison at [facility] and [place] stated they can accept a patient up to 10L 02 and they will take over the order for new concentrator for patient when patient discharges home from SNF. CM faxed order to: [phone].     RUR: 17%  Prior Level of Functioning: Independent, drives   Disposition: SNF vs [facility]  If SNF or IPR: Date FOC offered: 2/29/24  Date FOC received: 2/29/24  Accepting facility: Pending   Date authorization started with reference number: Pending  Date authorization received and expires: Pending   Follow up appointments: Per attending's recommendations  DME needed: None  Transportation at discharge: Family  IM/IMM Medicare/ letter given: 2/19/24  Is patient a [place] and connected with VA?No               If yes, was [place] transfer form completed and VA notified?   Caregiver Contact: Daughter in law: Emelia Morse: [phone]  Discharge Caregiver contacted prior to discharge? Per attending's recommendations  Care Conference needed? None   Barriers to discharge: None     Demetra Delvalle RN/CRM  [phone]

## 2024-03-06 VITALS
BODY MASS INDEX: 30.97 KG/M2 | RESPIRATION RATE: 30 BRPM | TEMPERATURE: 99.5 F | HEIGHT: 61 IN | OXYGEN SATURATION: 92 % | DIASTOLIC BLOOD PRESSURE: 74 MMHG | HEART RATE: 131 BPM | WEIGHT: 164.02 LBS | SYSTOLIC BLOOD PRESSURE: 114 MMHG

## 2024-03-06 LAB
ANION GAP SERPL CALC-SCNC: 4 MMOL/L (ref 5–15)
BUN SERPL-MCNC: 17 MG/DL (ref 6–20)
BUN/CREAT SERPL: 25 (ref 12–20)
CALCIUM SERPL-MCNC: 8.9 MG/DL (ref 8.5–10.1)
CHLORIDE SERPL-SCNC: 104 MMOL/L (ref 97–108)
CO2 SERPL-SCNC: 32 MMOL/L (ref 21–32)
CREAT SERPL-MCNC: 0.67 MG/DL (ref 0.55–1.02)
ERYTHROCYTE [DISTWIDTH] IN BLOOD BY AUTOMATED COUNT: 13.1 % (ref 11.5–14.5)
GLUCOSE SERPL-MCNC: 107 MG/DL (ref 65–100)
HCT VFR BLD AUTO: 40.9 % (ref 35–47)
HGB BLD-MCNC: 13.3 G/DL (ref 11.5–16)
MCH RBC QN AUTO: 32.7 PG (ref 26–34)
MCHC RBC AUTO-ENTMCNC: 32.5 G/DL (ref 30–36.5)
MCV RBC AUTO: 100.5 FL (ref 80–99)
NRBC # BLD: 0 K/UL (ref 0–0.01)
NRBC BLD-RTO: 0 PER 100 WBC
PLATELET # BLD AUTO: 117 K/UL (ref 150–400)
PMV BLD AUTO: 13 FL (ref 8.9–12.9)
POTASSIUM SERPL-SCNC: 3.6 MMOL/L (ref 3.5–5.1)
RBC # BLD AUTO: 4.07 M/UL (ref 3.8–5.2)
SODIUM SERPL-SCNC: 140 MMOL/L (ref 136–145)
WBC # BLD AUTO: 12.2 K/UL (ref 3.6–11)

## 2024-03-06 PROCEDURE — 97116 GAIT TRAINING THERAPY: CPT

## 2024-03-06 PROCEDURE — 94640 AIRWAY INHALATION TREATMENT: CPT

## 2024-03-06 PROCEDURE — 85027 COMPLETE CBC AUTOMATED: CPT

## 2024-03-06 PROCEDURE — 80048 BASIC METABOLIC PNL TOTAL CA: CPT

## 2024-03-06 PROCEDURE — 6360000002 HC RX W HCPCS: Performed by: STUDENT IN AN ORGANIZED HEALTH CARE EDUCATION/TRAINING PROGRAM

## 2024-03-06 PROCEDURE — 36415 COLL VENOUS BLD VENIPUNCTURE: CPT

## 2024-03-06 PROCEDURE — 2580000003 HC RX 258: Performed by: STUDENT IN AN ORGANIZED HEALTH CARE EDUCATION/TRAINING PROGRAM

## 2024-03-06 PROCEDURE — 6370000000 HC RX 637 (ALT 250 FOR IP): Performed by: INTERNAL MEDICINE

## 2024-03-06 PROCEDURE — 2700000000 HC OXYGEN THERAPY PER DAY

## 2024-03-06 PROCEDURE — 94760 N-INVAS EAR/PLS OXIMETRY 1: CPT

## 2024-03-06 PROCEDURE — 6370000000 HC RX 637 (ALT 250 FOR IP): Performed by: STUDENT IN AN ORGANIZED HEALTH CARE EDUCATION/TRAINING PROGRAM

## 2024-03-06 PROCEDURE — 97530 THERAPEUTIC ACTIVITIES: CPT

## 2024-03-06 PROCEDURE — 97535 SELF CARE MNGMENT TRAINING: CPT

## 2024-03-06 PROCEDURE — 6370000000 HC RX 637 (ALT 250 FOR IP): Performed by: HOSPITALIST

## 2024-03-06 PROCEDURE — 6370000000 HC RX 637 (ALT 250 FOR IP): Performed by: PHYSICIAN ASSISTANT

## 2024-03-06 RX ORDER — FUROSEMIDE 40 MG/1
20 TABLET ORAL ONCE
Status: DISCONTINUED | OUTPATIENT
Start: 2024-03-06 | End: 2024-03-06 | Stop reason: HOSPADM

## 2024-03-06 RX ORDER — PREDNISONE 5 MG/1
TABLET ORAL
Qty: 35 TABLET | Refills: 0 | Status: SHIPPED
Start: 2024-03-06

## 2024-03-06 RX ORDER — FUROSEMIDE 20 MG/1
20 TABLET ORAL DAILY
Qty: 60 TABLET | Refills: 3 | Status: SHIPPED | OUTPATIENT
Start: 2024-03-07

## 2024-03-06 RX ORDER — IPRATROPIUM BROMIDE 21 UG/1
2 SPRAY, METERED NASAL 2 TIMES DAILY
Status: DISCONTINUED | OUTPATIENT
Start: 2024-03-06 | End: 2024-03-06 | Stop reason: HOSPADM

## 2024-03-06 RX ORDER — GUAIFENESIN 600 MG/1
600 TABLET, EXTENDED RELEASE ORAL 2 TIMES DAILY
Qty: 30 TABLET | Refills: 0 | Status: SHIPPED | OUTPATIENT
Start: 2024-03-06 | End: 2024-03-21

## 2024-03-06 RX ADMIN — CETIRIZINE HYDROCHLORIDE 10 MG: 10 TABLET, FILM COATED ORAL at 09:04

## 2024-03-06 RX ADMIN — IPRATROPIUM BROMIDE AND ALBUTEROL SULFATE 1 DOSE: 2.5; .5 SOLUTION RESPIRATORY (INHALATION) at 13:31

## 2024-03-06 RX ADMIN — LEVOTHYROXINE SODIUM 88 MCG: 0.09 TABLET ORAL at 07:10

## 2024-03-06 RX ADMIN — MONTELUKAST 10 MG: 10 TABLET, FILM COATED ORAL at 09:04

## 2024-03-06 RX ADMIN — ARFORMOTEROL TARTRATE: 15 SOLUTION RESPIRATORY (INHALATION) at 07:52

## 2024-03-06 RX ADMIN — PANTOPRAZOLE SODIUM 40 MG: 40 TABLET, DELAYED RELEASE ORAL at 09:04

## 2024-03-06 RX ADMIN — AZELASTINE HYDROCHLORIDE 1 SPRAY: 137 SPRAY, METERED NASAL at 09:09

## 2024-03-06 RX ADMIN — SODIUM CHLORIDE, PRESERVATIVE FREE 10 ML: 5 INJECTION INTRAVENOUS at 09:11

## 2024-03-06 RX ADMIN — IPRATROPIUM BROMIDE 2 SPRAY: 21 SPRAY NASAL at 12:35

## 2024-03-06 RX ADMIN — IPRATROPIUM BROMIDE AND ALBUTEROL SULFATE 1 DOSE: 2.5; .5 SOLUTION RESPIRATORY (INHALATION) at 07:52

## 2024-03-06 RX ADMIN — ENOXAPARIN SODIUM 40 MG: 100 INJECTION SUBCUTANEOUS at 09:04

## 2024-03-06 RX ADMIN — PREDNISONE 40 MG: 20 TABLET ORAL at 09:04

## 2024-03-06 RX ADMIN — GABAPENTIN 300 MG: 300 CAPSULE ORAL at 09:03

## 2024-03-06 RX ADMIN — FUROSEMIDE 20 MG: 40 TABLET ORAL at 09:01

## 2024-03-06 RX ADMIN — BENZONATATE 200 MG: 100 CAPSULE ORAL at 09:03

## 2024-03-06 RX ADMIN — GUAIFENESIN 1200 MG: 600 TABLET, EXTENDED RELEASE ORAL at 09:01

## 2024-03-06 NOTE — CARE COORDINATION
Transition Of Care:     The patient is discharging to [facility] today with [facility] stretcher transport at 1:30pm. Hospital to home [facility] cannot accommodate anyone over 6L o2. Patient is currently on 8 L 02 nc. [facility] notifed and accepting patient today. New 02 concentrator order pulmonologist wrote faxed and received to SNF. RN to call report to [phone] for room [phone].     Noted, Pulmonary ordered a concentrator for up to 10L 02. CM called liaison at [facility] and Alejandrina stated they can accept a patient up to 10L 02 and they will take over the order for new concentrator for patient when patient discharges home from [facility]. CM faxed order to: [phone].   The patient has her own concentrator with 6L of 02 as max limit and 2 portable units that go up to 3L of 02.       RUR: 17%  Prior Level of Functioning: Independent, drives   Disposition: SNF vs [facility]  If SNF or IPR: Date FOC offered: 2/29/24  Date FOC received: 2/29/24  Accepting facility: Pending   Date authorization started with reference number: Pending  Date authorization received and expires: Pending   Follow up appointments: Per attending's recommendations  DME needed: None  Transportation at discharge: Family  IM/IMM Medicare/ letter given: 2/19/24, 3/5/24  Is patient a [place] and connected with VA?No               If yes, was [place] transfer form completed and VA notified?   Caregiver Contact: Daughter in law: Emelia Morse: [phone]  Discharge Caregiver contacted prior to discharge? Per attending's recommendations  Care Conference needed? None   Barriers to discharge: None     Demetra Delvalle RN/CRM  [phone]

## 2024-03-06 NOTE — PLAN OF CARE
Problem: Chronic Conditions and Co-morbidities  Goal: Patient's chronic conditions and co-morbidity symptoms are monitored and maintained or improved  Outcome: Progressing     
  Problem: Discharge Planning  Goal: Discharge to home or other facility with appropriate resources  2/21/2024 0431 by Lisandro Cadena RN  Outcome: Progressing  2/21/2024 0431 by Lisandro Cadena RN  Outcome: Progressing  2/20/2024 2001 by Karolina Cerda, RN  Outcome: Progressing  Flowsheets (Taken 2/20/2024 0815)  Discharge to home or other facility with appropriate resources: Identify barriers to discharge with patient and caregiver     Problem: Safety - Adult  Goal: Free from fall injury  2/21/2024 0431 by Lisandro Cadena RN  Outcome: Progressing  2/21/2024 0431 by Lisandro Cadena RN  Outcome: Progressing  2/20/2024 2001 by Karolina Cerda, RN  Outcome: Progressing     
  Problem: Discharge Planning  Goal: Discharge to home or other facility with appropriate resources  2/29/2024 0315 by Bassam Omalley RN  Outcome: Progressing  2/28/2024 2136 by Bassam Omalley RN  Outcome: Progressing  Flowsheets (Taken 2/28/2024 0930 by Rose Haile, RN)  Discharge to home or other facility with appropriate resources: Identify barriers to discharge with patient and caregiver     Problem: Safety - Adult  Goal: Free from fall injury  2/29/2024 0315 by Bassam Omalley RN  Outcome: Progressing  2/28/2024 2136 by Bassam Omalley RN  Outcome: Progressing     Problem: Skin/Tissue Integrity  Goal: Absence of new skin breakdown  Description: 1.  Monitor for areas of redness and/or skin breakdown  2.  Assess vascular access sites hourly  3.  Every 4-6 hours minimum:  Change oxygen saturation probe site  4.  Every 4-6 hours:  If on nasal continuous positive airway pressure, respiratory therapy assess nares and determine need for appliance change or resting period.  2/29/2024 0315 by Bassam Omalley RN  Outcome: Progressing  2/28/2024 2136 by Bassam Omalley RN  Outcome: Progressing     
  Problem: Discharge Planning  Goal: Discharge to home or other facility with appropriate resources  Outcome: Not Progressing     Problem: Safety - Adult  Goal: Free from fall injury  Outcome: Not Progressing     Problem: Skin/Tissue Integrity  Goal: Absence of new skin breakdown  Description: 1.  Monitor for areas of redness and/or skin breakdown  2.  Assess vascular access sites hourly  3.  Every 4-6 hours minimum:  Change oxygen saturation probe site  4.  Every 4-6 hours:  If on nasal continuous positive airway pressure, respiratory therapy assess nares and determine need for appliance change or resting period.  Outcome: Not Progressing     Problem: Discharge Planning  Goal: Discharge to home or other facility with appropriate resources  Outcome: Not Progressing     Problem: Safety - Adult  Goal: Free from fall injury  Outcome: Not Progressing     Problem: Skin/Tissue Integrity  Goal: Absence of new skin breakdown  Description: 1.  Monitor for areas of redness and/or skin breakdown  2.  Assess vascular access sites hourly  3.  Every 4-6 hours minimum:  Change oxygen saturation probe site  4.  Every 4-6 hours:  If on nasal continuous positive airway pressure, respiratory therapy assess nares and determine need for appliance change or resting period.  Outcome: Not Progressing     
  Problem: Discharge Planning  Goal: Discharge to home or other facility with appropriate resources  Outcome: Progressing     Problem: Safety - Adult  Goal: Free from fall injury  Outcome: Progressing     Problem: Occupational Therapy - Adult  Goal: By Discharge: Performs self-care activities at highest level of function for planned discharge setting.  See evaluation for individualized goals.  Description: FUNCTIONAL STATUS PRIOR TO ADMISSION:  Patient was ambulatory using no DME   , ADL Assistance: Independent,  ,  ,  ,  ,  , Homemaking Assistance: Independent, Ambulation Assistance: Independent, Transfer Assistance: Independent, Active : Yes     HOME SUPPORT: Patient lived alone but did not require assistance, daughter lives locally .    Occupational Therapy Goals:  Initiated 2/21/2024  1.  Patient will perform self-feeding with [place] within 7 day(s).  2.  Patient will perform grooming with Modified [place] within 7 day(s).  3.  Patient will perform bathing with Stand by Assist within 7 day(s).  4.  Patient will perform toilet transfers with Modified [place]  within 7 day(s).  5.  Patient will perform all aspects of toileting with [place] within 7 day(s).  6.  Patient will participate in upper extremity therapeutic exercise/activities with [place] for 5 minutes within 7 day(s).    7.  Patient will utilize energy conservation techniques during functional activities with verbal cues within 7 day(s).   2/26/2024 1610 by Elyse Bob OT  Outcome: Progressing     Problem: Skin/Tissue Integrity  Goal: Absence of new skin breakdown  Description: 1.  Monitor for areas of redness and/or skin breakdown  2.  Assess vascular access sites hourly  3.  Every 4-6 hours minimum:  Change oxygen saturation probe site  4.  Every 4-6 hours:  If on nasal continuous positive airway pressure, respiratory therapy assess nares and determine need for appliance change or resting period.  Outcome: 
  Problem: Discharge Planning  Goal: Discharge to home or other facility with appropriate resources  Outcome: Progressing     Problem: Safety - Adult  Goal: Free from fall injury  Outcome: Progressing     Problem: Skin/Tissue Integrity  Goal: Absence of new skin breakdown  Description: 1.  Monitor for areas of redness and/or skin breakdown  2.  Assess vascular access sites hourly  3.  Every 4-6 hours minimum:  Change oxygen saturation probe site  4.  Every 4-6 hours:  If on nasal continuous positive airway pressure, respiratory therapy assess nares and determine need for appliance change or resting period.  Outcome: Progressing     Problem: Pain  Goal: Verbalizes/displays adequate comfort level or baseline comfort level  Outcome: Progressing  Flowsheets  Taken 3/3/2024 0400  Verbalizes/displays adequate comfort level or baseline comfort level: Assess pain using appropriate pain scale  Taken 3/2/2024 2019  Verbalizes/displays adequate comfort level or baseline comfort level: Assess pain using appropriate pain scale     Problem: Chronic Conditions and Co-morbidities  Goal: Patient's chronic conditions and co-morbidity symptoms are monitored and maintained or improved  Outcome: Progressing     
  Problem: Discharge Planning  Goal: Discharge to home or other facility with appropriate resources  Outcome: Progressing     Problem: Safety - Adult  Goal: Free from fall injury  Outcome: Progressing     Problem: Skin/Tissue Integrity  Goal: Absence of new skin breakdown  Description: 1.  Monitor for areas of redness and/or skin breakdown  2.  Assess vascular access sites hourly  3.  Every 4-6 hours minimum:  Change oxygen saturation probe site  4.  Every 4-6 hours:  If on nasal continuous positive airway pressure, respiratory therapy assess nares and determine need for appliance change or resting period.  Outcome: Progressing     Problem: Pain  Goal: Verbalizes/displays adequate comfort level or baseline comfort level  Outcome: Progressing  Flowsheets  Taken 3/4/2024 0300  Verbalizes/displays adequate comfort level or baseline comfort level: Assess pain using appropriate pain scale  Taken 3/3/2024 2345  Verbalizes/displays adequate comfort level or baseline comfort level: Assess pain using appropriate pain scale  Taken 3/3/2024 2050  Verbalizes/displays adequate comfort level or baseline comfort level: Assess pain using appropriate pain scale     Problem: Chronic Conditions and Co-morbidities  Goal: Patient's chronic conditions and co-morbidity symptoms are monitored and maintained or improved  Outcome: Progressing     
  Problem: Discharge Planning  Goal: Discharge to home or other facility with appropriate resources  Outcome: Progressing  Flowsheets (Taken 2/20/2024 0815)  Discharge to home or other facility with appropriate resources: Identify barriers to discharge with patient and caregiver     Problem: Safety - Adult  Goal: Free from fall injury  Outcome: Progressing     
  Problem: Discharge Planning  Goal: Discharge to home or other facility with appropriate resources  Outcome: Progressing  Flowsheets (Taken 2/28/2024 0930 by Rose Haile, RN)  Discharge to home or other facility with appropriate resources: Identify barriers to discharge with patient and caregiver     Problem: Safety - Adult  Goal: Free from fall injury  Outcome: Progressing     Problem: Physical Therapy - Adult  Goal: By Discharge: Performs mobility at highest level of function for planned discharge setting.  See evaluation for individualized goals.  Description: FUNCTIONAL STATUS PRIOR TO ADMISSION: Patient was independent and active without use of DME. On 2-3LPM via [place] prior to admission. Recent admission with discharge home with HHPT.    HOME SUPPORT PRIOR TO ADMISSION: The patient lived alone with no significant local support. Son is coming in from [place] (unsure if staying?)    Physical Therapy Goals  Initiated 2/21/2024  1.  Patient will move from supine to sit and sit to supine, scoot up and down, and roll side to side in bed with independence within 7 day(s).    2.  Patient will perform sit to stand with independence within 7 day(s).  3.  Patient will transfer from bed to chair and chair to bed with independence using the least restrictive device within 7 day(s).  4.  Patient will ambulate with modified independence for 150 feet with the least restrictive device within 7 day(s).   2/28/2024 1256 by Adela Culp, PT  Outcome: Progressing     Problem: Occupational Therapy - Adult  Goal: By Discharge: Performs self-care activities at highest level of function for planned discharge setting.  See evaluation for individualized goals.  Description: FUNCTIONAL STATUS PRIOR TO ADMISSION:  Patient was ambulatory using no DME   , ADL Assistance: Independent,  ,  ,  ,  ,  , Homemaking Assistance: Independent, Ambulation Assistance: Independent, Transfer Assistance: Independent, Active : Yes     HOME SUPPORT: 
  Problem: Discharge Planning  Goal: Discharge to home or other facility with appropriate resources  Outcome: Progressing  Flowsheets (Taken 3/1/2024 2000)  Discharge to home or other facility with appropriate resources: Identify barriers to discharge with patient and caregiver     Problem: Safety - Adult  Goal: Free from fall injury  Outcome: Progressing     Problem: Skin/Tissue Integrity  Goal: Absence of new skin breakdown  Description: 1.  Monitor for areas of redness and/or skin breakdown  2.  Assess vascular access sites hourly  3.  Every 4-6 hours minimum:  Change oxygen saturation probe site  4.  Every 4-6 hours:  If on nasal continuous positive airway pressure, respiratory therapy assess nares and determine need for appliance change or resting period.  Outcome: Progressing     Problem: Pain  Goal: Verbalizes/displays adequate comfort level or baseline comfort level  Outcome: Progressing     Problem: Chronic Conditions and Co-morbidities  Goal: Patient's chronic conditions and co-morbidity symptoms are monitored and maintained or improved  Outcome: Progressing     
  Problem: Occupational Therapy - Adult  Goal: By Discharge: Performs self-care activities at highest level of function for planned discharge setting.  See evaluation for individualized goals.  Description: FUNCTIONAL STATUS PRIOR TO ADMISSION:  Patient was ambulatory using no DME   , ADL Assistance: Independent,  ,  ,  ,  ,  , Homemaking Assistance: Independent, Ambulation Assistance: Independent, Transfer Assistance: Independent, Active : Yes     HOME SUPPORT: Patient lived alone but did not require assistance, daughter lives locally .    Occupational Therapy Goals:  Initiated 2/21/2024  1.  Patient will perform self-feeding with [place] within 7 day(s).  2.  Patient will perform grooming with Modified [place] within 7 day(s).  3.  Patient will perform bathing with Stand by Assist within 7 day(s).  4.  Patient will perform toilet transfers with Modified [place]  within 7 day(s).  5.  Patient will perform all aspects of toileting with [place] within 7 day(s).  6.  Patient will participate in upper extremity therapeutic exercise/activities with [place] for 5 minutes within 7 day(s).    7.  Patient will utilize energy conservation techniques during functional activities with verbal cues within 7 day(s).   Outcome: Progressing   OCCUPATIONAL THERAPY EVALUATION    Patient: Milly Morse (78 y.o. female)  Date: 2/21/2024  Primary Diagnosis: Hypoxia [R09.02]  Acute respiratory failure with hypoxia (HCC) [J96.01]  Acute on chronic respiratory failure with hypoxia (HCC) [J96.21]         Precautions: Fall Risk                  ASSESSMENT :  The patient is limited by decreased functional mobility, independence in ADLs, high-level IADLs, strength, activity tolerance, endurance.Per chart review, pt was admitted for COPD exacerbation. Prior to admission pt reports living alone and being independent with ADLS/IADLS.     Upon arrival pt was received semi-reclined in bed, agreeable to therapy, 
  Problem: Occupational Therapy - Adult  Goal: By Discharge: Performs self-care activities at highest level of function for planned discharge setting.  See evaluation for individualized goals.  Description: FUNCTIONAL STATUS PRIOR TO ADMISSION:  Patient was ambulatory using no DME   , ADL Assistance: Independent,  ,  ,  ,  ,  , Homemaking Assistance: Independent, Ambulation Assistance: Independent, Transfer Assistance: Independent, Active : Yes     HOME SUPPORT: Patient lived alone but did not require assistance, daughter lives locally .    Occupational Therapy Goals:  Initiated 2/21/2024  1.  Patient will perform self-feeding with [place] within 7 day(s).  2.  Patient will perform grooming with Modified [place] within 7 day(s).  3.  Patient will perform bathing with Stand by Assist within 7 day(s).  4.  Patient will perform toilet transfers with Modified [place]  within 7 day(s).  5.  Patient will perform all aspects of toileting with [place] within 7 day(s).  6.  Patient will participate in upper extremity therapeutic exercise/activities with [place] for 5 minutes within 7 day(s).    7.  Patient will utilize energy conservation techniques during functional activities with verbal cues within 7 day(s).   Outcome: Progressing  OCCUPATIONAL THERAPY TREATMENT  Patient: Milly Morse (78 y.o. female)  Date: 2/26/2024  Primary Diagnosis: Hypoxia [R09.02]  Acute respiratory failure with hypoxia (HCC) [J96.01]  Acute on chronic respiratory failure with hypoxia (HCC) [J96.21]       Precautions: Fall Risk                Chart, occupational therapy assessment, plan of care, and goals were reviewed.    ASSESSMENT  Patient continues to benefit from skilled OT services and is progressing towards goals. Pt was received semisupine in bed, on 11 L O2 midflow NC. She was motivated to sit up in chair and wash her hair. Pt transferred supine>sit with mod I, then performed stand pivot transfer to chair 
  Problem: Occupational Therapy - Adult  Goal: By Discharge: Performs self-care activities at highest level of function for planned discharge setting.  See evaluation for individualized goals.  Description: FUNCTIONAL STATUS PRIOR TO ADMISSION:  Patient was ambulatory using no DME   , ADL Assistance: Independent,  ,  ,  ,  ,  , Homemaking Assistance: Independent, Ambulation Assistance: Independent, Transfer Assistance: Independent, Active : Yes     HOME SUPPORT: Patient lived alone but did not require assistance, daughter lives locally .    Occupational Therapy Goals:  Initiated 2/21/2024  1.  Patient will perform self-feeding with [place] within 7 day(s).  2.  Patient will perform grooming with Modified [place] within 7 day(s).  3.  Patient will perform bathing with Stand by Assist within 7 day(s).  4.  Patient will perform toilet transfers with Modified [place]  within 7 day(s).  5.  Patient will perform all aspects of toileting with [place] within 7 day(s).  6.  Patient will participate in upper extremity therapeutic exercise/activities with [place] for 5 minutes within 7 day(s).    7.  Patient will utilize energy conservation techniques during functional activities with verbal cues within 7 day(s).   Outcome: Progressing   OCCUPATIONAL THERAPY TREATMENT  Patient: Milly Morse (78 y.o. female)  Date: 2/23/2024  Primary Diagnosis: Hypoxia [R09.02]  Acute respiratory failure with hypoxia (HCC) [J96.01]  Acute on chronic respiratory failure with hypoxia (HCC) [J96.21]       Precautions: Fall Risk                Chart, occupational therapy assessment, plan of care, and goals were reviewed.    ASSESSMENT  Patient continues to benefit from skilled OT services and is progressing towards goals. Patient engaged in seated grooming, bathing, and dressing tasks - see below for details. She continues to be limited by decreased activity tolerance and is requiring up to 13LPM via midflow [place]. 
  Problem: Occupational Therapy - Adult  Goal: By Discharge: Performs self-care activities at highest level of function for planned discharge setting.  See evaluation for individualized goals.  Description: FUNCTIONAL STATUS PRIOR TO ADMISSION:  Patient was ambulatory using no DME   , ADL Assistance: Independent,  ,  ,  ,  ,  , Homemaking Assistance: Independent, Ambulation Assistance: Independent, Transfer Assistance: Independent, Active : Yes     HOME SUPPORT: Patient lived alone but did not require assistance, daughter lives locally .    Occupational Therapy Goals:  Initiated 2/21/2024  1.  Patient will perform self-feeding with [place] within 7 day(s).  2.  Patient will perform grooming with Modified [place] within 7 day(s).  3.  Patient will perform bathing with Stand by Assist within 7 day(s).  4.  Patient will perform toilet transfers with Modified [place]  within 7 day(s).  5.  Patient will perform all aspects of toileting with [place] within 7 day(s).  6.  Patient will participate in upper extremity therapeutic exercise/activities with [place] for 5 minutes within 7 day(s).    7.  Patient will utilize energy conservation techniques during functional activities with verbal cues within 7 day(s).   Outcome: Progressing   OCCUPATIONAL THERAPY TREATMENT: WEEKLY REASSESSMENT    Patient: Milly Morse (78 y.o. female)  Date: 2/28/2024  Primary Diagnosis: Hypoxia [R09.02]  Acute respiratory failure with hypoxia (HCC) [J96.01]  Acute on chronic respiratory failure with hypoxia (HCC) [J96.21]       Precautions: Fall Risk                Chart, occupational therapy assessment, plan of care, and goals were reviewed.    ASSESSMENT  Patient continues to benefit from skilled OT services and is slowly progressing towards goals. Patient received on 12L Midflow today with sats in lows 90s on arrival. Tolerance improved vs session yesterday with HR remaining in 110s largely throughout mobility 
  Problem: Occupational Therapy - Adult  Goal: By Discharge: Performs self-care activities at highest level of function for planned discharge setting.  See evaluation for individualized goals.  Description: FUNCTIONAL STATUS PRIOR TO ADMISSION:  Patient was ambulatory using no DME   , ADL Assistance: Independent,  ,  ,  ,  ,  , Homemaking Assistance: Independent, Ambulation Assistance: Independent, Transfer Assistance: Independent, Active : Yes     HOME SUPPORT: Patient lived alone but did not require assistance, daughter lives locally .    Occupational Therapy Goals:  Initiated 2/21/2024, reviewed at weekly reassessment 2/28/24 and goals remain appropriate  1.  Patient will perform self-feeding with [place] within 7 day(s).  2.  Patient will perform grooming with Modified [place] within 7 day(s).  3.  Patient will perform bathing with Stand by Assist within 7 day(s).  4.  Patient will perform toilet transfers with Modified [place]  within 7 day(s).  5.  Patient will perform all aspects of toileting with [place] within 7 day(s).  6.  Patient will participate in upper extremity therapeutic exercise/activities with [place] for 5 minutes within 7 day(s).    7.  Patient will utilize energy conservation techniques during functional activities with verbal cues within 7 day(s).   2/29/2024 1318 by Ira Aguirre OT  Outcome: Progressing   OCCUPATIONAL THERAPY TREATMENT  Patient: Milly Morse (78 y.o. female)  Date: 2/29/2024  Primary Diagnosis: Hypoxia [R09.02]  Acute respiratory failure with hypoxia (HCC) [J96.01]  Acute on chronic respiratory failure with hypoxia (HCC) [J96.21]       Precautions: Fall Risk                Chart, occupational therapy assessment, plan of care, and goals were reviewed.    ASSESSMENT  Patient continues to benefit from skilled OT services and is slowly progressing towards goals. Patient remains highly motivated to participate and is continually receptive to 
  Problem: Occupational Therapy - Adult  Goal: By Discharge: Performs self-care activities at highest level of function for planned discharge setting.  See evaluation for individualized goals.  Description: FUNCTIONAL STATUS PRIOR TO ADMISSION:  Patient was ambulatory using no DME   , ADL Assistance: Independent,  ,  ,  ,  ,  , Homemaking Assistance: Independent, Ambulation Assistance: Independent, Transfer Assistance: Independent, Active : Yes     HOME SUPPORT: Patient lived alone but did not require assistance, daughter lives locally .    Occupational Therapy Goals:  Initiated 2/21/2024, reviewed at weekly reassessment 2/28/24 and goals remain appropriate  1.  Patient will perform self-feeding with [place] within 7 day(s).  2.  Patient will perform grooming with Modified [place] within 7 day(s).  3.  Patient will perform bathing with Stand by Assist within 7 day(s).  4.  Patient will perform toilet transfers with Modified [place]  within 7 day(s).  5.  Patient will perform all aspects of toileting with [place] within 7 day(s).  6.  Patient will participate in upper extremity therapeutic exercise/activities with [place] for 5 minutes within 7 day(s).    7.  Patient will utilize energy conservation techniques during functional activities with verbal cues within 7 day(s).   Outcome: Not Progressing   OCCUPATIONAL THERAPY TREATMENT  Patient: Milly Morse (78 y.o. female)  Date: 3/1/2024  Primary Diagnosis: Hypoxia [R09.02]  Acute respiratory failure with hypoxia (HCC) [J96.01]  Acute on chronic respiratory failure with hypoxia (HCC) [J96.21]       Precautions: Fall Risk                Chart, occupational therapy assessment, plan of care, and goals were reviewed.    ASSESSMENT  Patient continues to benefit from skilled OT services and is not progressing towards goals. Patient significantly limited by respiratory status and poor endurance this session. Received on 8L with sats stable 
  Problem: Occupational Therapy - Adult  Goal: By Discharge: Performs self-care activities at highest level of function for planned discharge setting.  See evaluation for individualized goals.  Description: FUNCTIONAL STATUS PRIOR TO ADMISSION:  Patient was ambulatory using no DME   , ADL Assistance: Independent,  ,  ,  ,  ,  , Homemaking Assistance: Independent, Ambulation Assistance: Independent, Transfer Assistance: Independent, Active : Yes     HOME SUPPORT: Patient lived alone but did not require assistance, daughter lives locally .    Occupational Therapy Goals:  Initiated 2/21/2024, reviewed at weekly reassessment 2/28/24 and goals remain appropriate, goals reviewed and continued 3/6/2024:   1.  Patient will perform self-feeding with [place] within 7 day(s).  2.  Patient will perform grooming with Modified [place] within 7 day(s).  3.  Patient will perform bathing with Stand by Assist within 7 day(s).  4.  Patient will perform toilet transfers with Modified [place]  within 7 day(s).  5.  Patient will perform all aspects of toileting with [place] within 7 day(s).  6.  Patient will participate in upper extremity therapeutic exercise/activities with [place] for 5 minutes within 7 day(s).    7.  Patient will utilize energy conservation techniques during functional activities with verbal cues within 7 day(s).   Outcome: Progressing   OCCUPATIONAL THERAPY TREATMENT: WEEKLY REASSESSMENT    Patient: Milly Morse (78 y.o. female)  Date: 3/6/2024  Primary Diagnosis: Hypoxia [R09.02]  Acute respiratory failure with hypoxia (HCC) [J96.01]  Acute on chronic respiratory failure with hypoxia (HCC) [J96.21]       Precautions: Fall Risk                Chart, occupational therapy assessment, plan of care, and goals were reviewed.    ASSESSMENT  Patient continues to benefit from skilled OT services and is progressing towards goals. Pt's performance of ADL/IADL tasks continues to be limited by 
  Problem: Physical Therapy - Adult  Goal: By Discharge: Performs mobility at highest level of function for planned discharge setting.  See evaluation for individualized goals.  Description: FUNCTIONAL STATUS PRIOR TO ADMISSION: Patient was independent and active without use of DME. On 2-3LPM via [place] prior to admission. Recent admission with discharge home with HHPT.    HOME SUPPORT PRIOR TO ADMISSION: The patient lived alone with no significant local support. [place] is coming in from [place] (unsure if staying?)    Physical Therapy Goals  Initiated 2/21/2024  1.  Patient will move from supine to sit and sit to supine, scoot up and down, and roll side to side in bed with independence within 7 day(s).    2.  Patient will perform sit to stand with independence within 7 day(s).  3.  Patient will transfer from bed to chair and chair to bed with independence using the least restrictive device within 7 day(s).  4.  Patient will ambulate with modified independence for 150 feet with the least restrictive device within 7 day(s).   Outcome: Progressing     PHYSICAL THERAPY TREATMENT: WEEKLY REASSESSMENT    Patient: Milly Morse (78 y.o. female)  Date: 2/28/2024  Primary Diagnosis: Hypoxia [R09.02]  Acute respiratory failure with hypoxia (HCC) [J96.01]  Acute on chronic respiratory failure with hypoxia (HCC) [J96.21]       Precautions: Fall Risk                      ASSESSMENT :  Patient continues to benefit from skilled PT services and is slowly progressing towards goals. She is still requiring a lot of supplemental O2 at 9L midflow, and even with that level of O2 only tolerates short bouts of activity before HR in the 120s and SpO2 in the mid 80s.    Worked a lot on education about energy conservation and symptom management.  She notes that she does not typically feel short of breath, but does feel some fluttering in her chest.  Encouraged her to utilize that as her indication to take rest breaks and to fully rest when she rests 
  Problem: Physical Therapy - Adult  Goal: By Discharge: Performs mobility at highest level of function for planned discharge setting.  See evaluation for individualized goals.  Description: FUNCTIONAL STATUS PRIOR TO ADMISSION: Patient was independent and active without use of DME. On 2-3LPM via [place] prior to admission. Recent admission with discharge home with HHPT.    HOME SUPPORT PRIOR TO ADMISSION: The patient lived alone with no significant local support. Son is coming in from [place] (unsure if staying?)    Physical Therapy Goals  Initiated 2/21/2024  1.  Patient will move from supine to sit and sit to supine, scoot up and down, and roll side to side in bed with independence within 7 day(s).    2.  Patient will perform sit to stand with independence within 7 day(s).  3.  Patient will transfer from bed to chair and chair to bed with independence using the least restrictive device within 7 day(s).  4.  Patient will ambulate with modified independence for 150 feet with the least restrictive device within 7 day(s).   Outcome: Progressing   PHYSICAL THERAPY TREATMENT    Patient: Milly Morse (78 y.o. female)  Date: 2/23/2024  Diagnosis: Hypoxia [R09.02]  Acute respiratory failure with hypoxia (HCC) [J96.01]  Acute on chronic respiratory failure with hypoxia (HCC) [J96.21] Acute respiratory failure with hypoxia (HCC)      Precautions: Fall Risk                      ASSESSMENT:  Patient continues to benefit from skilled PT services and is slowly progressing towards goals. Pt continues to be limited primarily by impaired activity tolerance. She was received on 12 liters of 02 and her resting Sp02 was stable. Once sitting at the EOB her Sp02 dipped and fluctuated some but was mostly at 84% so I increased her 02 to 15 liters and after a few minutes of resting sitting and pursed lip breathing, her Sp02 dat to the low 90s. After a transfer to the chair her Sp02 again dropped and again hug out mostly at 84% and with rest and 
  Problem: Physical Therapy - Adult  Goal: By Discharge: Performs mobility at highest level of function for planned discharge setting.  See evaluation for individualized goals.  Description: FUNCTIONAL STATUS PRIOR TO ADMISSION: Patient was independent and active without use of DME. On 2-3LPM via [place] prior to admission. Recent admission with discharge home with HHPT.    HOME SUPPORT PRIOR TO ADMISSION: The patient lived alone with no significant local support. Son is coming in from [place] (unsure if staying?)    Physical Therapy Goals  Initiated 2/21/2024  1.  Patient will move from supine to sit and sit to supine, scoot up and down, and roll side to side in bed with independence within 7 day(s).    2.  Patient will perform sit to stand with independence within 7 day(s).  3.  Patient will transfer from bed to chair and chair to bed with independence using the least restrictive device within 7 day(s).  4.  Patient will ambulate with modified independence for 150 feet with the least restrictive device within 7 day(s).   Outcome: Progressing   PHYSICAL THERAPY TREATMENT    Patient: Milly Morse (78 y.o. female)  Date: 3/1/2024  Diagnosis: Hypoxia [R09.02]  Acute respiratory failure with hypoxia (HCC) [J96.01]  Acute on chronic respiratory failure with hypoxia (HCC) [J96.21] Acute respiratory failure with hypoxia (HCC)      Precautions: Fall Risk                      ASSESSMENT:  Patient continues to benefit from skilled PT services and is slowly progressing towards goals. Pt remains limited by impaired activity tolerance but is making gains. She was received on 6 liters of 02 and resting Sp02 was 91%. She amb 10 feet on the 6 liters using a rolling walker and sat and rested and her Sp02 dipped briefly to 87% but mostly was at 89% and then with rest and pursed lip breathing dat to low 90s. For second walk (also 10 feet with a rolling walker) had her on 8 liters of 02 and likewise post amb her Sp02 dipped to 87% but then 
  Problem: Physical Therapy - Adult  Goal: By Discharge: Performs mobility at highest level of function for planned discharge setting.  See evaluation for individualized goals.  Description: FUNCTIONAL STATUS PRIOR TO ADMISSION: Patient was independent and active without use of DME. On 2-3LPM via [place] prior to admission. Recent admission with discharge home with HHPT.    HOME SUPPORT PRIOR TO ADMISSION: The patient lived alone with no significant local support. Son is coming in from [place] (unsure if staying?)    Physical Therapy Goals  Initiated 2/21/2024  1.  Patient will move from supine to sit and sit to supine, scoot up and down, and roll side to side in bed with independence within 7 day(s).    2.  Patient will perform sit to stand with independence within 7 day(s).  3.  Patient will transfer from bed to chair and chair to bed with independence using the least restrictive device within 7 day(s).  4.  Patient will ambulate with modified independence for 150 feet with the least restrictive device within 7 day(s).   Outcome: Progressing   PHYSICAL THERAPY TREATMENT    Patient: Milly Morse (78 y.o. female)  Date: 3/4/2024  Diagnosis: Hypoxia [R09.02]  Acute respiratory failure with hypoxia (HCC) [J96.01]  Acute on chronic respiratory failure with hypoxia (HCC) [J96.21] Acute respiratory failure with hypoxia (HCC)      Precautions: Fall Risk                      ASSESSMENT:  Patient continues to benefit from skilled PT services and is slowly progressing towards goals. Pt remains limited primarily by impaired activity tolerance. She was received up to the chair on 6 liters of 02 and resting Sp02 was 92% and HR was 104 bpm. She participated in 10 feet of amb X 3 reps (with two seated rest breaks) using a rolling walker. Had to increase her to 8 liters of 02 after first 10 feet of amb ( desat to 86% and HR dat briefly to 139 bpm). She then participated in 2 more reps of 10 feet of amb (on 8 liters of 02) and the lowest 
  Problem: Physical Therapy - Adult  Goal: By Discharge: Performs mobility at highest level of function for planned discharge setting.  See evaluation for individualized goals.  Description: FUNCTIONAL STATUS PRIOR TO ADMISSION: Patient was independent and active without use of DME. On 2-3LPM via [place] prior to admission. Recent admission with discharge home with HHPT.    HOME SUPPORT PRIOR TO ADMISSION: The patient lived alone with no significant local support. Son is coming in from [place] (unsure if staying?)    Physical Therapy Goals  Initiated 2/21/2024  1.  Patient will move from supine to sit and sit to supine, scoot up and down, and roll side to side in bed with independence within 7 day(s).    2.  Patient will perform sit to stand with independence within 7 day(s).  3.  Patient will transfer from bed to chair and chair to bed with independence using the least restrictive device within 7 day(s).  4.  Patient will ambulate with modified independence for 150 feet with the least restrictive device within 7 day(s).   Outcome: Progressing   PHYSICAL THERAPY TREATMENT    Patient: Milly Morse (78 y.o. female)  Date: 3/5/2024  Diagnosis: Hypoxia [R09.02]  Acute respiratory failure with hypoxia (HCC) [J96.01]  Acute on chronic respiratory failure with hypoxia (HCC) [J96.21] Acute respiratory failure with hypoxia (HCC)      Precautions: Fall Risk                      ASSESSMENT:  Patient continues to benefit from skilled PT services and is slowly progressing towards goals. Pt tolerated session fairly well, and continues to be limited by decreased tolerance to activity, impaired balance and gait. Pt remained on 6L of oxygen during session and desaturates to 82% with exertion and requires >2 minutes to recover to 88-92%. Pt mobilizing at SBA level for transfers and short gait. Pt reported feeling more tired today. Pt will continue to benefit from IPR--pulmonary focus upon discharge.        PLAN:  Patient continues to 
Problem: Physical Therapy - Adult  Goal: By Discharge: Performs mobility at highest level of function for planned discharge setting.  See evaluation for individualized goals.  Description: FUNCTIONAL STATUS PRIOR TO ADMISSION: Patient was independent and active without use of DME. On 2-3LPM via [place] prior to admission. Recent admission with discharge home with HHPT.    HOME SUPPORT PRIOR TO ADMISSION: The patient lived alone with no significant local support. Son is coming in from [place] (unsure if staying?)    Physical Therapy Goals  Initiated 2/21/2024  1.  Patient will move from supine to sit and sit to supine, scoot up and down, and roll side to side in bed with independence within 7 day(s).    2.  Patient will perform sit to stand with independence within 7 day(s).  3.  Patient will transfer from bed to chair and chair to bed with independence using the least restrictive device within 7 day(s).  4.  Patient will ambulate with modified independence for 150 feet with the least restrictive device within 7 day(s).   Outcome: Progressing     PHYSICAL THERAPY EVALUATION    Patient: Milly Morse (78 y.o. female)  Date: 2/21/2024  Primary Diagnosis: Hypoxia [R09.02]  Acute respiratory failure with hypoxia (HCC) [J96.01]  Acute on chronic respiratory failure with hypoxia (HCC) [J96.21]       Precautions: Restrictions/Precautions: Fall Risk                    ASSESSMENT :   DEFICITS/IMPAIRMENTS:   The patient is limited by decreased functional mobility, independence in ADLs, high-level IADLs, activity tolerance, endurance. Patient is mobilizing overall at supervision to modified independence for transfer out of bed to chair but is significantly limited in ability to progress activity due to high oxygen needs and desaturation with minimal activity. Arrived to patient on 45L HHFNC FiO2 75% and completing breathing treatment with RT who subsequently decreased flow rate to 30L for session. She desaturates to low 80s with 
Problem: Physical Therapy - Adult  Goal: By Discharge: Performs mobility at highest level of function for planned discharge setting.  See evaluation for individualized goals.  Description: FUNCTIONAL STATUS PRIOR TO ADMISSION: Patient was independent and active without use of DME. On 2-3LPM via [place] prior to admission. Recent admission with discharge home with HHPT.    HOME SUPPORT PRIOR TO ADMISSION: The patient lived alone with no significant local support. Son is coming in from [place] (unsure if staying?)    Physical Therapy Goals  Initiated 2/21/2024  1.  Patient will move from supine to sit and sit to supine, scoot up and down, and roll side to side in bed with independence within 7 day(s).    2.  Patient will perform sit to stand with independence within 7 day(s).  3.  Patient will transfer from bed to chair and chair to bed with independence using the least restrictive device within 7 day(s).  4.  Patient will ambulate with modified independence for 150 feet with the least restrictive device within 7 day(s).   Outcome: Progressing     PHYSICAL THERAPY TREATMENT    Patient: Milly Morse (78 y.o. female)  Date: 2/22/2024  Diagnosis: Hypoxia [R09.02]  Acute respiratory failure with hypoxia (HCC) [J96.01]  Acute on chronic respiratory failure with hypoxia (HCC) [J96.21] Acute respiratory failure with hypoxia (HCC)      Precautions: Fall Risk                    ASSESSMENT:  Patient continues to benefit from skilled PT services and is slowly progressing towards goals. Mobility progression again limited by pulmonary status. Patient on 15LPM via high flow nasal cannula (\"mid flow\") upon arrival with SpO2 98%. Patient desaturates with minimal activity to 84% with supine to sit. Patient desaturated with minimal activity yesterday on HHFNC and given no way to increase current flow rate, patient placed on non-rebreather at 10LPM over nasal cannula for transfers which largely did keep SpO2 88% or greater except during 
Transfer Assistance: Independent, Active : Yes     HOME SUPPORT: Patient lived alone but did not require assistance, daughter lives locally .    Occupational Therapy Goals:  Initiated 2/21/2024, reviewed at weekly reassessment 2/28/24 and goals remain appropriate  1.  Patient will perform self-feeding with [place] within 7 day(s).  2.  Patient will perform grooming with Modified [place] within 7 day(s).  3.  Patient will perform bathing with Stand by Assist within 7 day(s).  4.  Patient will perform toilet transfers with Modified [place]  within 7 day(s).  5.  Patient will perform all aspects of toileting with [place] within 7 day(s).  6.  Patient will participate in upper extremity therapeutic exercise/activities with [place] for 5 minutes within 7 day(s).    7.  Patient will utilize energy conservation techniques during functional activities with verbal cues within 7 day(s).   2/29/2024 1318 by Ira Aguirre OT  Outcome: Progressing  2/29/2024 1316 by Ira Aguirre OT  Outcome: Progressing     Problem: Skin/Tissue Integrity  Goal: Absence of new skin breakdown  Description: 1.  Monitor for areas of redness and/or skin breakdown  2.  Assess vascular access sites hourly  3.  Every 4-6 hours minimum:  Change oxygen saturation probe site  4.  Every 4-6 hours:  If on nasal continuous positive airway pressure, respiratory therapy assess nares and determine need for appliance change or resting period.  3/1/2024 0236 by Bassam Omalley RN  Outcome: Progressing  2/29/2024 1425 by Sean Pate RN  Outcome: Progressing     Problem: Pain  Goal: Verbalizes/displays adequate comfort level or baseline comfort level  Outcome: Progressing     Problem: Chronic Conditions and Co-morbidities  Goal: Patient's chronic conditions and co-morbidity symptoms are monitored and maintained or improved  3/1/2024 0236 by Bassam Omalley RN  Outcome: Progressing  2/29/2024 1426 by Sean Pate, RN  Outcome: 
midflow satting 91% in reclined position. Once upright sitting with legs down pt immediately desats to 86% unable to recover >88% without 10L. Extended seated rest break taken with recovery to 92% on 10L. Pt then tachycardic with HR as high as 148 with brief  front of recliner chair to change wet brief and O2 sats to 81% on 10L. Recovers with 3 minute rest break and 10L to 92% and HR in 90s with RN in room to check on pt and notify MD of concerns with O2 weaning. Left on 10L in chair at end of session with RN. Will continue to follow and progress as able pending continued medical workup. IPR with pulmonary focus vs HH pending progress.           PLAN :  Patient continues to benefit from skilled intervention to address the above impairments.  Continue treatment per established plan of care to address goals.    Recommend with staff: OOB to recliner as tolerated. OOB to BSC with assist x1 for safety as vitals allow    Recommend next OT session: Progress mobility and ADLs as vitals allow    Recommendation for discharge: (in order for the patient to meet his/her long term goals): IP pulm vs HH pending progress    Other factors to consider for discharge: lives alone and unstable vitals    IF patient discharges home will need the following DME: continuing to assess with progress       SUBJECTIVE:   Patient stated “I want to be up more but my body just is saying no huh?.”    OBJECTIVE DATA SUMMARY:   Cognitive/Behavioral Status:  Orientation  Overall Orientation Status: Within Normal Limits  Orientation Level: Oriented X4  Cognition  Overall Cognitive Status: WNL    Functional Mobility and Transfers for ADLs:  Bed Mobility:        Transfers:   Transfer Training  Transfer Training: Yes  Sit to Stand: Stand-by assistance  Stand to Sit: Stand-by assistance           Balance:  Standing: Intact  Balance  Sitting: Intact  Standing: Intact  Standing - Static: Good      ADL Intervention:                                
Clinical Factors: for hygiene due to activity tolerance    Skin Care: Chlorhexidine wipes    Pain Rating:  None reported     Pain Intervention(s):   pain is at a level acceptable to the patient      Activity Tolerance:   Poor and desaturates with activity and requires oxygen  Please refer to the flowsheet for vital signs taken during this treatment.    After treatment:   Patient left in no apparent distress sitting up in chair, Call bell within reach, and nurse in room     COMMUNICATION/EDUCATION:   The patient's plan of care was discussed with: physical therapist and registered nurse         Thank you for this referral.  Naida Fatima OT  Minutes: 31    
breaks, desaturates with activity and requires oxygen, and see assessment    After treatment:   Patient left in no apparent distress sitting up in chair and Call bell within reach      COMMUNICATION/EDUCATION:   The patient's plan of care was discussed with: occupational therapist and registered nurse    Patient Education  Education Provided: Role of Therapy;Plan of Care;Fall Prevention Strategies  Education Provided Comments: pursed lip breathing and activity pacing  Education Method: Verbal  Barriers to Learning: None  Education Outcome: Verbalized understanding;Demonstrated understanding      LUIS SHARMA, PT  Minutes: 26   
assistance;Contact-guard assistance  Balance:               Balance  Sitting: Intact  Standing: Impaired  Standing - Static: Good  Standing - Dynamic: Fair;Good  Ambulation/Gait Training:                       Gait  Gait Training: Yes  Overall Level of Assistance: Contact-guard assistance;Stand-by assistance  Assistive Device: Gait belt  Base of Support: Narrowed  Speed/Kizzy: Slow  Step Length: Right shortened;Left shortened  Gait Abnormalities: Decreased step clearance                           Pain Ratin/10   Pain Intervention(s):       Activity Tolerance:   Fair , requires frequent rest breaks, observed shortness of breath on exertion, and desaturates with activity and requires oxygen    After treatment:   Patient left in no apparent distress sitting up in chair, Call bell within reach, and Side rails x3    COMMUNICATION/EDUCATION:   The patient's plan of care was discussed with: occupational therapist and registered nurse    Patient Education  Education Given To: Patient  Education Provided: Role of Therapy;Plan of Care  Education Method: Verbal  Barriers to Learning: None  Education Outcome: Verbalized understanding;Demonstrated understanding    Thank you for this referral.  Kate Robles PT, DPT  Minutes: 29      
distress sitting up in chair and Call bell within reach    COMMUNICATION/EDUCATION:   The patient's plan of care was discussed with: physical therapist and registered nurse    Patient Education  Education Given To: Patient  Education Provided: Energy Conservation;ADL Adaptive Strategies;Plan of Care;Home Exercise Program;Transfer Training  Education Method: Demonstration;Verbal;Teach Back  Barriers to Learning: None  Education Outcome: Verbalized understanding;Demonstrated understanding    Thank you for this referral.  Lesly Neil OT  Minutes: 24

## 2024-03-06 NOTE — DISCHARGE SUMMARY
Discharge Summary       PATIENT ID: Milly Morse  MRN: [id number]   YOB: 1945    DATE OF ADMISSION: 2/19/2024 12:35 PM    DATE OF DISCHARGE: 3/6/2024   PRIMARY CARE PROVIDER: Babak Roque MD     ATTENDING PHYSICIAN: Iam Cervantes  DISCHARGING PROVIDER: Iam Cervantes MD      CONSULTATIONS: IP CONSULT TO PULMONOLOGY  IP CONSULT TO CASE MANAGEMENT    PROCEDURES/SURGERIES: * No surgery found *    ADMISSION SUMMARY AND HOSPITAL COURSE:     Milly Morse is a 78 y.o. female with history of pulmonary fibrosis, COPD, chronic respiratory failure on 2 to 4 L nasal cannula, hypothyroidism who presented to [facility] from ED with complaints of shortness of breath.  Well-known to medicine service and was recently discharged 2 weeks ago for acute respiratory failure requiring BiPAP.  She had been in her usual state of health and had recovered well since hospital discharge consult 4 days ago when she noted mild dyspnea which has progressed gradually.     Patient was admitted for further evaluation.  Patient with prolonged hospitalization course due to her respiratory failure with waxing and waning oxygen and tenuous respiratory status.  Etiologies of her respiratory failure is multifactorial including COPD exacerbation, pulmonary fibrosis.  Before this presentation, patient has been on 2 to 4 L of oxygen at baseline.  Patient has been requiring mid flow oxygen at times during this hospitalization.  Patient's lowest oxygen requirement has been around 5 to 6 L when she is at rest however needing higher supplemental oxygen during activities such as PT.  Per pulmonology, patient's baseline is 5 to 6 L of oxygen requirement.  Pulmonology has ordered for higher oxygen concentrator that can accommodate up to 10 L which patient can continue to use at home.  Patient was treated with steroid, diuresis as well as bronchodilators during this hospitalization.  Patient is discharged to SNF, SNF can accommodate up to 10 L of

## 2024-03-06 NOTE — PROGRESS NOTES
Hospitalist Progress Note  Iam Cervantes MD  Answering service: [phone]        Date of Service:  2024  NAME:  Milly Morse  :  1945  MRN:  [id number]      Admission Summary:     Patient admitted with acute on chronic respiratory failure due to COPD exacerbation, patient with known history of pulmonary fibrosis.    Interval history / Subjective:     Patient is feeling much improved.     Assessment & Plan:     Acute respiratory failure  -Acute on chronic hypoxic respiratory failure, multifactorial including COPD exacerbation, pulmonary fibrosis  -Weaning oxygen down to 8 L  -Manage underlying etiologies, wean oxygen as tolerated    Acute exacerbation of COPD  -CTA of the chest negative for PE or pneumonia.  -Echo with normal EF, normal wall motion  -On bronchodilators, steroid, diuresis    Interstitial lung disease  -Per pulmonology, patient may be candidate for ILD clinical trials as outpatient  -Follow-up with pulmonology as outpatient    Hypothyroidism  -Continue Synthroid    GERD  -Continue PPI     Code status: Full  Prophylaxis: Lovenox  Care Plan discussed with: Patient  Anticipated Disposition: Likely more than 48 hours, plan for SNFwhen medically stable.  Central Line:   None             Review of Systems:   Pertinent items are noted in HPI.         Vital Signs:    Last 24hrs VS reviewed since prior progress note. Most recent are:  Vitals:    24 0905   BP: 137/70   Pulse:    Resp:    Temp:    SpO2:          Intake/Output Summary (Last 24 hours) at 2024 0918  Last data filed at 2024 1729  Gross per 24 hour   Intake --   Output 600 ml   Net -600 ml        Physical Examination:     I had a face to face encounter with this patient and independently examined them on 2024 as outlined below:          General : alert x 3, awake, no acute distress,   HEENT: PEERL, EOMI, moist mucus 
        [facility]ist [facility]                                                                                          Hospitalist Progress Note  Darinel Woodruff MD  Office Phone: [phone]        Date of Service:  2024  NAME:  Milly Morse  :  1945  MRN:  [id number]       Admission Summary:   78 y.o lady w/ COPD, pulm fibrosis, chronic hypoxic respiratory failure, recently discharged from here for respiratory failure, who presents with shortness of breath.       Interval history / Subjective:   Seen and examined this AM, feels unwell, currently on 15L O2 and becomes hypoxic with minimal movement or speaking.     Assessment & Plan:     Acute on chronic hypoxic respiratory failure  Acute COPD exacerbation  Pulmonary fibrosis  -high flow now  -continue inhaled bronchodilators, systemic steroids  -respiratory viral panel negative  -CTA negative for PE or PNA, stop cefepime  -consult pulmonary     Hypothyroidism  GERD  -continue synthroid, pantoprazole     Code status: full  Prophylaxis: sc Lovenox  Care Plan discussed with: pt rn  Anticipated Disposition:   Inpatient  Cardiac monitoring: Telemetry  Central Line:       CRITICAL CARE ATTESTATION:  I had a face to face encounter with the patient, reviewed and interpreted patient data including clinical events, labs, images, vital signs, I/O's, and examined patient.  I have discussed the case and the plan and management of the patient's care with the consulting services, the bedside nurses and necessary ancillary providers.       NOTE OF PERSONAL INVOLVEMENT IN CARE   This patient has a high probability of imminent, clinically significant deterioration, which requires the highest level of preparedness to intervene urgently. I participated in the decision-making and personally managed or directed the management of the following life and organ supporting interventions that required my frequent assessment to treat or prevent 
        [facility] [place] [facility]                                                                                          Hospitalist Progress Note  Marlene Melton MD  Office Phone: [phone]        Date of Service:  2024  NAME:  Milly Morse  :  1945  MRN:  [id number]       Admission Summary:   78 y.o lady w/ COPD, pulm fibrosis, chronic hypoxic respiratory failure, recently discharged from here for respiratory failure, who presents with shortness of breath.       Interval history / Subjective:   Patient seen and examined this afternoon, sitting up in a chair.    Family member brought in some pizza.   O2 requirement seems better today, patient is in good spirits.   Tachycardic with minimal exertion.     Assessment & Plan:       Acute on chronic hypoxic respiratory failure  Acute COPD exacerbation  Pulmonary fibrosis  -high flow now  -continue inhaled bronchodilators, systemic steroids  -respiratory viral panel negative  -CTA negative for PE or PNA, stop cefepime;  -consult pulmonary: -she may be a candidate for ILD clinical trials as an outpatient;  -:-Oxygen requirement, remains on 13 to 15 L, easily fatigued; encourage out of bed to chair; encourage I-S; continue to wean down oxygen; continue current dose of IV steroids, will likely be able to lower tomorrow;  -: lower IV steroids to BID (Solumedrol 40 mg);   -continue to taper steroids;   -repeat Echo :     Left Ventricle: Normal left ventricular systolic function with a visually estimated EF of 60 - 65%. Left ventricle size is normal. Mildly increased wall thickness. Normal wall motion.    Aortic Valve: Mild stenosis of the aortic valve. AV mean gradient is 22 mmHg. AV area by continuity VTI is 0.5 cm2.    Pericardium: The pericardium is normal. Small (<1 cm).     -Steroids per pulmonary;    Hypothyroidism:  -Continue levothyroxine;    GERD  -continue pantoprazole;       Code status: full  Prophylaxis: sc 
        [facility] [place] [facility]                                                                                          Hospitalist Progress Note  Darinel Woodruff MD  Office Phone: [phone]        Date of Service:  2024  NAME:  Milly Morse  :  1945  MRN:  [id number]       Admission Summary:   78 y.o lady w/ COPD, pulm fibrosis, chronic hypoxic respiratory failure, recently discharged from here for respiratory failure, who presents with shortness of breath.       Interval history / Subjective:   Feels slightly better than yesterday  Remains on HF O2, turned down to 30LPM     Assessment & Plan:     Acute on chronic hypoxic respiratory failure  Acute COPD exacerbation  Pulmonary fibrosis  -high flow wean as tolerated  -continue inhaled bronchodilators, systemic steroids  -respiratory viral panel negative  -CTA negative for PE or PNA, stop cefepime   -consult pulmonary     Hypothyroidism  GERD  -continue synthroid, pantoprazole     Code status: full  Prophylaxis: sc Lovenox  Care Plan discussed with: pt david peguero  Anticipated Disposition:   Inpatient  Cardiac monitoring: Telemetry  Central Line:       CRITICAL CARE ATTESTATION:  I had a face to face encounter with the patient, reviewed and interpreted patient data including clinical events, labs, images, vital signs, I/O's, and examined patient.  I have discussed the case and the plan and management of the patient's care with the consulting services, the bedside nurses and necessary ancillary providers.       NOTE OF PERSONAL INVOLVEMENT IN CARE   This patient has a high probability of imminent, clinically significant deterioration, which requires the highest level of preparedness to intervene urgently. I participated in the decision-making and personally managed or directed the management of the following life and organ supporting interventions that required my frequent assessment to treat or prevent imminent 
        [facility] [place] [facility]  [facility]ist [facility]                                                                                          Hospitalist Progress Note  Marlene Melton MD  Office Phone: [phone]        Date of Service:  2024  NAME:  Milly Morse  :  1945  MRN:  [id number]       Admission Summary:   78 y.o lady w/ COPD, pulm fibrosis, chronic hypoxic respiratory failure, recently discharged from here for respiratory failure, who presents with shortness of breath.       Interval history / Subjective:   No new complaints.   Secretions are a little looser now, and she is able to bring up more phlegm.  Nose feels dry and she had some blood when she blew her nose this morning.   Oxygen is humidified.    Still on 13-15 liters O2, desaturates frequently, but recovers quickly.     Assessment & Plan:       Acute on chronic hypoxic respiratory failure  Acute COPD exacerbation  Pulmonary fibrosis  -high flow now  -continue inhaled bronchodilators, systemic steroids  -respiratory viral panel negative  -CTA negative for PE or PNA, stop cefepime;  -consult pulmonary: -she may be a candidate for ILD clinical trials as an outpatient;  -:-Oxygen requirement, remains on 13 to 15 L, easily fatigued; encourage out of bed to chair; encourage I-S; continue to wean down oxygen; continue current dose of IV steroids, will likely be able to lower tomorrow;  -: lower IV steroids to BID (Solumedrol 40 mg);      Hypothyroidism:  -Continue levothyroxine;    GERD  -continue pantoprazole;       Code status: full  Prophylaxis: sc Lovenox  Care Plan discussed with: pt rn  Anticipated Disposition:   Inpatient  Cardiac monitoring: Telemetry  Central Line:                  Social Determinants of Health     Tobacco Use: Medium Risk (2024)    Patient History     Smoking Tobacco Use: Former     Smokeless Tobacco Use: Never     Passive Exposure: Not on file   Alcohol Use: Not At Risk (2024)    
        [facility] [place] [facility]                                                                                          Hospitalist Progress Note  Marlene Melton MD  Office Phone: [phone]        Date of Service:  2024  NAME:  Milly Morse  :  1945  MRN:  [id number]       Admission Summary:   78 y.o lady w/ COPD, pulm fibrosis, chronic hypoxic respiratory failure, recently discharged from here for respiratory failure, who presents with shortness of breath.       Interval history / Subjective:   Patient seen and examined this afternoon, sitting up in a chair.  She became very tachycardic when she tried to work with PT/OT.  She continues to require 9 to 10 L O2, desaturates easily with coughing, talking, minimal activity.  Repeat echo done, unfortunately does not comment on pulmonary hypertension, will reach out to cardiology.       Assessment & Plan:       Acute on chronic hypoxic respiratory failure  Acute COPD exacerbation  Pulmonary fibrosis  -high flow now  -continue inhaled bronchodilators, systemic steroids  -respiratory viral panel negative  -CTA negative for PE or PNA, stop cefepime;  -consult pulmonary: -she may be a candidate for ILD clinical trials as an outpatient;  -:-Oxygen requirement, remains on 13 to 15 L, easily fatigued; encourage out of bed to chair; encourage I-S; continue to wean down oxygen; continue current dose of IV steroids, will likely be able to lower tomorrow;  -: lower IV steroids to BID (Solumedrol 40 mg);   -continue to taper steroids;   -repeat Echo :     Left Ventricle: Normal left ventricular systolic function with a visually estimated EF of 60 - 65%. Left ventricle size is normal. Mildly increased wall thickness. Normal wall motion.    Aortic Valve: Mild stenosis of the aortic valve. AV mean gradient is 22 mmHg. AV area by continuity VTI is 0.5 cm2.    Pericardium: The pericardium is normal. Small (<1 cm).   
        [facility] [place] [facility]  [facility]                                                                                          Hospitalist Progress Note  Marlene Melton MD  Office Phone: [phone]        Date of Service:  2024  NAME:  Milly Morse  :  1945  MRN:  [id number]       Admission Summary:   78 y.o lady w/ COPD, pulm fibrosis, chronic hypoxic respiratory failure, recently discharged from here for respiratory failure, who presents with shortness of breath.       Interval history / Subjective:   :    Patient feels better today, still coughing, worse when she takes a deep breath and or when she speaks;   secretions seem to be better controlled and not as copious;  Patient reports recent diagnosis of UTI, was prescribed ciprofloxacin on 2/15, received about 3 days of treatment, ciprofloxacin was not continued on admission; she is afebrile, has no leukocytosis; UA was not collected on admission; discussed with patient that restarting antibiotics at this time would not be indicated; patient has been on doxycycline since admission for pulmonary reasons;  Weaning oxygen: Down to 10 L today;    :   Oxygen requirements down from 15 L yesterday to 13 L this morning.  She continues to have a severe cough, despite maximum dose of Tessalon and Mucinex.  She received some Ativan last night and was able to sleep better.     Assessment & Plan:       Acute on chronic hypoxic respiratory failure  Acute COPD exacerbation  Pulmonary fibrosis  -high flow now  -continue inhaled bronchodilators, systemic steroids  -respiratory viral panel negative  -CTA negative for PE or PNA, stop cefepime;  -consult pulmonary: -she may be a candidate for ILD clinical trials as an outpatient;     Hypothyroidism:  -Continue levothyroxine;    GERD  -continue pantoprazole;       Code status: full  Prophylaxis: sc Lovenox  Care Plan discussed with: pt rn  Anticipated Disposition:   Inpatient  Cardiac 
        [facility]  Hospitalist [facility]                                                                                          Hospitalist Progress Note  Yee Rosales MD  Office Phone: [phone]        Date of Service:  2024  NAME:  Milly Morse  :  1945  MRN:  [id number]       Admission Summary:   78 y.o lady w/ COPD, pulm fibrosis, chronic hypoxic respiratory failure, recently discharged from here for respiratory failure, who presents with shortness of breath.       Interval history / Subjective:   Slight better , still on HF 15L      Assessment & Plan:     Acute on chronic hypoxic respiratory failure  Acute COPD exacerbation  Pulmonary fibrosis  -high flow now  -continue inhaled bronchodilators, systemic steroids  -respiratory viral panel negative  -CTA negative for PE or PNA, stop cefepime  -consult pulmonary: -she may be a candidate for ILD clinical trials as an outpatient      Hypothyroidism  GERD  -continue synthroid, pantoprazole bid      Code status: full  Prophylaxis: sc Lovenox  Care Plan discussed with: pt rn  Anticipated Disposition:   Inpatient  Cardiac monitoring: Telemetry  Central Line:       CRITICAL CARE ATTESTATION:  I had a face to face encounter with the patient, reviewed and interpreted patient data including clinical events, labs, images, vital signs, I/O's, and examined patient.  I have discussed the case and the plan and management of the patient's care with the consulting services, the bedside nurses and necessary ancillary providers.       NOTE OF PERSONAL INVOLVEMENT IN CARE   This patient has a high probability of imminent, clinically significant deterioration, which requires the highest level of preparedness to intervene urgently. I participated in the decision-making and personally managed or directed the management of the following life and organ supporting interventions that required my frequent assessment to treat or prevent imminent 
    PULMONARY/CRITICAL CARE/SLEEP MEDICINE    Progress Note    Name: Milly Morse   : 1945   MRN: [id number]   Date: 3/1/2024      Subjective:     3/1  On O2 10 lpm  Desaturates on walking but does not feel short of breath.  At home on 2lpm continuous. Takes repeated breaks when walking.  No cough, sputum, wheezing.  Exam consistent with fibrotic crackles bilaterally.      Making progress today  Noted echo  Now on 6lpm   Sitting up in chair   Some drops with the transfer but recovers quickly       Remains on 10lpm with significant desaturations with ambulation and movement  Noses feels better today  pO2 was 50mmHg on ABG      Cough better, but now with nasal congestion and bleeding  Chest film yesterday was stable  Feels stagnant        Cough a little better after starting allergy medications  Down to midflow  Looks exhausted today        + cough, but has allergies as well  Down to 20lpm and 95% FiO2     Consult Note:     This patient has been seen and evaluated at the request of Dr. Woodruff for High-flow; SOB . Medical records and data reviewed.  Patient is a 78 y.o. female who presented to the hospital with complaints of shotness of breath and cough. Patient has been afebrile since admission.  Cough is persistent.  She has required escalation in oxygen requirement that is slowly now weaning down.  She has underlying history of combined pulmonary fibrosis and emphysema and is followed by Dr. Chicas in our practice.  She has had outpatient visits and failed to respond to outpatient therapy requiring hospital admission.  She has been receiving bronchodilators and high-dose Solu-Medrol.  She has been prescribed Ofev in the past but has not been able to take it for 2 months.  Shipment is expected in her home today and an order was entered for not on formulary patient supplied medications.  CT scan of the chest shows severe basilar fibrotic change and apical emphysematous change.  
    PULMONARY/CRITICAL CARE/SLEEP MEDICINE    Progress Note    Name: Milly Morse   : 1945   MRN: [id number]   Date: 2024      Subjective:       + cough, but has allergies as well  Down to 20lpm and 95% FiO2     Consult Note:     This patient has been seen and evaluated at the request of Dr. Woodruff for High-flow; SOB . Medical records and data reviewed.  Patient is a 78 y.o. female who presented to the hospital with complaints of shotness of breath and cough. Patient has been afebrile since admission.  Cough is persistent.  She has required escalation in oxygen requirement that is slowly now weaning down.  She has underlying history of combined pulmonary fibrosis and emphysema and is followed by Dr. Chicas in our practice.  She has had outpatient visits and failed to respond to outpatient therapy requiring hospital admission.  She has been receiving bronchodilators and high-dose Solu-Medrol.  She has been prescribed Ofev in the past but has not been able to take it for 2 months.  Shipment is expected in her home today and an order was entered for not on formulary patient supplied medications.  CT scan of the chest shows severe basilar fibrotic change and apical emphysematous change.  Historical echocardiograms had not should not shown presence of pulmonary hypertension, echocardiogram that was done in 2024 had shown normal right ventricular size and function with no evidence of pulmonary hypertension.  Microdata from this admission showed negative viral panel.  Sputum culture has not been checked.      Assessment:   Acute on chronic hypoxic respiratory failure  Exacerbated interstitial lung disease, underlying combined pulmonary fibrosis emphysema, on antifibrotic therapy at 100 mg twice daily of Ofev given side effects and GI intolerance from the higher dose  Family history of IPF  GERD With history of Nissen fundoplication in 2022  Medical problems per 
    PULMONARY/CRITICAL CARE/SLEEP MEDICINE    Progress Note    Name: Milly Morse   : 1945   MRN: [id number]   Date: 2024      Subjective:       Making progress today  Noted echo  Now on 6lpm   Sitting up in chair   Some drops with the transfer but recovers quickly       Remains on 10lpm with significant desaturations with ambulation and movement  Noses feels better today  pO2 was 50mmHg on ABG      Cough better, but now with nasal congestion and bleeding  Chest film yesterday was stable  Feels stagnant        Cough a little better after starting allergy medications  Down to midflow  Looks exhausted today        + cough, but has allergies as well  Down to 20lpm and 95% FiO2     Consult Note:     This patient has been seen and evaluated at the request of Dr. Woodruff for High-flow; SOB . Medical records and data reviewed.  Patient is a 78 y.o. female who presented to the hospital with complaints of shotness of breath and cough. Patient has been afebrile since admission.  Cough is persistent.  She has required escalation in oxygen requirement that is slowly now weaning down.  She has underlying history of combined pulmonary fibrosis and emphysema and is followed by Dr. Chicas in our practice.  She has had outpatient visits and failed to respond to outpatient therapy requiring hospital admission.  She has been receiving bronchodilators and high-dose Solu-Medrol.  She has been prescribed Ofev in the past but has not been able to take it for 2 months.  Shipment is expected in her home today and an order was entered for not on formulary patient supplied medications.  CT scan of the chest shows severe basilar fibrotic change and apical emphysematous change.  Historical echocardiograms had not should not shown presence of pulmonary hypertension, echocardiogram that was done in 2024 had shown normal right ventricular size and function with no evidence of pulmonary hypertension. 
   02/22/24 1137   Oxygen Therapy/Pulse Ox   O2 Device (S)  High flow nasal cannula   O2 Flow Rate (L/min) (S)  15 L/min   SpO2 (S)  92 %     Weaned   
1100: Pt assisted to chair by RN walked a few steps to chiar. No difficulties. HR and O2 remained WNL.     1300: Pt with PT stood up from chair  and O2 dropped to 81%. Pt sat back down and HR back to 92 and O2 92% on 10L midflow. Dr. Melton notified. No new orders received.  
Bedside shift change report given to BERNICE Cooley (oncoming nurse) by BERNICE Felder (offgoing nurse). Report included the following information Nurse Handoff Report, Adult Overview, Intake/Output, MAR, Recent Results, Cardiac Rhythm SR, Quality Measures, and Neuro Assessment.     
Bedside shift change report given to BERNICE Noel (oncoming nurse) by BERNICE Felder (offgoing nurse). Report included the following information Nurse Handoff Report, Adult Overview, Intake/Output, MAR, Recent Results, Cardiac Rhythm SR, Quality Measures, and Neuro Assessment.     
Clinical Pharmacy Note: Re: IV to PO Automatic Conversion - Antibiotic    Please note: Milly Morse’s medication- Doxycycline has been changed from IV to PO based on the following criteria:    The patient:  Received IV therapy for at least 24 hours   Is tolerating diet more advanced than clear liquids  Is tolerating oral medications  Is not on vasopressor blood pressure support (i.e. no signs of shock)      This IV to PO conversion is based on the [employer] approved automatic conversion policy for eligible patients.  Please call with questions.    
NUTRITION  Reason for Assessment: LOS      Recommendations/Interventions/Plan:   Continue regular diet    Will rescreen per policy       Past Medical History:   Diagnosis Date    Adverse effect of anesthesia     vocal cord swelling with bronchoscopy - was given lidocaine for bronchoscopy - unsure if lidocaine this caused the swelling/\"put me out the second time\" and did not use lidocaine and there no problem    COPD (chronic obstructive pulmonary disease) ([facility])     does not use oxygen, \"mild COPD\" per pt, CXR states otherwise    Ill-defined condition     uses 2 liters of oxygen    Ill-defined condition     pulmonary fibrosis    Menopause     Pulmonary fibrosis (HCC)     Thyroid disease     hypothryoid         Pt screened for LOS.  Chart/labs/meds reviewed.  Admitted with Hypoxia [R09.02]  Acute respiratory failure with hypoxia ([facility]) [J96.01]  Acute on chronic respiratory failure with hypoxia ([facility]) [J96.21]    3/6: happy to be d/c'ing today. Was unable to leave yesterday d/t O2 requirements.  Tolerating diet, reports PO overall better than documentation.  Labs OK including BG.  No new weight.    2/28: Visited with pt in room, receiving breathing treatment.  Currently on 9 L high flow, too high for d/c.    Pt has a good appetite, feels she is eating fine, however some issues with getting items she wants.  Very pleasant and understanding of hospital  systems.  Per pt, weight up from UBW per last bed scale, but she states it is now broken and they can't weigh her.  She isn't concerned.  No wt loss PTA.  Knows how to call for meals.  No food preferences/ avoidances offered.  She does drink Ensure at home, but declines to have here.  No wounds.  BG slightly high with steroids.  No overt nutritional concerns at this time.        Nutrition Related Findings:   Edema: None   Edema Generalized: Non-pitting        RLE Edema: Trace, Non-pitting  LLE Edema: Trace, Non-pitting      Last BM: 03/05/24      Skin: 
NUTRITION  Reason for Assessment: LOS      Recommendations/Interventions/Plan:   Continue regular diet    Will rescreen per policy       Past Medical History:   Diagnosis Date    Adverse effect of anesthesia     vocal cord swelling with bronchoscopy - was given lidocaine for bronchoscopy - unsure if lidocaine this caused the swelling/\"put me out the second time\" and did not use lidocaine and there no problem    COPD (chronic obstructive pulmonary disease) ([facility])     does not use oxygen, \"mild COPD\" per pt, CXR states otherwise    Ill-defined condition     uses 2 liters of oxygen    Ill-defined condition     pulmonary fibrosis    Menopause     Pulmonary fibrosis (HCC)     Thyroid disease     hypothryoid         Pt screened for LOS.  Chart/labs/meds reviewed.  Admitted with Hypoxia [R09.02]  Acute respiratory failure with hypoxia ([facility]) [J96.01]  Acute on chronic respiratory failure with hypoxia ([facility]) [J96.21]    Visited with pt in room, receiving breathing treatment.  Currently on 9 L high flow, too high for d/c.    Pt has a good appetite, feels she is eating fine, however some issues with getting items she wants.  Very pleasant and understanding of hospital  systems.  Per pt, weight up from UBW per last bed scale, but she states it is now broken and they can't weigh her.  She isn't concerned.  No wt loss PTA.  Knows how to call for meals.  No food preferences/ avoidances offered.  She does drink Ensure at home, but declines to have here.  No wounds.  BG slightly high with steroids.  No overt nutritional concerns at this time.        Nutrition Related Findings:   Edema: None   Edema Generalized: Trace                  Last BM: 02/28/24      Skin: intact      Current Nutrition Therapies:  Diet: regular  Supplements: none at this time  Meal Intake:   Patient Vitals for the past 168 hrs:   PO Meals Eaten (%)   02/27/24 0957 76 - 100%   02/23/24 0945 76 - 100%   02/22/24 1038 51 - 75%     Supplement Intake:  No 
Occupational Therapy    Chart reviewed and spoke with PT. Pt not medically stable due to respiratory status for OT intervention at this time per RN. Will defer and continue to follow. Thank you    Sanjuanita Baker MS OTR/L    
Occupational Therapy Contact Note  02/27/24    Have attempted x3 to see pt for OT tx. Each time staff at bedside completing imaging (ECHO this AM, XR currently) working with pt currently unavailable to participate. Will follow up later as able for therapy as time allows.    Thank you,  Ira Aguirre, OTD, OTR/L    
PCCM:    VSS    WBC 14.3    Plan:    Progressive ILD   Continue mobilization as able   O2 titration above 88%, please wean as able    Weaning prednisone    Oumar Arciniega PA-C   
PCCM:    VSS; O2 down to 6lpm     Na 140  Cr 0.83    Plan:  ILD flare   Transitioned to PO steroids, hold at dose  Continue diuertics for now. Seems to have helped the most  O2 titration above 90%  Continue OOB as tolerated     Oumar Arciniega PA-C   
Palliative Medicine      Code Status: Full Code    Advance Care Planning:    The patient shares she has an AMD at home naming her two sons, Keron and Bong, as MPOA- she does look to her DIL Mavis who is a RN heavily for support in decisions as well     Patient / Family Encounter Documentation    Participants (names): Dr. Anderson     Narrative: The Palliative Medicine SW and Dr. Anderson met with the patient at bedside to introduce role and offer support, the patient has good insight into her medical condition. Overall slowly has made improvement this admission- tolerating small amounts of PT/OT. She is interested and open to attending Pulmonary Rehab at discharge if that is an option, she shares that if she discharges home her son Keron and KYLE would be able to come stay with her for a short period of time for additional support.     The patient believes and has good insight into the emotional-body connection, she thinks that some of her health issues are attributed to grief- she has been managing her brother's estate and taking care of him, he  recently and this has taken a large emotional toll.     The patient has AMD at home naming her two sons- she also trusts her DIL, Emelia Campoverde, as a \"go to\" person- she shares her sons would involve her in decision making. She shares that they know her wishes- she would not want to live on a ventilator long-term, she would be accepting of temporary machines but not long term.     Support provided, our team will be following along with you- goals clear.     Psychosocial Issues Identified/ Resilience Factors: The patient lives alone independently, she has two sons- son Keron lives in [place] with spouse Emelia Campoverde who is also heavily involved, and son Bong lives locally. 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 The patient has had multiple losses this year- her 
Physical therapy services attempted @9:50AM. After preparing room for safe (line decluttering) EOB/OOB activity, Diagnostic/ECHO Team arrived indicating need to perform testing. While present, therapist observed Pt currently receiving 12L O2 via NC, SpO2 99%. Therapist offered assist with transfer to bedside chair, but Clinical Team indicated need to perform testing in bed. Pt/staff confirm no further needs prior to therapist departure. PT Team will try to provide skilled services at a later date as time permits and as medically appropriate to patient tolerance.    Vanessa Schroeder, PT, DPT    
Physical therapy:    Chart reviewed and spoke with RN. Pt not medically stable due to respiratory status for PT intervention at this time. Will defer and continue to follow. Thank you.    Kate Robles, PT, DPT    
Pt placed on 100% nrb mask for comfort, secondary to nose bleeding from mid flow nc.  
Pulmonary Disease Navigator Note  [facility]    Current GOLD classification for Milly Morse    Patient's chart was reviewed by Pulmonary Disease Navigator for compliance with prescribed treatment with Global Initiative For Chronic Obstructive Lung Disease (GOLD).    Please, review beneath recommendations for pharmacological treatment for patient with obstructive lung disease.     COPD Symptoms:    History of pulmonary fibrosis came in for shortness of breath. Also has a history of COPD. Patient had a recent admission that required the use of BiPAP. She was weaned off steroids over the last several days since her discharge on the 7th and has gotten progressively worse.     Suspected triggers include airborne allergens, animal allergens- dogs/cats, mold, and dust. Patient reports she that she is currently taking allergy shots.    She  reports that she has quit smoking. She has never used smokeless tobacco.      Prior PFTs: Yes - FEV1- 110%,FEV1/FVC-73.    Prior vaccination: Yes - pneumonia. Have not had the flu yet this year per doctor's recommendation.    Current pulmonologist:  Yes - .       Current Pharmacological Treatment:     Albuterol Nebulizer-BILL  Pulmicort nebulizer- ICS  Spiriva inhaler-LAMA  _____________________________________________________     GOLD Stage: PFT does not show a significant airflow-obstruction to meet staging guidelines  Current eosinophil count: 0  Recorded domestic exacerbations past 12 months: 2        Observed PIF:      Combination  ICS-LABA Inhaler Acceptable   Therapy  Device For Use   Salmeterol/fluticasone Advair  Diskus    Vilanterol/fluticasone  Breo  Ellipta    Formoterol/mometasone  Dulera MDI Yes   Formoterol/budesonide  Symbicort MDI Yes   Triple Therapy Recommended (For Group E) [id number]     Fluticasone/umeclidinium/vilanterol  Trelegy  Ellipta Recommended   Budesonide/glycopyrrolate/formoterol fumarate  Breztri  MDI Yes 
Referral source:   Milly Morse at [facility] in [facility] 4 IMCU 2.  attended rounds in the IMCU as part of the Interdisciplinary team where the patient's ongoing care was discussed. I reviewed the medical record as part of this encounter.     Outcome: Interdisciplinary team are aware of  availability and were encouraged to request support as needed.      Advised nurse to contact Spiritual Care for any further referrals.The  on-call can be reached at [phone]).     Rev. Jovana Kilpatrick MDiv, [facility]  Staff   
Referral source:   [facility] at [facility] in [facility] 4 IMCU 2.  attended rounds in the IMCU as part of the Interdisciplinary team where the patient's ongoing care was discussed. I reviewed the medical record as part of this encounter.     Outcome: Interdisciplinary team are aware of  availability and were encouraged to request support as needed.      Advised nurse to contact Spiritual Care for any further referrals.The  on-call can be reached at ([phone]).     Rev. Jovana Kilpatrick MDiv, [facility]  Staff   
Referral source:   Milly Morse at [facility] in [facility] 4 IMCU 2.  attended rounds in the IMCU as part of the Interdisciplinary team where the patient's ongoing care was discussed. I reviewed the medical record as part of this encounter.     Outcome: Interdisciplinary team are aware of  availability and were encouraged to request support as needed.      Advised nurse to contact Spiritual Care for any further referrals.The  on-call can be reached at ([phone]).     Rev. Jovana Kilpatrick MDiv, [facility]  Staff   
Spiritual Care Assessment/Progress Note  [facility]    Name: Milly Morse MRN: [id number]    Age: 78 y.o.     Sex: female   Language: English     Date: 2/23/2024            Total Time Calculated: 10 min              Spiritual Assessment begun in [address] 2  Service Provided For:: Patient and family together  Referral/Consult From:: Multi-disciplinary team  Encounter Overview/Reason : Attempted Encounter    Spiritual beliefs:      [x] Involved in a pilar tradition/spiritual practice:  Zoroastrian     [] Supported by a pilar community:      [] Claims no spiritual orientation:      [] Seeking spiritual identity:           [] Adheres to an individual form of spirituality:      [] Not able to assess:                Identified resources for coping and support system:   Support System: Unknown       [] Prayer                  [] Devotional reading               [] Music                  [] Guided Imagery     [] Pet visits                                        [] Other: (COMMENT)     Specific area/focus of visit   Encounter:    Crisis:    Spiritual/Emotional needs:    Ritual, Rites and Sacraments:    Grief, Loss, and Adjustments:    Ethics/Mediation:    Behavioral Health:    Palliative Care:    Advance Care Planning:           Narrative:  initiated visit to Milly Morse due to length of stay in [address].  Reviewed patient's medical record prior to visit. A medical provider was visiting with patient and loved one. Unable to spiritually assess. Spiritual Health will continue to follow and assess for spiritual/emotional support during this hospitalization as available.     For immediate spiritual care, please contact the  on-call at [phone]).    . Holly Mcgraw MDiv, MSW  Resident           
Spiritual Care Assessment/Progress Note  [facility]    Name: Milly Morse MRN: [id number]    Age: 78 y.o.     Sex: female   Language: English     Date: 3/2/2024            Total Time Calculated: 12 min              Spiritual Assessment begun in [address] 2  Service Provided For:: Patient, Friend  Referral/Consult From:: Multi-disciplinary team  Encounter Overview/Reason : Initial Encounter    Spiritual beliefs:      [x] Involved in a pilar tradition/spiritual practice: Hindu     [] Supported by a pilar community:      [] Claims no spiritual orientation:      [] Seeking spiritual identity:           [] Adheres to an individual form of spirituality:      [] Not able to assess:                Identified resources for coping and support system:   Support System: Friends/neighbors       [x] Prayer                  [] Devotional reading               [] Music                  [] Guided Imagery     [] Pet visits                                        [] Other: (COMMENT)     Specific area/focus of visit   Encounter:    Crisis:    Spiritual/Emotional needs:    Ritual, Rites and Sacraments:    Grief, Loss, and Adjustments:    Ethics/Mediation:    Behavioral Health:    Palliative Care:    Advance Care Planning:      Plan/Referrals: Developed Care Plan (see consult note)    Narrative:  initiated visit to Milly Morse due to length of stay in [address]. Reviewed the patient's medical record prior to this encounter. Friend Sylvie was present.     Assessment: Patient was experiencing spiritual well-being as evidenced by her visiting friend and her statement that she was well supported spiritually.  Patient's friends are a source of support.  Patient also has a community of people praying for her.  Provided active listening and a compassionate presence. Encouraged patient to continue to utilize her support system.  Patient named going home as what is most important to her. The prospect of going home 
clear  Neck: supple, no JVD, no meningeal signs  Chest: Clear to auscultation bilaterally   CVS: S1 S2 heard, Capillary refill less than 2 seconds  Abd: soft/ non tender, non distended, BS physiological,   Ext: no clubbing, no cyanosis, no edema, brisk 2+ DP pulses  Neuro/Psych: pleasant mood and affect, CN 2-12 grossly intact, sensory grossly within normal limit, Strength 5/5 in all extremities, DTR 1+ x 4  Skin: warm            Data Review:    Review and/or order of clinical lab test    I have independently reviewed and interpreted patient's lab and all other diagnostic data    Notes reviewed from all clinical/nonclinical/nursing services involved in patient's clinical care. Care coordination discussions were held with appropriate clinical/nonclinical/ nursing providers based on care coordination needs.     Labs:     No results for input(s): \"WBC\", \"HGB\", \"HCT\", \"PLT\" in the last 72 hours.    Recent Labs     03/02/24  0437      K 3.8      CO2 28   BUN 25*     No results for input(s): \"ALT\", \"TP\", \"ALB\", \"GLOB\", \"GGT\", \"AML\" in the last 72 hours.    Invalid input(s): \"SGOT\", \"GPT\", \"AP\", \"TBIL\", \"TBILI\", \"AMYP\", \"LPSE\", \"HLPSE\"  No results for input(s): \"INR\", \"APTT\" in the last 72 hours.    Invalid input(s): \"PTP\"   No results for input(s): \"TIBC\", \"FERR\" in the last 72 hours.    Invalid input(s): \"FE\", \"PSAT\"   No results found for: \"FOL\", \"RBCF\"   No results for input(s): \"PH\", \"PCO2\", \"PO2\" in the last 72 hours.  No results for input(s): \"CPK\" in the last 72 hours.    Invalid input(s): \"CPKMB\", \"CKNDX\", \"TROIQ\"  No results found for: \"CHOL\", \"CHOLX\", \"CHLST\", \"CHOLV\", \"HDL\", \"HDLC\", \"LDL\", \"LDLC\", \"TGLX\", \"TRIGL\"  No results found for: \"GLUCPOC\"  [unfilled]      Medications Reviewed:     Current Facility-Administered Medications   Medication Dose Route Frequency    calcium carbonate (TUMS) chewable tablet 500 mg  500 mg Oral TID PRN    predniSONE (DELTASONE) tablet 40 mg  40 mg Oral Daily    sodium 
   02/25/24 1200   BP:    Pulse: 69   Resp:    Temp:    SpO2:        No intake or output data in the 24 hours ending 02/25/24 1355       Physical Examination:     I had a face to face encounter with this patient and independently examined them on 2/25/2024 as outlined below:          Chronically-ill appearing, NAD  Anicteric sclerae, cyanotic lips  Neck supple  Heart RRR no MRG  Lungs diminished globally, expiratory wheeze, b/l fine crackles, +accessory muscle use on midflow  Abd soft NT ND bs+  Extr no edema      Data Review:    Review and/or order of clinical lab test  Review and/or order of tests in the radiology section of CPT  Review and/or order of tests in the medicine section of CPT      I have personally and independently reviewed all pertinent labs, diagnostic studies, imaging, and have provided independent interpretation of the same.     Labs:     Recent Labs     02/24/24 0724 02/25/24  0725   WBC 9.1 9.7   HGB 13.4 13.6   HCT 39.0 39.8    201       Recent Labs     02/24/24  0724 02/25/24  0725    141   K 4.2 4.4   * 110*   CO2 26 26   BUN 18 24*       Recent Labs     02/24/24  0724 02/25/24  0725   ALT 34 30   GLOB 3.4 3.2       No results for input(s): \"INR\", \"APTT\" in the last 72 hours.    Invalid input(s): \"PTP\"   No results for input(s): \"TIBC\", \"FERR\" in the last 72 hours.    Invalid input(s): \"FE\", \"PSAT\"   No results found for: \"FOL\", \"RBCF\"   No results for input(s): \"PH\", \"PCO2\", \"PO2\" in the last 72 hours.  No results for input(s): \"CPK\" in the last 72 hours.    Invalid input(s): \"CPKMB\", \"CKNDX\", \"TROIQ\"  No results found for: \"CHOL\", \"CHOLX\", \"CHLST\", \"CHOLV\", \"HDL\", \"HDLC\", \"LDL\", \"LDLC\", \"TGLX\", \"TRIGL\"  No results found for: \"GLUCPOC\"  [unfilled]    Notes reviewed from all clinical/nonclinical/nursing services involved in patient's clinical care. Care coordination discussions were held with appropriate clinical/nonclinical/ nursing providers based on care coordination 
  Output --   Net 75 ml          Physical Examination:     I had a face to face encounter with this patient and independently examined them on 2/23/2024 as outlined below:          Chronically-ill appearing, NAD  Anicteric sclerae, cyanotic lips  Neck supple  Heart RRR no MRG  Lungs diminished globally, expiratory wheeze, b/l fine crackles, +accessory muscle use on midflow  Abd soft NT ND bs+  Extr no edema      Data Review:    Review and/or order of clinical lab test  Review and/or order of tests in the radiology section of CPT  Review and/or order of tests in the medicine section of CPT      I have personally and independently reviewed all pertinent labs, diagnostic studies, imaging, and have provided independent interpretation of the same.     Labs:     Recent Labs     02/21/24  0642 02/22/24  0326   WBC 12.3* 10.2   HGB 13.2 12.9   HCT 40.4 38.8    175       Recent Labs     02/21/24  0642 02/22/24  0326    142   K 4.3 4.2   * 110*   CO2 24 24   BUN 18 21*   MG 2.7*  --        No results for input(s): \"ALT\", \"TP\", \"ALB\", \"GLOB\", \"GGT\", \"AML\" in the last 72 hours.    Invalid input(s): \"SGOT\", \"GPT\", \"AP\", \"TBIL\", \"TBILI\", \"AMYP\", \"LPSE\", \"HLPSE\"    No results for input(s): \"INR\", \"APTT\" in the last 72 hours.    Invalid input(s): \"PTP\"   No results for input(s): \"TIBC\", \"FERR\" in the last 72 hours.    Invalid input(s): \"FE\", \"PSAT\"   No results found for: \"FOL\", \"RBCF\"   No results for input(s): \"PH\", \"PCO2\", \"PO2\" in the last 72 hours.  No results for input(s): \"CPK\" in the last 72 hours.    Invalid input(s): \"CPKMB\", \"CKNDX\", \"TROIQ\"  No results found for: \"CHOL\", \"CHOLX\", \"CHLST\", \"CHOLV\", \"HDL\", \"HDLC\", \"LDL\", \"LDLC\", \"TGLX\", \"TRIGL\"  No results found for: \"GLUCPOC\"  [unfilled]    Notes reviewed from all clinical/nonclinical/nursing services involved in patient's clinical care. Care coordination discussions were held with appropriate clinical/nonclinical/ nursing providers based on care 
Surgical History:      has a past surgical history that includes Colonoscopy (9.2015); Colonoscopy (12/2018); other surgical history; Colonoscopy (N/A, 4/22/2021); Cholecystectomy; Tonsillectomy; Tubal ligation; and Colonoscopy (N/A, 12/3/2018).    Home Medications:     Prior to Admission medications    Medication Sig Start Date End Date Taking? Authorizing Provider   ciprofloxacin (CIPRO) 500 MG tablet Take 500 mg by mouth 2 times daily. Take one tablet by mouth twice daily.    Raquel Smart MD   ALBUTEROL IN Inhale 18 g into the lungs as needed (shortness of breath). Inhale two puffs by mouth every four horus as needed.    Raquel Smart MD   albuterol (PROVENTIL) (2.5 MG/3ML) 0.083% nebulizer solution Take 2.5 mg by nebulization every 4 hours as needed for Shortness of Breath or Wheezing (cough). Use 1 vial via nebulizer every 4 hours as needed for cough, wheezing, or shortness of breath.    Raquel Smart MD   budesonide (PULMICORT) 0.5 MG/2ML nebulizer suspension Take 1 ampule by nebulization 2 times daily. Use 1 vial via nebulizer twice daily.    Raquel Smart MD   esomeprazole Magnesium (NEXIUM) 20 MG PACK Take 40 mg by mouth daily.    Raquel Smart MD   Oxygen Concentrator Inhale 3 L/min into the lungs continuous.    Raquel Smart MD   predniSONE (DELTASONE) 20 MG tablet Take 2 tablets by mouth daily  Patient taking differently: Take 30 mg by mouth daily. Take 30mg daily x3 days; take 20mg daily x3 days; take 10mg daily x3 days; then stop. 2/8/24 3/9/24  Callie Cuadra MD   ipratropium (ATROVENT) 0.02 % nebulizer solution Take 2.5 mLs by nebulization every 8 hours as needed for Wheezing 2/7/24 2/17/24  Callie Cuadra MD   esomeprazole Magnesium (NEXIUM) 40 MG PACK Take 1 packet by mouth daily    Raquel Smart MD   gabapentin (NEURONTIN) 300 MG capsule Take 1 capsule by mouth 3 times daily. Max Daily Amount: 900 mg    aRquel Smart MD 
\"GLUCPOC\"  [unfilled]      Medications Reviewed:     Current Facility-Administered Medications   Medication Dose Route Frequency    promethazine-codeine (PHENERGAN with CODEINE) 6.25-10 MG/5ML syrup 5 mL  5 mL Oral Q4H PRN    ipratropium 0.5 mg-albuterol 2.5 mg (DUONEB) nebulizer solution 1 Dose  1 Dose Inhalation TID RT    calcium carbonate (TUMS) chewable tablet 500 mg  500 mg Oral TID PRN    predniSONE (DELTASONE) tablet 40 mg  40 mg Oral Daily    sodium chloride (OCEAN, BABY AYR) 0.65 % nasal spray 2 spray  2 spray Each Nostril Q2H PRN    furosemide (LASIX) tablet 20 mg  20 mg Oral Daily    sodium chloride (OCEAN, BABY AYR) 0.65 % nasal spray 2 spray  2 spray Each Nostril Q2H PRN    guaiFENesin (ROBITUSSIN) 100 MG/5ML liquid 200 mg  200 mg Oral Q4H PRN    albuterol (PROVENTIL) (2.5 MG/3ML) 0.083% nebulizer solution 2.5 mg  2.5 mg Nebulization Q6H PRN    LORazepam (ATIVAN) tablet 0.5 mg  0.5 mg Oral Q6H PRN    guaiFENesin (MUCINEX) extended release tablet 1,200 mg  1,200 mg Oral BID    benzonatate (TESSALON) capsule 200 mg  200 mg Oral TID    azelastine (ASTELIN) 0.1 % nasal spray 1 spray  1 spray Each Nostril BID    cetirizine (ZYRTEC) tablet 10 mg  10 mg Oral Daily    Nintedanib Esylate CAPS 100 mg (Patient Supplied)  100 mg Oral BID    arformoterol 15 mcg-budesonide 0.5 mg neb solution   Nebulization BID RT    pantoprazole (PROTONIX) tablet 40 mg  40 mg Oral BID    gabapentin (NEURONTIN) capsule 300 mg  300 mg Oral TID    levothyroxine (SYNTHROID) tablet 88 mcg  88 mcg Oral Daily    montelukast (SINGULAIR) tablet 10 mg  10 mg Oral Daily    sodium chloride flush 0.9 % injection 5-40 mL  5-40 mL IntraVENous 2 times per day    sodium chloride flush 0.9 % injection 5-40 mL  5-40 mL IntraVENous PRN    0.9 % sodium chloride infusion   IntraVENous PRN    potassium chloride (KLOR-CON) extended release tablet 40 mEq  40 mEq Oral PRN    Or    potassium bicarb-citric acid (EFFER-K) effervescent tablet 40 mEq  40 mEq Oral 
input(s): \"CPKMB\", \"CKNDX\", \"TROIQ\"  No results found for: \"CHOL\", \"CHOLX\", \"CHLST\", \"CHOLV\", \"HDL\", \"HDLC\", \"LDL\", \"LDLC\", \"TGLX\", \"TRIGL\"  No results found for: \"GLUCPOC\"  [unfilled]      Medications Reviewed:     Current Facility-Administered Medications   Medication Dose Route Frequency    ipratropium 0.5 mg-albuterol 2.5 mg (DUONEB) nebulizer solution 1 Dose  1 Dose Inhalation TID RT    calcium carbonate (TUMS) chewable tablet 500 mg  500 mg Oral TID PRN    predniSONE (DELTASONE) tablet 40 mg  40 mg Oral Daily    sodium chloride (OCEAN, BABY AYR) 0.65 % nasal spray 2 spray  2 spray Each Nostril Q2H PRN    furosemide (LASIX) tablet 20 mg  20 mg Oral Daily    sodium chloride (OCEAN, BABY AYR) 0.65 % nasal spray 2 spray  2 spray Each Nostril Q2H PRN    guaiFENesin (ROBITUSSIN) 100 MG/5ML liquid 200 mg  200 mg Oral Q4H PRN    albuterol (PROVENTIL) (2.5 MG/3ML) 0.083% nebulizer solution 2.5 mg  2.5 mg Nebulization Q6H PRN    LORazepam (ATIVAN) tablet 0.5 mg  0.5 mg Oral Q6H PRN    guaiFENesin (MUCINEX) extended release tablet 1,200 mg  1,200 mg Oral BID    benzonatate (TESSALON) capsule 200 mg  200 mg Oral TID    azelastine (ASTELIN) 0.1 % nasal spray 1 spray  1 spray Each Nostril BID    cetirizine (ZYRTEC) tablet 10 mg  10 mg Oral Daily    Nintedanib Esylate CAPS 100 mg (Patient Supplied)  100 mg Oral BID    arformoterol 15 mcg-budesonide 0.5 mg neb solution   Nebulization BID RT    pantoprazole (PROTONIX) tablet 40 mg  40 mg Oral BID    gabapentin (NEURONTIN) capsule 300 mg  300 mg Oral TID    levothyroxine (SYNTHROID) tablet 88 mcg  88 mcg Oral Daily    montelukast (SINGULAIR) tablet 10 mg  10 mg Oral Daily    sodium chloride flush 0.9 % injection 5-40 mL  5-40 mL IntraVENous 2 times per day    sodium chloride flush 0.9 % injection 5-40 mL  5-40 mL IntraVENous PRN    0.9 % sodium chloride infusion   IntraVENous PRN    potassium chloride (KLOR-CON) extended release tablet 40 mEq  40 mEq Oral PRN    Or    potassium 
tablet 40 mg  40 mg Oral Daily    sodium chloride (OCEAN, BABY AYR) 0.65 % nasal spray 2 spray  2 spray Each Nostril Q2H PRN    furosemide (LASIX) tablet 20 mg  20 mg Oral Daily    sodium chloride (OCEAN, BABY AYR) 0.65 % nasal spray 2 spray  2 spray Each Nostril Q2H PRN    guaiFENesin (ROBITUSSIN) 100 MG/5ML liquid 200 mg  200 mg Oral Q4H PRN    ipratropium 0.5 mg-albuterol 2.5 mg (DUONEB) nebulizer solution 1 Dose  1 Dose Inhalation 4x Daily RT    albuterol (PROVENTIL) (2.5 MG/3ML) 0.083% nebulizer solution 2.5 mg  2.5 mg Nebulization Q6H PRN    LORazepam (ATIVAN) tablet 0.5 mg  0.5 mg Oral Q6H PRN    guaiFENesin (MUCINEX) extended release tablet 1,200 mg  1,200 mg Oral BID    benzonatate (TESSALON) capsule 200 mg  200 mg Oral TID    azelastine (ASTELIN) 0.1 % nasal spray 1 spray  1 spray Each Nostril BID    cetirizine (ZYRTEC) tablet 10 mg  10 mg Oral Daily    Nintedanib Esylate CAPS 100 mg (Patient Supplied)  100 mg Oral BID    arformoterol 15 mcg-budesonide 0.5 mg neb solution   Nebulization BID RT    pantoprazole (PROTONIX) tablet 40 mg  40 mg Oral BID    gabapentin (NEURONTIN) capsule 300 mg  300 mg Oral TID    levothyroxine (SYNTHROID) tablet 88 mcg  88 mcg Oral Daily    montelukast (SINGULAIR) tablet 10 mg  10 mg Oral Daily    sodium chloride flush 0.9 % injection 5-40 mL  5-40 mL IntraVENous 2 times per day    sodium chloride flush 0.9 % injection 5-40 mL  5-40 mL IntraVENous PRN    0.9 % sodium chloride infusion   IntraVENous PRN    potassium chloride (KLOR-CON) extended release tablet 40 mEq  40 mEq Oral PRN    Or    potassium bicarb-citric acid (EFFER-K) effervescent tablet 40 mEq  40 mEq Oral PRN    Or    potassium chloride 10 mEq/100 mL IVPB (Peripheral Line)  10 mEq IntraVENous PRN    magnesium sulfate 2000 mg in 50 mL IVPB premix  2,000 mg IntraVENous PRN    enoxaparin (LOVENOX) injection 40 mg  40 mg SubCUTAneous Daily    ondansetron (ZOFRAN-ODT) disintegrating tablet 4 mg  4 mg Oral Q8H PRN    Or 
mg IntraVENous Q6H PRN    polyethylene glycol (GLYCOLAX) packet 17 g  17 g Oral Daily PRN    acetaminophen (TYLENOL) tablet 650 mg  650 mg Oral Q6H PRN    Or    acetaminophen (TYLENOL) suppository 650 mg  650 mg Rectal Q6H PRN     ______________________________________________________________________  EXPECTED LENGTH OF STAY: 14  ACTUAL LENGTH OF STAY:          12                 Iam Cervantes MD   
Normal in size. ESOPHAGUS: No wall thickening or dilatation. TRACHEA/BRONCHI: Patent. PLEURA: No effusion or pneumothorax. LUNGS: Severe emphysema with bullous disease most pronounced in the periphery of all lobes..Fibrosis is most significant at the lung bases. UPPER ABDOMEN: Partially imaged. No acute pathology. Hiatal Hernia BONES: No aggressive bone lesion or fracture.     No pulmonary embolism Severe emphysema     XR CHEST PORTABLE    Result Date: 2/19/2024  EXAM:  XR CHEST PORTABLE INDICATION: Shortness of breath COMPARISON: 2/2/2024 TECHNIQUE: Upright portable chest AP view FINDINGS: Subpleural opacities bilaterally have the appearance of chronic interstitial lung disease or scarring. No focal consolidation or pleural effusion. The cardiac silhouette is within normal limits. The pulmonary vasculature is within normal limits. The visualized bones and upper abdomen are age-appropriate.     Chronic pulmonary parenchymal changes. No evidence of pneumonia.       Encounter Date: 02/19/24   EKG 12 Lead   Result Value    Ventricular Rate 124    Atrial Rate 124    P-R Interval 126    QRS Duration 64    Q-T Interval 296    QTc Calculation (Bazett) 425    P Axis 42    R Axis 59    T Axis 38    Diagnosis      Sinus tachycardia with premature atrial complexes  Nonspecific ST and T wave abnormality  Abnormal ECG  When compared with ECG of 01-FEB-2024 06:03,  premature atrial complexes are now present  Vent. rate has increased BY  71 BPM  ST now depressed in Lateral leads  Nonspecific T wave abnormality, worse in Inferior leads  Confirmed by HIWOT Gardiner, [facility] ([id number]) on 2/19/2024 3:09:15 PM            Medical decision making:   I have reviewed the flowsheet and previous day's notes  Patient has acute or chronic illness that poses a threat to life or bodily function  Review and order of Clinical lab tests  Review and Order of Radiology tests  Independent visualization of Image  Reviewed Oxygen/ NiPPV  High Risk Drug 
evaluation of pulmonary embolism to the first subsegmental arterial level. There is no pulmonary embolism to this level. THYROID: No nodule. MEDIASTINUM: No mass or lymphadenopathy. BERNICE: No mass or lymphadenopathy. THORACIC AORTA: No aneurysm. HEART: Normal in size. ESOPHAGUS: No wall thickening or dilatation. TRACHEA/BRONCHI: Patent. PLEURA: No effusion or pneumothorax. LUNGS: Severe emphysema with bullous disease most pronounced in the periphery of all lobes..Fibrosis is most significant at the lung bases. UPPER ABDOMEN: Partially imaged. No acute pathology. Hiatal Hernia BONES: No aggressive bone lesion or fracture.     No pulmonary embolism Severe emphysema     XR CHEST PORTABLE    Result Date: 2/19/2024  EXAM:  XR CHEST PORTABLE INDICATION: Shortness of breath COMPARISON: 2/2/2024 TECHNIQUE: Upright portable chest AP view FINDINGS: Subpleural opacities bilaterally have the appearance of chronic interstitial lung disease or scarring. No focal consolidation or pleural effusion. The cardiac silhouette is within normal limits. The pulmonary vasculature is within normal limits. The visualized bones and upper abdomen are age-appropriate.     Chronic pulmonary parenchymal changes. No evidence of pneumonia.       Encounter Date: 02/19/24   EKG 12 Lead   Result Value    Ventricular Rate 124    Atrial Rate 124    P-R Interval 126    QRS Duration 64    Q-T Interval 296    QTc Calculation (Bazett) 425    P Axis 42    R Axis 59    T Axis 38    Diagnosis      Sinus tachycardia with premature atrial complexes  Nonspecific ST and T wave abnormality  Abnormal ECG  When compared with ECG of 01-FEB-2024 06:03,  premature atrial complexes are now present  Vent. rate has increased BY  71 BPM  ST now depressed in Lateral leads  Nonspecific T wave abnormality, worse in Inferior leads  Confirmed by HIWOT Gardiner, [facility] ([id number]) on 2/19/2024 3:09:15 PM            Medical decision making:   I have reviewed the flowsheet and previous 
better\")  s/p Nissen 2/22 Merrill Shayy  GERD related cough- much better after Nissen ( almost\" 90% better\")    > continue ofev 100 mg QD ( elevated LFTs at higher dose) + PNDS/allergies( continue nasal astelin and /flonase/nasal ipratropium + allergy shots ( shell allergy))  gabapentin 300 mg TID + Spiriva    > cough persistent- coughing through clinic visit. The only thing that has helped her was the Nissen- cough disappeared after that to the point we stopped gabapentin. Pt now endorses dysphagia- occurs with cough.....saw DR. Jacobs and no plans for repeat surgery  HRCT 12/2022 at  unchanged basilar fibrosis and UL emphysema and + basilar bronchiectasis  > ILD appears stable.  >gabapentin causing excessive sedation told to take two in AM 1 in PM  > will also refer to [facility] cough center    > refer to ENT- Dr. Warner for ? LPR related cough.  ENVIRONMENTAL ALLERGIES (Z91.09)   sees Dr. Montague at [facility] - on allegra/singulair/floase/astelin/chlorpherimine  COUGH (R05.9)   I do suspect the patient is dealing with an upper respiratory infection which has contributed to her cough and congestion.  HYPOTHYROID (E03.9)      Other Problems (Yaritza Mcdaniels; 2/12/2024 9:16 AM)  Patient has Advanced Medical Directive   .  COPD   .  No Advanced Medical Directive   .  HOW TO ACCESS HEALTH INFORMATION ONLINE (Z71.9)    NEEDS FLU VACCINE-GIVEN IN OFFICE (Z23)      Allergies (Yaritza Mcdaniels; 2/12/2024 9:16 AM)  Lidocaine *CHEMICALS*      Immunization History (Yaritza Mcdaniels; 2/12/2024 9:16 AM)  Not up to date      Family History (Yaritza Mcdaniels; 2/12/2024 9:16 AM)  Lung Disease   Brother. .  Heart attack   Maternal Grandmother. .  Completed Stroke   Mother. .    Social History (Yaritza Mcdaniels; 2/12/2024 9:16 AM)  Pets/Animals in home   None.  Exercise   3 x week.  No drug use    Tobacco use   Former smoker. 40yr hx 1ppd stopped  Marital status   Single. .  Alcohol use   Moderate alcohol use. .    Medication 
glasses/contact lenses.  Respiratory Present- Cough. Not Present- Coughing blood, Daytime sleepiness, Shortness of breath, Snoring and Wheezing.  Cardiovascular Not Present- Chest pain, Palpitations and Swelling of Extremities.  Gastrointestinal Not Present- Abdominal Pain, Constipation, Diarrhea, Indigestion, Nausea and Vomiting.  Female Genitourinary Not Present- Blood in Urine, Frequency, Incontinence, Painful Urination and Urinating at Night.  Musculoskeletal Not Present- Joint Pain, Joint Stiffness and Joint Swelling.  Neurological Not Present- Headaches, Numbness and Weakness.  Psychiatric Not Present- Anxiety, Depression and Hallucinations.  Endocrine Not Present- Cold Intolerance, Excessive Thirst and Heat Intolerance.  Hematology Not Present- Abnormal Bleeding, Easy Bruising and Swollen glands.    Vitals (Yaritza Mcdaniels; 2/12/2024 9:16 AM)  2/12/2024 9:16 AM  Weight: 163 lb   Height: 61 in   Body Surface Area: 1.73 m²   Body Mass Index: 30.8 kg/m²    Pulse: 82 (Regular)    P.OX: 98% (3L O2, FiO2: 32%)  BP: 131/82(Sitting, Left Arm, Standard)              Physical Exam (Catracho Chicas MD; 2/13/2024 12:57 PM)  Chest and Lung Exam  Constitutional - alert, cooperative, well nourished and no acute distress.  Skin - normal moisture and normal warmth.  Head and Neck - normocephalic, atraumatic, trachea midline and thyroid normal.  ENMT - external ears have normal appearance, external nose is normal, oral mucosa is moist and oropharynx clear.  Chest and Lungs  Auscultation - rales, no wheezes and no rhonchi. Palpation - chest wall non-tender. Inspection - normal shape and normal respiratory effort.  Cardiovascular  Auscultation - no heart murmurs and rate regular. Inspection - jugular veins nondistended and no lower extremity edema.  Peripheral Vascular - normal gait and no digital clubbing.  Neuropsychiatric - oriented x 3, fluent, appropriate mood and affect and good insight.  Lymphatic - no neck

## 2024-03-29 ENCOUNTER — APPOINTMENT (OUTPATIENT)
Facility: HOSPITAL | Age: 79
End: 2024-03-29
Payer: MEDICARE

## 2024-03-29 ENCOUNTER — HOSPITAL ENCOUNTER (INPATIENT)
Facility: HOSPITAL | Age: 79
LOS: 8 days | Discharge: HOME | End: 2024-04-06
Attending: EMERGENCY MEDICINE | Admitting: INTERNAL MEDICINE
Payer: MEDICARE

## 2024-03-29 DIAGNOSIS — Z87.09 HISTORY OF COPD: ICD-10-CM

## 2024-03-29 DIAGNOSIS — J84.112 IPF (IDIOPATHIC PULMONARY FIBROSIS) (HCC): ICD-10-CM

## 2024-03-29 DIAGNOSIS — Z87.09 HISTORY OF PULMONARY FIBROSIS: ICD-10-CM

## 2024-03-29 DIAGNOSIS — J96.01 ACUTE RESPIRATORY FAILURE WITH HYPOXIA (HCC): Primary | ICD-10-CM

## 2024-03-29 LAB
ALBUMIN SERPL-MCNC: 3.5 G/DL (ref 3.5–5)
ALBUMIN/GLOB SERPL: 0.9 (ref 1.1–2.2)
ALP SERPL-CCNC: 78 U/L (ref 45–117)
ALT SERPL-CCNC: 40 U/L (ref 12–78)
ANION GAP SERPL CALC-SCNC: 7 MMOL/L (ref 5–15)
APPEARANCE UR: CLEAR
ARTERIAL PATENCY WRIST A: POSITIVE
AST SERPL-CCNC: 39 U/L (ref 15–37)
BACTERIA URNS QL MICRO: NEGATIVE /HPF
BASE EXCESS BLD CALC-SCNC: 1.7 MMOL/L
BASOPHILS # BLD: 0.1 K/UL (ref 0–0.1)
BASOPHILS NFR BLD: 0 % (ref 0–1)
BDY SITE: ABNORMAL
BILIRUB SERPL-MCNC: 1.1 MG/DL (ref 0.2–1)
BILIRUB UR QL: NEGATIVE
BUN SERPL-MCNC: 14 MG/DL (ref 6–20)
BUN/CREAT SERPL: 12 (ref 12–20)
CALCIUM SERPL-MCNC: 9.5 MG/DL (ref 8.5–10.1)
CHLORIDE SERPL-SCNC: 104 MMOL/L (ref 97–108)
CO2 SERPL-SCNC: 29 MMOL/L (ref 21–32)
COLOR UR: NORMAL
COMMENT:: NORMAL
CREAT SERPL-MCNC: 1.14 MG/DL (ref 0.55–1.02)
DIFFERENTIAL METHOD BLD: ABNORMAL
EOSINOPHIL # BLD: 0 K/UL (ref 0–0.4)
EOSINOPHIL NFR BLD: 0 % (ref 0–7)
EPITH CASTS URNS QL MICRO: NORMAL /LPF
ERYTHROCYTE [DISTWIDTH] IN BLOOD BY AUTOMATED COUNT: 16.4 % (ref 11.5–14.5)
GAS FLOW.O2 O2 DELIVERY SYS: ABNORMAL
GLOBULIN SER CALC-MCNC: 4 G/DL (ref 2–4)
GLUCOSE SERPL-MCNC: 110 MG/DL (ref 65–100)
GLUCOSE UR STRIP.AUTO-MCNC: NEGATIVE MG/DL
HCO3 BLD-SCNC: 24.8 MMOL/L (ref 22–26)
HCT VFR BLD AUTO: 39.4 % (ref 35–47)
HGB BLD-MCNC: 12.8 G/DL (ref 11.5–16)
HGB UR QL STRIP: NEGATIVE
HYALINE CASTS URNS QL MICRO: NORMAL /LPF (ref 0–5)
IMM GRANULOCYTES # BLD AUTO: 0.2 K/UL (ref 0–0.04)
IMM GRANULOCYTES NFR BLD AUTO: 1 % (ref 0–0.5)
INR PPP: 1 (ref 0.9–1.1)
KETONES UR QL STRIP.AUTO: NEGATIVE MG/DL
LACTATE SERPL-SCNC: 3.5 MMOL/L (ref 0.4–2)
LEUKOCYTE ESTERASE UR QL STRIP.AUTO: NEGATIVE
LYMPHOCYTES # BLD: 1.2 K/UL (ref 0.8–3.5)
LYMPHOCYTES NFR BLD: 8 % (ref 12–49)
MAGNESIUM SERPL-MCNC: 2.2 MG/DL (ref 1.6–2.4)
MCH RBC QN AUTO: 33 PG (ref 26–34)
MCHC RBC AUTO-ENTMCNC: 32.5 G/DL (ref 30–36.5)
MCV RBC AUTO: 101.5 FL (ref 80–99)
MONOCYTES # BLD: 0.9 K/UL (ref 0–1)
MONOCYTES NFR BLD: 6 % (ref 5–13)
NEUTS SEG # BLD: 12.3 K/UL (ref 1.8–8)
NEUTS SEG NFR BLD: 84 % (ref 32–75)
NITRITE UR QL STRIP.AUTO: NEGATIVE
NRBC # BLD: 0.03 K/UL (ref 0–0.01)
NRBC BLD-RTO: 0.2 PER 100 WBC
PCO2 BLD: 33.5 MMHG (ref 35–45)
PH BLD: 7.48 (ref 7.35–7.45)
PH UR STRIP: 6.5 (ref 5–8)
PLATELET # BLD AUTO: 178 K/UL (ref 150–400)
PO2 BLD: 84 MMHG (ref 80–100)
POTASSIUM SERPL-SCNC: 4.3 MMOL/L (ref 3.5–5.1)
PROCALCITONIN SERPL-MCNC: 0.09 NG/ML
PROT SERPL-MCNC: 7.5 G/DL (ref 6.4–8.2)
PROT UR STRIP-MCNC: NEGATIVE MG/DL
PROTHROMBIN TIME: 10.3 SEC (ref 9–11.1)
RBC # BLD AUTO: 3.88 M/UL (ref 3.8–5.2)
RBC #/AREA URNS HPF: NORMAL /HPF (ref 0–5)
SAO2 % BLD: 97 % (ref 92–97)
SODIUM SERPL-SCNC: 140 MMOL/L (ref 136–145)
SP GR UR REFRACTOMETRY: 1.01 (ref 1–1.03)
SPECIMEN HOLD: NORMAL
SPECIMEN TYPE: ABNORMAL
TROPONIN I SERPL HS-MCNC: 9 NG/L (ref 0–51)
URINE CULTURE IF INDICATED: NORMAL
UROBILINOGEN UR QL STRIP.AUTO: 0.2 EU/DL (ref 0.2–1)
WBC # BLD AUTO: 14.6 K/UL (ref 3.6–11)
WBC URNS QL MICRO: NORMAL /HPF (ref 0–4)

## 2024-03-29 PROCEDURE — 2000000000 HC ICU R&B

## 2024-03-29 PROCEDURE — 81001 URINALYSIS AUTO W/SCOPE: CPT

## 2024-03-29 PROCEDURE — 84484 ASSAY OF TROPONIN QUANT: CPT

## 2024-03-29 PROCEDURE — 36415 COLL VENOUS BLD VENIPUNCTURE: CPT

## 2024-03-29 PROCEDURE — 2700000000 HC OXYGEN THERAPY PER DAY

## 2024-03-29 PROCEDURE — 2580000003 HC RX 258: Performed by: INTERNAL MEDICINE

## 2024-03-29 PROCEDURE — 85025 COMPLETE CBC W/AUTO DIFF WBC: CPT

## 2024-03-29 PROCEDURE — 71045 X-RAY EXAM CHEST 1 VIEW: CPT

## 2024-03-29 PROCEDURE — 85610 PROTHROMBIN TIME: CPT

## 2024-03-29 PROCEDURE — 2580000003 HC RX 258: Performed by: EMERGENCY MEDICINE

## 2024-03-29 PROCEDURE — 93005 ELECTROCARDIOGRAM TRACING: CPT | Performed by: EMERGENCY MEDICINE

## 2024-03-29 PROCEDURE — 80053 COMPREHEN METABOLIC PANEL: CPT

## 2024-03-29 PROCEDURE — 83735 ASSAY OF MAGNESIUM: CPT

## 2024-03-29 PROCEDURE — 82803 BLOOD GASES ANY COMBINATION: CPT

## 2024-03-29 PROCEDURE — 36600 WITHDRAWAL OF ARTERIAL BLOOD: CPT

## 2024-03-29 PROCEDURE — 84145 PROCALCITONIN (PCT): CPT

## 2024-03-29 PROCEDURE — 83605 ASSAY OF LACTIC ACID: CPT

## 2024-03-29 PROCEDURE — 71250 CT THORAX DX C-: CPT

## 2024-03-29 PROCEDURE — 99285 EMERGENCY DEPT VISIT HI MDM: CPT

## 2024-03-29 PROCEDURE — 87040 BLOOD CULTURE FOR BACTERIA: CPT

## 2024-03-29 PROCEDURE — 6360000002 HC RX W HCPCS: Performed by: EMERGENCY MEDICINE

## 2024-03-29 RX ORDER — MAGNESIUM SULFATE IN WATER 40 MG/ML
2000 INJECTION, SOLUTION INTRAVENOUS PRN
Status: DISCONTINUED | OUTPATIENT
Start: 2024-03-29 | End: 2024-04-05

## 2024-03-29 RX ORDER — IPRATROPIUM BROMIDE AND ALBUTEROL SULFATE 2.5; .5 MG/3ML; MG/3ML
2 SOLUTION RESPIRATORY (INHALATION)
Status: DISPENSED | OUTPATIENT
Start: 2024-03-29 | End: 2024-03-29

## 2024-03-29 RX ORDER — 0.9 % SODIUM CHLORIDE 0.9 %
1000 INTRAVENOUS SOLUTION INTRAVENOUS ONCE
Status: COMPLETED | OUTPATIENT
Start: 2024-03-29 | End: 2024-03-29

## 2024-03-29 RX ORDER — SODIUM CHLORIDE 9 MG/ML
INJECTION, SOLUTION INTRAVENOUS PRN
Status: DISCONTINUED | OUTPATIENT
Start: 2024-03-29 | End: 2024-04-02

## 2024-03-29 RX ORDER — SODIUM CHLORIDE 0.9 % (FLUSH) 0.9 %
5-40 SYRINGE (ML) INJECTION EVERY 12 HOURS SCHEDULED
Status: DISCONTINUED | OUTPATIENT
Start: 2024-03-29 | End: 2024-04-05

## 2024-03-29 RX ORDER — SODIUM CHLORIDE 0.9 % (FLUSH) 0.9 %
5-40 SYRINGE (ML) INJECTION PRN
Status: DISCONTINUED | OUTPATIENT
Start: 2024-03-29 | End: 2024-04-06 | Stop reason: HOSPADM

## 2024-03-29 RX ORDER — ONDANSETRON 4 MG/1
4 TABLET, ORALLY DISINTEGRATING ORAL EVERY 8 HOURS PRN
Status: DISCONTINUED | OUTPATIENT
Start: 2024-03-29 | End: 2024-04-06 | Stop reason: HOSPADM

## 2024-03-29 RX ORDER — POTASSIUM CHLORIDE 7.45 MG/ML
10 INJECTION INTRAVENOUS PRN
Status: DISCONTINUED | OUTPATIENT
Start: 2024-03-29 | End: 2024-04-01

## 2024-03-29 RX ORDER — ENOXAPARIN SODIUM 100 MG/ML
40 INJECTION SUBCUTANEOUS DAILY
Status: DISCONTINUED | OUTPATIENT
Start: 2024-03-30 | End: 2024-04-05

## 2024-03-29 RX ORDER — 0.9 % SODIUM CHLORIDE 0.9 %
1000 INTRAVENOUS SOLUTION INTRAVENOUS ONCE
Status: DISCONTINUED | OUTPATIENT
Start: 2024-03-29 | End: 2024-03-29

## 2024-03-29 RX ORDER — ONDANSETRON 2 MG/ML
4 INJECTION INTRAMUSCULAR; INTRAVENOUS EVERY 6 HOURS PRN
Status: DISCONTINUED | OUTPATIENT
Start: 2024-03-29 | End: 2024-04-06 | Stop reason: HOSPADM

## 2024-03-29 RX ORDER — ACETAMINOPHEN 650 MG/1
650 SUPPOSITORY RECTAL EVERY 6 HOURS PRN
Status: DISCONTINUED | OUTPATIENT
Start: 2024-03-29 | End: 2024-04-06 | Stop reason: HOSPADM

## 2024-03-29 RX ORDER — POLYETHYLENE GLYCOL 3350 17 G/17G
17 POWDER, FOR SOLUTION ORAL DAILY PRN
Status: DISCONTINUED | OUTPATIENT
Start: 2024-03-29 | End: 2024-04-06 | Stop reason: HOSPADM

## 2024-03-29 RX ORDER — ACETAMINOPHEN 325 MG/1
650 TABLET ORAL EVERY 6 HOURS PRN
Status: DISCONTINUED | OUTPATIENT
Start: 2024-03-29 | End: 2024-04-06 | Stop reason: HOSPADM

## 2024-03-29 RX ORDER — POTASSIUM CHLORIDE 29.8 MG/ML
20 INJECTION INTRAVENOUS PRN
Status: DISCONTINUED | OUTPATIENT
Start: 2024-03-29 | End: 2024-04-01

## 2024-03-29 RX ADMIN — SODIUM CHLORIDE, PRESERVATIVE FREE 10 ML: 5 INJECTION INTRAVENOUS at 12:48

## 2024-03-29 RX ADMIN — SODIUM CHLORIDE 1000 ML: 9 INJECTION, SOLUTION INTRAVENOUS at 12:47

## 2024-03-29 RX ADMIN — SODIUM CHLORIDE, PRESERVATIVE FREE 10 ML: 5 INJECTION INTRAVENOUS at 21:12

## 2024-03-29 RX ADMIN — VANCOMYCIN HYDROCHLORIDE 1500 MG: 1.5 INJECTION, POWDER, LYOPHILIZED, FOR SOLUTION INTRAVENOUS at 12:45

## 2024-03-29 RX ADMIN — SODIUM CHLORIDE 1000 ML: 9 INJECTION, SOLUTION INTRAVENOUS at 12:51

## 2024-03-29 RX ADMIN — WATER 125 MG: 1 INJECTION INTRAMUSCULAR; INTRAVENOUS; SUBCUTANEOUS at 12:33

## 2024-03-29 RX ADMIN — WATER 2000 MG: 1 INJECTION INTRAMUSCULAR; INTRAVENOUS; SUBCUTANEOUS at 12:35

## 2024-03-29 ASSESSMENT — LIFESTYLE VARIABLES
HOW MANY STANDARD DRINKS CONTAINING ALCOHOL DO YOU HAVE ON A TYPICAL DAY: 1 OR 2
HOW OFTEN DO YOU HAVE A DRINK CONTAINING ALCOHOL: MONTHLY OR LESS

## 2024-03-29 ASSESSMENT — PAIN - FUNCTIONAL ASSESSMENT: PAIN_FUNCTIONAL_ASSESSMENT: NONE - DENIES PAIN

## 2024-03-29 ASSESSMENT — PAIN SCALES - GENERAL: PAINLEVEL_OUTOF10: 0

## 2024-03-29 NOTE — ED PROVIDER NOTES
[facility] EMERGENCY DEP  EMERGENCY DEPARTMENT ENCOUNTER      Pt Name: Milly Morse  MRN: [id number]  Birthdate 1945  Date of evaluation: 3/29/2024  Provider: Huang Rey MD    CHIEF COMPLAINT     No chief complaint on file.        HISTORY OF PRESENT ILLNESS    This is a 78-year-old female past med history of COPD, pulmonary fibrosis.  Reportedly on 9 L.  Recent hospitalization for respiratory failure.  Patient been in the ER for ration of shortness of breath since this morning.  No reported fevers.  EMS was called patient was on her home 9 L and was noted to be hypoxic in the 70s.  Was trying to take a DuoNeb herself.  Additionally given another albuterol neb by EMS.  On arrival here on facemask with O2 sats in 70s to 80s.  Increased work of breathing with diffuse rhonchi            Review of External Medical Records:     Nursing Notes were reviewed.    REVIEW OF SYSTEMS       Review of Systems   Unable to perform ROS: Severe respiratory distress       Except as noted above the remainder of the review of systems was reviewed and negative.       PAST MEDICAL HISTORY     Past Medical History:   Diagnosis Date    Adverse effect of anesthesia     vocal cord swelling with bronchoscopy - was given lidocaine for bronchoscopy - unsure if lidocaine this caused the swelling/\"put me out the second time\" and did not use lidocaine and there no problem    COPD (chronic obstructive pulmonary disease) (HCC)     does not use oxygen, \"mild COPD\" per pt, CXR states otherwise    Ill-defined condition     uses 2 liters of oxygen    Ill-defined condition     pulmonary fibrosis    Menopause     Pulmonary fibrosis (HCC)     Thyroid disease     hypothryoid         SURGICAL HISTORY       Past Surgical History:   Procedure Laterality Date    CHOLECYSTECTOMY      COLONOSCOPY  9.2015    polyps x2    COLONOSCOPY  12/2018    polyp    COLONOSCOPY N/A 4/22/2021    COLONOSCOPY   :- performed by Alfredo Rey MD at [facility] ENDOSCOPY

## 2024-03-30 LAB
ALBUMIN SERPL-MCNC: 2.6 G/DL (ref 3.5–5)
ALBUMIN/GLOB SERPL: 0.8 (ref 1.1–2.2)
ALP SERPL-CCNC: 61 U/L (ref 45–117)
ALT SERPL-CCNC: 29 U/L (ref 12–78)
ANION GAP SERPL CALC-SCNC: 7 MMOL/L (ref 5–15)
AST SERPL-CCNC: 18 U/L (ref 15–37)
BASOPHILS # BLD: 0 K/UL (ref 0–0.1)
BASOPHILS NFR BLD: 0 % (ref 0–1)
BILIRUB DIRECT SERPL-MCNC: <0.1 MG/DL (ref 0–0.2)
BILIRUB SERPL-MCNC: 0.4 MG/DL (ref 0.2–1)
BUN SERPL-MCNC: 12 MG/DL (ref 6–20)
BUN/CREAT SERPL: 14 (ref 12–20)
CALCIUM SERPL-MCNC: 8.7 MG/DL (ref 8.5–10.1)
CHLORIDE SERPL-SCNC: 114 MMOL/L (ref 97–108)
CO2 SERPL-SCNC: 22 MMOL/L (ref 21–32)
CREAT SERPL-MCNC: 0.87 MG/DL (ref 0.55–1.02)
DIFFERENTIAL METHOD BLD: ABNORMAL
EOSINOPHIL # BLD: 0 K/UL (ref 0–0.4)
EOSINOPHIL NFR BLD: 0 % (ref 0–7)
ERYTHROCYTE [DISTWIDTH] IN BLOOD BY AUTOMATED COUNT: 15.9 % (ref 11.5–14.5)
GLOBULIN SER CALC-MCNC: 3.2 G/DL (ref 2–4)
GLUCOSE SERPL-MCNC: 176 MG/DL (ref 65–100)
HCT VFR BLD AUTO: 31.9 % (ref 35–47)
HGB BLD-MCNC: 10.2 G/DL (ref 11.5–16)
IMM GRANULOCYTES # BLD AUTO: 0.2 K/UL (ref 0–0.04)
IMM GRANULOCYTES NFR BLD AUTO: 2 % (ref 0–0.5)
LYMPHOCYTES # BLD: 0.6 K/UL (ref 0.8–3.5)
LYMPHOCYTES NFR BLD: 6 % (ref 12–49)
MCH RBC QN AUTO: 33.3 PG (ref 26–34)
MCHC RBC AUTO-ENTMCNC: 32 G/DL (ref 30–36.5)
MCV RBC AUTO: 104.2 FL (ref 80–99)
MONOCYTES # BLD: 0.5 K/UL (ref 0–1)
MONOCYTES NFR BLD: 5 % (ref 5–13)
NEUTS SEG # BLD: 8.3 K/UL (ref 1.8–8)
NEUTS SEG NFR BLD: 87 % (ref 32–75)
NRBC # BLD: 0.02 K/UL (ref 0–0.01)
NRBC BLD-RTO: 0.2 PER 100 WBC
PHOSPHATE SERPL-MCNC: 2.4 MG/DL (ref 2.6–4.7)
PLATELET # BLD AUTO: 154 K/UL (ref 150–400)
PMV BLD AUTO: 11.3 FL (ref 8.9–12.9)
POTASSIUM SERPL-SCNC: 4.1 MMOL/L (ref 3.5–5.1)
PROCALCITONIN SERPL-MCNC: 0.05 NG/ML
PROT SERPL-MCNC: 5.8 G/DL (ref 6.4–8.2)
RBC # BLD AUTO: 3.06 M/UL (ref 3.8–5.2)
RBC MORPH BLD: ABNORMAL
RBC MORPH BLD: ABNORMAL
SODIUM SERPL-SCNC: 143 MMOL/L (ref 136–145)
WBC # BLD AUTO: 9.6 K/UL (ref 3.6–11)

## 2024-03-30 PROCEDURE — 84100 ASSAY OF PHOSPHORUS: CPT

## 2024-03-30 PROCEDURE — 85025 COMPLETE CBC W/AUTO DIFF WBC: CPT

## 2024-03-30 PROCEDURE — 80076 HEPATIC FUNCTION PANEL: CPT

## 2024-03-30 PROCEDURE — 84145 PROCALCITONIN (PCT): CPT

## 2024-03-30 PROCEDURE — 6370000000 HC RX 637 (ALT 250 FOR IP): Performed by: INTERNAL MEDICINE

## 2024-03-30 PROCEDURE — 2700000000 HC OXYGEN THERAPY PER DAY

## 2024-03-30 PROCEDURE — 6360000002 HC RX W HCPCS: Performed by: INTERNAL MEDICINE

## 2024-03-30 PROCEDURE — 2580000003 HC RX 258: Performed by: INTERNAL MEDICINE

## 2024-03-30 PROCEDURE — 2000000000 HC ICU R&B

## 2024-03-30 PROCEDURE — 36415 COLL VENOUS BLD VENIPUNCTURE: CPT

## 2024-03-30 PROCEDURE — 80048 BASIC METABOLIC PNL TOTAL CA: CPT

## 2024-03-30 RX ORDER — LEVOTHYROXINE SODIUM 88 UG/1
88 TABLET ORAL DAILY
Status: DISCONTINUED | OUTPATIENT
Start: 2024-03-30 | End: 2024-04-05

## 2024-03-30 RX ORDER — ALPRAZOLAM 0.25 MG/1
0.25 TABLET ORAL 3 TIMES DAILY PRN
Status: DISCONTINUED | OUTPATIENT
Start: 2024-03-30 | End: 2024-04-06 | Stop reason: HOSPADM

## 2024-03-30 RX ORDER — ALBUTEROL SULFATE 2.5 MG/3ML
2.5 SOLUTION RESPIRATORY (INHALATION) EVERY 6 HOURS PRN
Status: DISCONTINUED | OUTPATIENT
Start: 2024-03-30 | End: 2024-04-06 | Stop reason: HOSPADM

## 2024-03-30 RX ORDER — PANTOPRAZOLE SODIUM 40 MG/1
40 TABLET, DELAYED RELEASE ORAL DAILY
Status: DISCONTINUED | OUTPATIENT
Start: 2024-03-30 | End: 2024-04-05

## 2024-03-30 RX ORDER — GABAPENTIN 300 MG/1
300 CAPSULE ORAL 3 TIMES DAILY
Status: DISCONTINUED | OUTPATIENT
Start: 2024-03-30 | End: 2024-04-05

## 2024-03-30 RX ADMIN — SODIUM CHLORIDE: 9 INJECTION, SOLUTION INTRAVENOUS at 10:01

## 2024-03-30 RX ADMIN — GABAPENTIN 300 MG: 300 CAPSULE ORAL at 20:52

## 2024-03-30 RX ADMIN — SODIUM CHLORIDE, PRESERVATIVE FREE 10 ML: 5 INJECTION INTRAVENOUS at 10:15

## 2024-03-30 RX ADMIN — METHYLPREDNISOLONE SODIUM SUCCINATE 500 MG: 500 INJECTION INTRAMUSCULAR; INTRAVENOUS at 20:53

## 2024-03-30 RX ADMIN — ENOXAPARIN SODIUM 40 MG: 100 INJECTION SUBCUTANEOUS at 10:04

## 2024-03-30 RX ADMIN — WATER 40 MG: 1 INJECTION INTRAMUSCULAR; INTRAVENOUS; SUBCUTANEOUS at 13:55

## 2024-03-30 RX ADMIN — LEVOTHYROXINE SODIUM 88 MCG: 0.09 TABLET ORAL at 12:53

## 2024-03-30 RX ADMIN — ALPRAZOLAM 0.12 MG: 0.5 TABLET ORAL at 15:13

## 2024-03-30 RX ADMIN — ALPRAZOLAM 0.12 MG: 0.5 TABLET ORAL at 19:36

## 2024-03-30 RX ADMIN — PANTOPRAZOLE SODIUM 40 MG: 40 TABLET, DELAYED RELEASE ORAL at 13:04

## 2024-03-30 RX ADMIN — SODIUM CHLORIDE, PRESERVATIVE FREE 10 ML: 5 INJECTION INTRAVENOUS at 20:52

## 2024-03-30 RX ADMIN — METHYLPREDNISOLONE SODIUM SUCCINATE 500 MG: 500 INJECTION INTRAMUSCULAR; INTRAVENOUS at 10:09

## 2024-03-30 RX ADMIN — GABAPENTIN 300 MG: 300 CAPSULE ORAL at 13:55

## 2024-03-30 ASSESSMENT — PAIN SCALES - GENERAL
PAINLEVEL_OUTOF10: 0

## 2024-03-30 NOTE — ACP (ADVANCE CARE PLANNING)
Patient brought in signed DDNR and states that she would like to remain DNR. Placed in physical chart. Changed code status to reflect patient's wishes.     Krysta Webster, NP-C    Critical Care Medicine  [facility]

## 2024-03-30 NOTE — CARE COORDINATION
Care Management Initial Assessment       RUR: 26%  Readmission? Yes -   1st IM letter given? Yes - 03/30/24  Insurance: [employer]  Contacts: Daughter-in-law Emelia Morse [phone]   Sister-in-law Shoshana Wick [phone]  Transportation: family or a friend  Disposition: Home with Home Health vs other. Patient is open to [facility] for PT, OT, and SN and will need renew orders.     Patient admitted inpatient for idiopathic pulmonary fibrosis. Patient currently in ICU.    1-29 to 2-7-24 Inpatient COPD exacerbation  2-29 to 3-6-24 Inpatient for Acute respiratory failure with hypoxia.  3-6-24 to 3-13-24 [facility] (2 weeks)  Opened to [facility] (PT, OT, SN)  3-13-24 Pulmonologist visit      Met with patient in the room. While speaking with patient, she coughed frequently and eventually her sats decreased. The nurse came in to assist patient with breathing exercises to increase her oxygen sats.     Patient confirmed her address, phone number, and emergency contacts. Patient lives alone in a 1 level home with level entry. She has oxygen with [employer]. She has a rollator, shower chair, and lift chair/ She uses [facility] on [address]. Patient stated she was at [facility] for about 2 weeks. She went home with home and now has [facility] for PT, OT, SN.      Asked patient her plan for when she is discharged. Patient stated she was not sure if plan is home or facility.        03/30/24 2725   Service Assessment   Patient Orientation Alert and Oriented   Cognition Alert   History Provided By Patient   Primary Caregiver Self   Support Systems Family Members;Children   Patient's Healthcare Decision Maker is: Legal Next of Kin   PCP Verified by CM Yes  (Dr. Babak Roque)   Last Visit to PCP Within last 3 months   Prior Functional Level Independent in ADLs/IADLs   Current Functional Level Independent in ADLs/IADLs   Can patient return to prior living

## 2024-03-30 NOTE — PROGRESS NOTES
[facility] Progress Note        Milly Morse  1945  [id number]  3/30/2024      Assessment and plan:  IPF with possible flare:  -underlying diagnosis was CPFE.    -remains hypoxic on HFNC.    -chest CT reviewed and no signs of infection.  There is also not much ground glass to suggest active inflammation.  Mostly advanced fibrosis with honeycombing at the bases and traction bronchiectasis  -there is almost no remaining normal pulmonary parenchyma  -active inflammation seen on fe CT.  I do not see this now.    -afebrile.  PCT 0.05.  dc abx  -fear progression of disease.  we may be nearing end stages   -methylpred 500 BID x 3 days with taper for salvage  -reviewed last CT in , severe IPF with inflammation at bases, honeycomb pattern and traction bronchiectasis  -OFEV on hold.  -spo2 > 87% is ok   -she prefers higher fio2 to higher flow for comfort  -not much role for nebs, albuterol prn on the chart.  She is using albuterol TID PTA without any benefit.   -echo from Feb admission reviewed and it does not show any pulmonary hypertension      Acute on chronic hypoxic respiratory failure:  -cont HFNC  -baseline HOT 8lpm NC  -wean to keep spo2 > 87%      Sinus tach: resolved      ELIZA: resolved     End of life care:  I discussed my concerns this am at length.  Milly is not unaware of this process. She has cared for two family members who  from IPF.  I added prn xanax to help with any steroid induced anxiety.  Will consult hospice if no response to steroids.  Plan to consult pulmonary Monday as she is a pt of Dr Chicas.      HPI:  Remains on HFNC  able to decrease to 30lpm and 50%.        ICU DAILY CHECKLIST     Code Status:DNR  DVT Prophylaxis:lovenox  T/L/D: PIVs  SUP: ppi daily  Diet: regular   Activity Level: as tolerated   ABCDEF Bundle/Checklist Completed:Yes  Disposition: cont ICU care  Multidisciplinary Rounds Completed: yes  Goals of Care Discussion/Palliative: yes  Patient/Family

## 2024-03-30 NOTE — CARE COORDINATION
03/30/24 1618   Readmission Assessment   Number of Days since last admission? 8-30 days   Previous Disposition SNF   Who is being Interviewed Patient   What was the patient's/caregiver's perception as to why they think they needed to return back to the hospital? Other (Comment)  (worsening condition)   Did you visit your Primary Care Physician after you left the hospital, before you returned this time? Yes   Why weren't you able to visit your PCP? Other (Comment)  (NA)   Did you see a specialist, such as Cardiac, Pulmonary, Orthopedic Physician, etc. after you left the hospital? Yes  (Pulmonary on 3-13-24)   Who advised the patient to return to the hospital? Self-referral   Does the patient report anything that got in the way of taking their medications? No   In our efforts to provide the best possible care to you and others like you, can you think of anything that we could have done to help you after you left the hospital the first time, so that you might not have needed to return so soon? Other (Comment)  (worsening condition)     LEONOR GUARDADO

## 2024-03-31 LAB
ANION GAP SERPL CALC-SCNC: 5 MMOL/L (ref 5–15)
BASOPHILS # BLD: 0 K/UL (ref 0–0.1)
BASOPHILS NFR BLD: 0 % (ref 0–1)
BUN SERPL-MCNC: 16 MG/DL (ref 6–20)
BUN/CREAT SERPL: 17 (ref 12–20)
CALCIUM SERPL-MCNC: 8 MG/DL (ref 8.5–10.1)
CHLORIDE SERPL-SCNC: 117 MMOL/L (ref 97–108)
CO2 SERPL-SCNC: 22 MMOL/L (ref 21–32)
CREAT SERPL-MCNC: 0.96 MG/DL (ref 0.55–1.02)
DIFFERENTIAL METHOD BLD: ABNORMAL
EOSINOPHIL # BLD: 0 K/UL (ref 0–0.4)
EOSINOPHIL NFR BLD: 0 % (ref 0–7)
ERYTHROCYTE [DISTWIDTH] IN BLOOD BY AUTOMATED COUNT: 16 % (ref 11.5–14.5)
GLUCOSE SERPL-MCNC: 178 MG/DL (ref 65–100)
HCT VFR BLD AUTO: 33.6 % (ref 35–47)
HGB BLD-MCNC: 10.5 G/DL (ref 11.5–16)
IMM GRANULOCYTES # BLD AUTO: 0.1 K/UL (ref 0–0.04)
IMM GRANULOCYTES NFR BLD AUTO: 1 % (ref 0–0.5)
LYMPHOCYTES # BLD: 0.6 K/UL (ref 0.8–3.5)
LYMPHOCYTES NFR BLD: 5 % (ref 12–49)
MAGNESIUM SERPL-MCNC: 2.4 MG/DL (ref 1.6–2.4)
MCH RBC QN AUTO: 32.6 PG (ref 26–34)
MCHC RBC AUTO-ENTMCNC: 31.3 G/DL (ref 30–36.5)
MCV RBC AUTO: 104.3 FL (ref 80–99)
MONOCYTES # BLD: 0.4 K/UL (ref 0–1)
MONOCYTES NFR BLD: 3 % (ref 5–13)
NEUTS SEG # BLD: 11 K/UL (ref 1.8–8)
NEUTS SEG NFR BLD: 91 % (ref 32–75)
NRBC # BLD: 0.03 K/UL (ref 0–0.01)
NRBC BLD-RTO: 0.2 PER 100 WBC
PHOSPHATE SERPL-MCNC: 3.6 MG/DL (ref 2.6–4.7)
PLATELET # BLD AUTO: 197 K/UL (ref 150–400)
PMV BLD AUTO: 12.4 FL (ref 8.9–12.9)
POTASSIUM SERPL-SCNC: 5 MMOL/L (ref 3.5–5.1)
RBC # BLD AUTO: 3.22 M/UL (ref 3.8–5.2)
RBC MORPH BLD: ABNORMAL
RBC MORPH BLD: ABNORMAL
SODIUM SERPL-SCNC: 144 MMOL/L (ref 136–145)
WBC # BLD AUTO: 12.1 K/UL (ref 3.6–11)

## 2024-03-31 PROCEDURE — 6360000002 HC RX W HCPCS: Performed by: INTERNAL MEDICINE

## 2024-03-31 PROCEDURE — 2700000000 HC OXYGEN THERAPY PER DAY

## 2024-03-31 PROCEDURE — 6370000000 HC RX 637 (ALT 250 FOR IP): Performed by: INTERNAL MEDICINE

## 2024-03-31 PROCEDURE — 83735 ASSAY OF MAGNESIUM: CPT

## 2024-03-31 PROCEDURE — 2580000003 HC RX 258: Performed by: INTERNAL MEDICINE

## 2024-03-31 PROCEDURE — 85025 COMPLETE CBC W/AUTO DIFF WBC: CPT

## 2024-03-31 PROCEDURE — 80048 BASIC METABOLIC PNL TOTAL CA: CPT

## 2024-03-31 PROCEDURE — 84100 ASSAY OF PHOSPHORUS: CPT

## 2024-03-31 PROCEDURE — 2000000000 HC ICU R&B

## 2024-03-31 RX ORDER — FUROSEMIDE 10 MG/ML
20 INJECTION INTRAMUSCULAR; INTRAVENOUS ONCE
Status: COMPLETED | OUTPATIENT
Start: 2024-03-31 | End: 2024-03-31

## 2024-03-31 RX ORDER — BENZONATATE 100 MG/1
200 CAPSULE ORAL 3 TIMES DAILY
Status: DISCONTINUED | OUTPATIENT
Start: 2024-03-31 | End: 2024-04-05

## 2024-03-31 RX ADMIN — GABAPENTIN 300 MG: 300 CAPSULE ORAL at 15:08

## 2024-03-31 RX ADMIN — ENOXAPARIN SODIUM 40 MG: 100 INJECTION SUBCUTANEOUS at 09:44

## 2024-03-31 RX ADMIN — BENZONATATE 200 MG: 100 CAPSULE ORAL at 20:31

## 2024-03-31 RX ADMIN — BENZONATATE 200 MG: 100 CAPSULE ORAL at 15:08

## 2024-03-31 RX ADMIN — METHYLPREDNISOLONE SODIUM SUCCINATE 500 MG: 500 INJECTION INTRAMUSCULAR; INTRAVENOUS at 09:49

## 2024-03-31 RX ADMIN — FUROSEMIDE 20 MG: 10 INJECTION, SOLUTION INTRAMUSCULAR; INTRAVENOUS at 07:16

## 2024-03-31 RX ADMIN — SODIUM CHLORIDE: 9 INJECTION, SOLUTION INTRAVENOUS at 09:47

## 2024-03-31 RX ADMIN — METHYLPREDNISOLONE SODIUM SUCCINATE 500 MG: 500 INJECTION INTRAMUSCULAR; INTRAVENOUS at 20:37

## 2024-03-31 RX ADMIN — GABAPENTIN 300 MG: 300 CAPSULE ORAL at 09:44

## 2024-03-31 RX ADMIN — ALPRAZOLAM 0.25 MG: 0.5 TABLET ORAL at 17:53

## 2024-03-31 RX ADMIN — SODIUM CHLORIDE, PRESERVATIVE FREE 10 ML: 5 INJECTION INTRAVENOUS at 09:45

## 2024-03-31 RX ADMIN — LEVOTHYROXINE SODIUM 88 MCG: 0.09 TABLET ORAL at 06:55

## 2024-03-31 RX ADMIN — PANTOPRAZOLE SODIUM 40 MG: 40 TABLET, DELAYED RELEASE ORAL at 09:44

## 2024-03-31 RX ADMIN — SODIUM CHLORIDE, PRESERVATIVE FREE 10 ML: 5 INJECTION INTRAVENOUS at 20:31

## 2024-03-31 RX ADMIN — BENZONATATE 200 MG: 100 CAPSULE ORAL at 11:14

## 2024-03-31 RX ADMIN — GABAPENTIN 300 MG: 300 CAPSULE ORAL at 20:31

## 2024-03-31 RX ADMIN — ALPRAZOLAM 0.25 MG: 0.5 TABLET ORAL at 10:01

## 2024-03-31 NOTE — PROGRESS NOTES
[facility] Progress Note        Milly Morse  1945  [id number]  3/31/2024      Assessment and plan:  IPF with possible flare:  -underlying diagnosis was CPFE.    -remains hypoxic on HFNC.    -chest CT reviewed and no signs of infection.  There is also not much ground glass to suggest active inflammation.  Mostly advanced fibrosis with honeycombing at the bases and traction bronchiectasis  -there is almost no remaining normal pulmonary parenchyma  -active inflammation seen on feb CT.  I do not see this now.    -afebrile.  PCT 0.05.  dc abx  -fear progression of disease.  we may be nearing end stages   -methylpred 500 BID x 3 days with taper for salvage  -reviewed last CT in 2/24, severe IPF with inflammation at bases, honeycomb pattern and traction bronchiectasis  -OFEV on hold.  -spo2 > 87% is ok   -she prefers higher fio2 to higher flow for comfort  -not much role for nebs, albuterol prn on the chart.  She was using albuterol TID PTA without any benefit.   -echo from Feb admission reviewed and it does not show any pulmonary hypertension   -add tessalon 200 TID for help with terrible cough.       Acute on chronic hypoxic respiratory failure:  -cont HFNC  -baseline HOT 8lpm NC  -wean to keep spo2 > 87%      Possible hospice consult if no improvement  Will let PAR know she is here to follow     HPI:  Remains on HFNC.  Not yet able to make progess with weaning.         ICU DAILY CHECKLIST     Code Status:full   DVT Prophylaxis: lovenox   T/L/D: PIVs  SUP: ppi   Diet: regular  Activity Level: mobilize daily   ABCDEF Bundle/Checklist Completed:Yes  Disposition: cont ICU care  Multidisciplinary Rounds Completed: yes  Goals of Care Discussion/Palliative: yes  Patient/Family Updated: yes      OBJECTIVE  Vitals:    03/31/24 0700 03/31/24 0710 03/31/24 0801 03/31/24 1233   BP: 126/86      Pulse: (!) 103 72 80    Resp: 27 21 20    Temp:       TempSrc:       SpO2: (!) 83% 93% (!) 88% 91%   Weight:

## 2024-03-31 NOTE — PROGRESS NOTES
Some improvement.     FIO2 down to 50% and Flow to 30 lpm      Does well with xanax + tessalon prior to mobilizing.

## 2024-04-01 LAB
ANION GAP SERPL CALC-SCNC: 5 MMOL/L (ref 5–15)
BASOPHILS # BLD: 0 K/UL (ref 0–0.1)
BASOPHILS NFR BLD: 0 % (ref 0–1)
BUN SERPL-MCNC: 18 MG/DL (ref 6–20)
BUN/CREAT SERPL: 19 (ref 12–20)
CALCIUM SERPL-MCNC: 9.3 MG/DL (ref 8.5–10.1)
CHLORIDE SERPL-SCNC: 111 MMOL/L (ref 97–108)
CO2 SERPL-SCNC: 25 MMOL/L (ref 21–32)
CREAT SERPL-MCNC: 0.94 MG/DL (ref 0.55–1.02)
DIFFERENTIAL METHOD BLD: ABNORMAL
EKG ATRIAL RATE: 139 BPM
EKG DIAGNOSIS: NORMAL
EKG P AXIS: 59 DEGREES
EKG P-R INTERVAL: 128 MS
EKG Q-T INTERVAL: 296 MS
EKG QRS DURATION: 68 MS
EKG QTC CALCULATION (BAZETT): 450 MS
EKG R AXIS: 65 DEGREES
EKG T AXIS: 54 DEGREES
EKG VENTRICULAR RATE: 139 BPM
EOSINOPHIL # BLD: 0 K/UL (ref 0–0.4)
EOSINOPHIL NFR BLD: 0 % (ref 0–7)
ERYTHROCYTE [DISTWIDTH] IN BLOOD BY AUTOMATED COUNT: 15.4 % (ref 11.5–14.5)
GLUCOSE BLD STRIP.AUTO-MCNC: 153 MG/DL (ref 65–117)
GLUCOSE BLD STRIP.AUTO-MCNC: 206 MG/DL (ref 65–117)
GLUCOSE BLD STRIP.AUTO-MCNC: 233 MG/DL (ref 65–117)
GLUCOSE SERPL-MCNC: 222 MG/DL (ref 65–100)
HCT VFR BLD AUTO: 30.6 % (ref 35–47)
HGB BLD-MCNC: 10.2 G/DL (ref 11.5–16)
IMM GRANULOCYTES # BLD AUTO: 0.1 K/UL (ref 0–0.04)
IMM GRANULOCYTES NFR BLD AUTO: 1 % (ref 0–0.5)
LYMPHOCYTES # BLD: 0.5 K/UL (ref 0.8–3.5)
LYMPHOCYTES NFR BLD: 4 % (ref 12–49)
MAGNESIUM SERPL-MCNC: 2.5 MG/DL (ref 1.6–2.4)
MCH RBC QN AUTO: 34.1 PG (ref 26–34)
MCHC RBC AUTO-ENTMCNC: 33.3 G/DL (ref 30–36.5)
MCV RBC AUTO: 102.3 FL (ref 80–99)
MONOCYTES # BLD: 0.4 K/UL (ref 0–1)
MONOCYTES NFR BLD: 3 % (ref 5–13)
NEUTS SEG # BLD: 11 K/UL (ref 1.8–8)
NEUTS SEG NFR BLD: 92 % (ref 32–75)
NRBC # BLD: 0.04 K/UL (ref 0–0.01)
NRBC BLD-RTO: 0.3 PER 100 WBC
PHOSPHATE SERPL-MCNC: 2.6 MG/DL (ref 2.6–4.7)
PLATELET # BLD AUTO: 167 K/UL (ref 150–400)
PMV BLD AUTO: 11.4 FL (ref 8.9–12.9)
POTASSIUM SERPL-SCNC: 3.5 MMOL/L (ref 3.5–5.1)
RBC # BLD AUTO: 2.99 M/UL (ref 3.8–5.2)
RBC MORPH BLD: ABNORMAL
RBC MORPH BLD: ABNORMAL
SERVICE CMNT-IMP: ABNORMAL
SODIUM SERPL-SCNC: 141 MMOL/L (ref 136–145)
WBC # BLD AUTO: 12 K/UL (ref 3.6–11)

## 2024-04-01 PROCEDURE — 36415 COLL VENOUS BLD VENIPUNCTURE: CPT

## 2024-04-01 PROCEDURE — 82962 GLUCOSE BLOOD TEST: CPT

## 2024-04-01 PROCEDURE — 6370000000 HC RX 637 (ALT 250 FOR IP): Performed by: NURSE PRACTITIONER

## 2024-04-01 PROCEDURE — 6360000002 HC RX W HCPCS: Performed by: INTERNAL MEDICINE

## 2024-04-01 PROCEDURE — 2000000000 HC ICU R&B

## 2024-04-01 PROCEDURE — 6370000000 HC RX 637 (ALT 250 FOR IP): Performed by: SURGERY

## 2024-04-01 PROCEDURE — 2580000003 HC RX 258: Performed by: INTERNAL MEDICINE

## 2024-04-01 PROCEDURE — 6370000000 HC RX 637 (ALT 250 FOR IP): Performed by: INTERNAL MEDICINE

## 2024-04-01 PROCEDURE — 93010 ELECTROCARDIOGRAM REPORT: CPT | Performed by: SPECIALIST

## 2024-04-01 PROCEDURE — 83735 ASSAY OF MAGNESIUM: CPT

## 2024-04-01 PROCEDURE — 94761 N-INVAS EAR/PLS OXIMETRY MLT: CPT

## 2024-04-01 PROCEDURE — 94640 AIRWAY INHALATION TREATMENT: CPT

## 2024-04-01 PROCEDURE — 84100 ASSAY OF PHOSPHORUS: CPT

## 2024-04-01 PROCEDURE — 2700000000 HC OXYGEN THERAPY PER DAY

## 2024-04-01 PROCEDURE — 85025 COMPLETE CBC W/AUTO DIFF WBC: CPT

## 2024-04-01 PROCEDURE — 80048 BASIC METABOLIC PNL TOTAL CA: CPT

## 2024-04-01 RX ORDER — INSULIN LISPRO 100 [IU]/ML
0-4 INJECTION, SOLUTION INTRAVENOUS; SUBCUTANEOUS NIGHTLY
Status: DISCONTINUED | OUTPATIENT
Start: 2024-04-01 | End: 2024-04-05

## 2024-04-01 RX ORDER — POTASSIUM CHLORIDE 750 MG/1
40 TABLET, FILM COATED, EXTENDED RELEASE ORAL PRN
Status: DISCONTINUED | OUTPATIENT
Start: 2024-04-01 | End: 2024-04-05

## 2024-04-01 RX ORDER — INSULIN LISPRO 100 [IU]/ML
0-8 INJECTION, SOLUTION INTRAVENOUS; SUBCUTANEOUS
Status: DISCONTINUED | OUTPATIENT
Start: 2024-04-01 | End: 2024-04-05

## 2024-04-01 RX ORDER — IPRATROPIUM BROMIDE AND ALBUTEROL SULFATE 2.5; .5 MG/3ML; MG/3ML
1 SOLUTION RESPIRATORY (INHALATION)
Status: DISCONTINUED | OUTPATIENT
Start: 2024-04-01 | End: 2024-04-05

## 2024-04-01 RX ORDER — DEXTROSE MONOHYDRATE 100 MG/ML
INJECTION, SOLUTION INTRAVENOUS CONTINUOUS PRN
Status: DISCONTINUED | OUTPATIENT
Start: 2024-04-01 | End: 2024-04-06 | Stop reason: HOSPADM

## 2024-04-01 RX ORDER — POTASSIUM CHLORIDE 7.45 MG/ML
10 INJECTION INTRAVENOUS PRN
Status: DISCONTINUED | OUTPATIENT
Start: 2024-04-01 | End: 2024-04-05

## 2024-04-01 RX ORDER — MONTELUKAST SODIUM 10 MG/1
10 TABLET ORAL NIGHTLY
Status: DISCONTINUED | OUTPATIENT
Start: 2024-04-01 | End: 2024-04-05

## 2024-04-01 RX ADMIN — INSULIN LISPRO 2 UNITS: 100 INJECTION, SOLUTION INTRAVENOUS; SUBCUTANEOUS at 12:24

## 2024-04-01 RX ADMIN — BENZONATATE 200 MG: 100 CAPSULE ORAL at 14:12

## 2024-04-01 RX ADMIN — GABAPENTIN 300 MG: 300 CAPSULE ORAL at 09:29

## 2024-04-01 RX ADMIN — PANTOPRAZOLE SODIUM 40 MG: 40 TABLET, DELAYED RELEASE ORAL at 09:29

## 2024-04-01 RX ADMIN — SODIUM CHLORIDE, PRESERVATIVE FREE 10 ML: 5 INJECTION INTRAVENOUS at 09:29

## 2024-04-01 RX ADMIN — GABAPENTIN 300 MG: 300 CAPSULE ORAL at 21:14

## 2024-04-01 RX ADMIN — IPRATROPIUM BROMIDE AND ALBUTEROL SULFATE 1 DOSE: .5; 3 SOLUTION RESPIRATORY (INHALATION) at 17:13

## 2024-04-01 RX ADMIN — MONTELUKAST 10 MG: 10 TABLET, FILM COATED ORAL at 21:14

## 2024-04-01 RX ADMIN — ALPRAZOLAM 0.25 MG: 0.5 TABLET ORAL at 09:28

## 2024-04-01 RX ADMIN — BENZONATATE 200 MG: 100 CAPSULE ORAL at 21:14

## 2024-04-01 RX ADMIN — IPRATROPIUM BROMIDE AND ALBUTEROL SULFATE 1 DOSE: .5; 3 SOLUTION RESPIRATORY (INHALATION) at 20:32

## 2024-04-01 RX ADMIN — ENOXAPARIN SODIUM 40 MG: 100 INJECTION SUBCUTANEOUS at 09:28

## 2024-04-01 RX ADMIN — POTASSIUM CHLORIDE 40 MEQ: 750 TABLET, EXTENDED RELEASE ORAL at 06:14

## 2024-04-01 RX ADMIN — ALPRAZOLAM 0.25 MG: 0.5 TABLET ORAL at 18:06

## 2024-04-01 RX ADMIN — BENZONATATE 200 MG: 100 CAPSULE ORAL at 09:29

## 2024-04-01 RX ADMIN — SODIUM CHLORIDE, PRESERVATIVE FREE 10 ML: 5 INJECTION INTRAVENOUS at 21:18

## 2024-04-01 RX ADMIN — GABAPENTIN 300 MG: 300 CAPSULE ORAL at 14:12

## 2024-04-01 RX ADMIN — METHYLPREDNISOLONE SODIUM SUCCINATE 500 MG: 500 INJECTION INTRAMUSCULAR; INTRAVENOUS at 21:17

## 2024-04-01 RX ADMIN — LEVOTHYROXINE SODIUM 88 MCG: 0.09 TABLET ORAL at 06:14

## 2024-04-01 RX ADMIN — METHYLPREDNISOLONE SODIUM SUCCINATE 500 MG: 500 INJECTION INTRAMUSCULAR; INTRAVENOUS at 10:17

## 2024-04-01 ASSESSMENT — PAIN SCALES - GENERAL
PAINLEVEL_OUTOF10: 0

## 2024-04-01 NOTE — PROGRESS NOTES
[facility] Progress Note        Milly Morse  1945  [id number]  4/1/2024    ICU Day#4    Assessment and plan:  Idiopathic pulmonary fibrosis end-stage exacerbation  -remains hypoxic on HFNC.      -FiO2 94%, 30 L of flow  -Continue to wean high flow as tolerated  -Continue pulse dose steroids   -Steroid taper already placed and orders  -Maintain sats greater than 88%  -Continue out of bed to physical therapy  -Pulmonary consultation recommendations   -Continue high flow nasal oxygen and wean as tolerated   -Continue pulse dose steroids for 3 days   -plan for prednisone thereafter with Bactrim prophylaxis    -Restarted Ofev 100 mg daily    -Resume azithromycin TIW for antiinflammatory effect      -Continue GI prophylaxis     -Continue DVT prophylaxis    -Palliative care and hospice   Patient only meets criteria for inpatient hospice based on oxygen requirements     Family will be arriving tomorrow for ongoing discussion regarding hospice care.     Acute on chronic hypoxic respiratory failure:  -Baseline oxygen requirements 8 L  -continue high flow nasal cannula  -wean to keep spo2 > 88%     Patient colin on high flow nasal cannula without successful weaning.    ICU DAILY CHECKLIST     Code Status:full   DVT Prophylaxis: lovenox   T/L/D: PIVs  SUP: ppi   Diet: regular  Activity Level: mobilize daily   ABCDEF Bundle/Checklist Completed:Yes  Disposition: cont ICU care  Multidisciplinary Rounds Completed: yes  Goals of Care Discussion/Palliative: yes  Patient/Family Updated: yes      OBJECTIVE  Vitals:    04/01/24 0500 04/01/24 0600 04/01/24 0700 04/01/24 0710   BP: 129/60 (!) 154/69 (!) 126/49    Pulse: (!) 45 (!) 44 80 50   Resp: 17 14 28 21   Temp:       TempSrc:       SpO2: 91% 96% (!) 82% 94%   Weight:       Height:         EXAM:   GEN: awake alert and oriented   HEENT  -Head: NC/AT;  -Eyes: PERRL, EOMI. No discharge or redness;  -Ears: External ears are normal. Normal TMs.  -Nose: Normal

## 2024-04-01 NOTE — CARE COORDINATION
Transition of Care Plan:    RUR: 26% High   Prior Level of Functioning: Independent  Disposition: TBD   Follow up appointments: Pulmonology  DME needed: TBD  Transportation at discharge: Family vs BLS  IM/IMM Medicare letter given: 3/30/24  Is patient a [place] and connected with VA? No  Caregiver Contact: Daughter-in-law Emelia Morse [phone]              Sister-in-law Shoshana Wick [phone]  Discharge Caregiver contacted prior to discharge? Yes  Care Conference needed? No  Barriers to discharge:    Medical stability  High Flow oxygen  Code status now DNR  Considering Hospice.   Patient open to [facility]  Referral sent through [facility]. Will need orders prior to discharge pending decision regarding Hospice.  Cristina Allen RN,Care Management

## 2024-04-01 NOTE — PROGRESS NOTES
ICU multidisciplinary rounds lead by Dr. Morillo (Intensivist): The following were reviewed and discussed: current labs,  recent imaging, lines/drains, review of body systems, nutrition, cultures, mobility, length of ICU stay. The plan of care for the day is as follows: pulmonology consult today and sliding scale insulin added.

## 2024-04-01 NOTE — CONSULTS
PULMONARY/CRITICAL CARE/SLEEP MEDICINE    Initial Physician Consultation Note    Name: Milly Morse   : 1945   MRN: [id number]   Date: 2024      Subjective:   Consult Note: 2024     This patient has been seen and evaluated at the request of Dr. Ash for progressive CPFE . Medical records and data reviewed.  Patient is a 78 y.o. female who presented to the hospital with complaints of severe cough and associated hypoxia with inability to recover. Patient reports that she did not feel acutely ill.  She has had these paroxysms of cough and she reports she had 1 of those spells from which she could not recover precipitating acute shortness of breath and hypoxia despite using home oxygen at 9 to 10 L flow rate. Patient has known history of severe combined pulmonary fibrosis and emphysema and has had recurrent hospitalizations since the beginning of the year.  Therapeutic dose of Ofev has been limited because of side effects.  She currently takes 100 mg when she can tolerate it.  She was admitted to the hospital, placed on high flow oxygen and started on pulse dose steroid.  Echocardiogram that was done on her admission in 2024 had not shown presence of pulmonary hypertension.  She is currently out of the window for lung transplantation.  Severe baseline hypoxemia with risk of mortality within 6 months will preclude her from clinical trials for interstitial lung disease.  Hospice has been discussed with the patient by Dr. Ash and she is agreeable. Blood cultures on admission were negative.  Cough is nonproductive and dry. She has leukocytosis while on high-dose steroids, renal function is normal. CT of the chest is compatible with severe CPFE, no acute consolidation.       Assessment:   Acute on chronic hypoxic respiratory failure, on 8 to 10 L of oxygen at home   Progressive CPFT, end-stage disease  Leukocytosis, on steroids  DNR status  Other medical problems per  chart    Recommendations:   On high flow nasal oxygen, wean down as tolerated  On pulse dose steroids, completing 3 days, plan for prednisone thereafter with Bactrim prophylaxis  Restarted Ofev 100 mg daily, she wants to restart this although therapeutic efficacy would be limited at this stage of disease  Resume azithromycin TIW for antiinflammatory effect (Qtc 0.45)  On GI prophylaxis  On DVT prophylaxis  Placed on nebulized bronchodilators to see if it helps cough  Antitussives have been adjusted by ICU team  Agree with palliative approach and hospice - discussed with patient, friend and family at bedside.   Dr. Chicas's last office note copied below      ICD-10 Problem list:     Patient Active Problem List    Diagnosis Date Noted    IPF (idiopathic pulmonary fibrosis) ([facility]) 03/29/2024    Hypoxia 02/28/2024    Palliative care by specialist 02/28/2024    Acute on chronic respiratory failure with hypoxia ([facility]) 02/28/2024    Shortness of breath 02/28/2024    Acute respiratory failure with hypoxia ([facility]) 02/19/2024    COPD exacerbation ([facility]) 01/29/2024    COPD (chronic obstructive pulmonary disease) ([facility]) 01/29/2024    SOB (shortness of breath) 09/22/2021    Hx of colonic polyp 12/03/2018    Diverticula of colon 12/03/2018    Colon polyp 12/03/2018    Blood in stool, kelsey 08/10/2015       Current medications:     [unfilled]    Past Medical History:      has a past medical history of Adverse effect of anesthesia, COPD (chronic obstructive pulmonary disease) ([facility]), Ill-defined condition, Ill-defined condition, Menopause, Pulmonary fibrosis ([facility]), and Thyroid disease.    Past Surgical History:      has a past surgical history that includes Colonoscopy (9.2015); Colonoscopy (12/2018); other surgical history; Colonoscopy (N/A, 4/22/2021); Cholecystectomy; Tonsillectomy; Tubal ligation; and Colonoscopy (N/A, 12/3/2018).    Home Medications:     Prior to Admission medications    Medication Sig Start Date End Date Taking?

## 2024-04-02 LAB
ANION GAP SERPL CALC-SCNC: 7 MMOL/L (ref 5–15)
BASOPHILS # BLD: 0 K/UL (ref 0–0.1)
BASOPHILS NFR BLD: 0 % (ref 0–1)
BUN SERPL-MCNC: 18 MG/DL (ref 6–20)
BUN/CREAT SERPL: 19 (ref 12–20)
CALCIUM SERPL-MCNC: 8.6 MG/DL (ref 8.5–10.1)
CHLORIDE SERPL-SCNC: 112 MMOL/L (ref 97–108)
CO2 SERPL-SCNC: 26 MMOL/L (ref 21–32)
CREAT SERPL-MCNC: 0.93 MG/DL (ref 0.55–1.02)
DIFFERENTIAL METHOD BLD: ABNORMAL
EOSINOPHIL # BLD: 0 K/UL (ref 0–0.4)
EOSINOPHIL NFR BLD: 0 % (ref 0–7)
ERYTHROCYTE [DISTWIDTH] IN BLOOD BY AUTOMATED COUNT: 15.6 % (ref 11.5–14.5)
GLUCOSE BLD STRIP.AUTO-MCNC: 161 MG/DL (ref 65–117)
GLUCOSE BLD STRIP.AUTO-MCNC: 164 MG/DL (ref 65–117)
GLUCOSE BLD STRIP.AUTO-MCNC: 192 MG/DL (ref 65–117)
GLUCOSE BLD STRIP.AUTO-MCNC: 281 MG/DL (ref 65–117)
GLUCOSE SERPL-MCNC: 219 MG/DL (ref 65–100)
HCT VFR BLD AUTO: 30.9 % (ref 35–47)
HGB BLD-MCNC: 10.3 G/DL (ref 11.5–16)
IMM GRANULOCYTES # BLD AUTO: 0.2 K/UL (ref 0–0.04)
IMM GRANULOCYTES NFR BLD AUTO: 2 % (ref 0–0.5)
LYMPHOCYTES # BLD: 0.4 K/UL (ref 0.8–3.5)
LYMPHOCYTES NFR BLD: 4 % (ref 12–49)
MAGNESIUM SERPL-MCNC: 2.4 MG/DL (ref 1.6–2.4)
MCH RBC QN AUTO: 33.9 PG (ref 26–34)
MCHC RBC AUTO-ENTMCNC: 33.3 G/DL (ref 30–36.5)
MCV RBC AUTO: 101.6 FL (ref 80–99)
MONOCYTES # BLD: 0.4 K/UL (ref 0–1)
MONOCYTES NFR BLD: 4 % (ref 5–13)
NEUTS SEG # BLD: 9.2 K/UL (ref 1.8–8)
NEUTS SEG NFR BLD: 90 % (ref 32–75)
NRBC # BLD: 0.05 K/UL (ref 0–0.01)
NRBC BLD-RTO: 0.5 PER 100 WBC
PHOSPHATE SERPL-MCNC: 2.7 MG/DL (ref 2.6–4.7)
PLATELET # BLD AUTO: 168 K/UL (ref 150–400)
PMV BLD AUTO: 11.8 FL (ref 8.9–12.9)
POTASSIUM SERPL-SCNC: 4.2 MMOL/L (ref 3.5–5.1)
RBC # BLD AUTO: 3.04 M/UL (ref 3.8–5.2)
RBC MORPH BLD: ABNORMAL
RBC MORPH BLD: ABNORMAL
SERVICE CMNT-IMP: ABNORMAL
SODIUM SERPL-SCNC: 145 MMOL/L (ref 136–145)
WBC # BLD AUTO: 10.2 K/UL (ref 3.6–11)

## 2024-04-02 PROCEDURE — 2580000003 HC RX 258: Performed by: SURGERY

## 2024-04-02 PROCEDURE — 6370000000 HC RX 637 (ALT 250 FOR IP): Performed by: INTERNAL MEDICINE

## 2024-04-02 PROCEDURE — 94640 AIRWAY INHALATION TREATMENT: CPT

## 2024-04-02 PROCEDURE — 83735 ASSAY OF MAGNESIUM: CPT

## 2024-04-02 PROCEDURE — 80048 BASIC METABOLIC PNL TOTAL CA: CPT

## 2024-04-02 PROCEDURE — 82962 GLUCOSE BLOOD TEST: CPT

## 2024-04-02 PROCEDURE — 84100 ASSAY OF PHOSPHORUS: CPT

## 2024-04-02 PROCEDURE — 6370000000 HC RX 637 (ALT 250 FOR IP): Performed by: SURGERY

## 2024-04-02 PROCEDURE — 2580000003 HC RX 258: Performed by: INTERNAL MEDICINE

## 2024-04-02 PROCEDURE — 6360000002 HC RX W HCPCS: Performed by: SURGERY

## 2024-04-02 PROCEDURE — 6360000002 HC RX W HCPCS: Performed by: INTERNAL MEDICINE

## 2024-04-02 PROCEDURE — 36415 COLL VENOUS BLD VENIPUNCTURE: CPT

## 2024-04-02 PROCEDURE — 85025 COMPLETE CBC W/AUTO DIFF WBC: CPT

## 2024-04-02 PROCEDURE — 2000000000 HC ICU R&B

## 2024-04-02 RX ORDER — PREDNISONE 20 MG/1
40 TABLET ORAL 2 TIMES DAILY
Status: DISCONTINUED | OUTPATIENT
Start: 2024-04-06 | End: 2024-04-03

## 2024-04-02 RX ORDER — AZITHROMYCIN 250 MG/1
500 TABLET, FILM COATED ORAL
Status: DISCONTINUED | OUTPATIENT
Start: 2024-04-02 | End: 2024-04-05

## 2024-04-02 RX ORDER — PREDNISONE 20 MG/1
40 TABLET ORAL DAILY
Status: DISCONTINUED | OUTPATIENT
Start: 2024-04-08 | End: 2024-04-03

## 2024-04-02 RX ADMIN — ALPRAZOLAM 0.25 MG: 0.5 TABLET ORAL at 07:09

## 2024-04-02 RX ADMIN — AZITHROMYCIN DIHYDRATE 500 MG: 250 TABLET, FILM COATED ORAL at 11:44

## 2024-04-02 RX ADMIN — IPRATROPIUM BROMIDE AND ALBUTEROL SULFATE 1 DOSE: .5; 3 SOLUTION RESPIRATORY (INHALATION) at 15:42

## 2024-04-02 RX ADMIN — MONTELUKAST 10 MG: 10 TABLET, FILM COATED ORAL at 22:12

## 2024-04-02 RX ADMIN — IPRATROPIUM BROMIDE AND ALBUTEROL SULFATE 1 DOSE: .5; 3 SOLUTION RESPIRATORY (INHALATION) at 21:13

## 2024-04-02 RX ADMIN — BENZONATATE 200 MG: 100 CAPSULE ORAL at 22:12

## 2024-04-02 RX ADMIN — IPRATROPIUM BROMIDE AND ALBUTEROL SULFATE 1 DOSE: .5; 3 SOLUTION RESPIRATORY (INHALATION) at 11:44

## 2024-04-02 RX ADMIN — LEVOTHYROXINE SODIUM 88 MCG: 0.09 TABLET ORAL at 06:17

## 2024-04-02 RX ADMIN — GABAPENTIN 300 MG: 300 CAPSULE ORAL at 22:12

## 2024-04-02 RX ADMIN — BENZONATATE 200 MG: 100 CAPSULE ORAL at 14:09

## 2024-04-02 RX ADMIN — WATER 125 MG: 1 INJECTION INTRAMUSCULAR; INTRAVENOUS; SUBCUTANEOUS at 22:15

## 2024-04-02 RX ADMIN — GABAPENTIN 300 MG: 300 CAPSULE ORAL at 08:48

## 2024-04-02 RX ADMIN — SODIUM CHLORIDE, PRESERVATIVE FREE 10 ML: 5 INJECTION INTRAVENOUS at 22:12

## 2024-04-02 RX ADMIN — BENZONATATE 200 MG: 100 CAPSULE ORAL at 08:48

## 2024-04-02 RX ADMIN — IPRATROPIUM BROMIDE AND ALBUTEROL SULFATE 1 DOSE: .5; 3 SOLUTION RESPIRATORY (INHALATION) at 07:56

## 2024-04-02 RX ADMIN — ENOXAPARIN SODIUM 40 MG: 100 INJECTION SUBCUTANEOUS at 08:48

## 2024-04-02 RX ADMIN — PANTOPRAZOLE SODIUM 40 MG: 40 TABLET, DELAYED RELEASE ORAL at 08:48

## 2024-04-02 RX ADMIN — WATER 125 MG: 1 INJECTION INTRAMUSCULAR; INTRAVENOUS; SUBCUTANEOUS at 08:30

## 2024-04-02 RX ADMIN — ALPRAZOLAM 0.25 MG: 0.5 TABLET ORAL at 14:09

## 2024-04-02 RX ADMIN — GABAPENTIN 300 MG: 300 CAPSULE ORAL at 14:09

## 2024-04-02 RX ADMIN — ALPRAZOLAM 0.25 MG: 0.5 TABLET ORAL at 22:12

## 2024-04-02 ASSESSMENT — PAIN SCALES - GENERAL: PAINLEVEL_OUTOF10: 0

## 2024-04-02 NOTE — PLAN OF CARE
Problem: Discharge Planning  Goal: Discharge to home or other facility with appropriate resources  4/2/2024 0704 by Vern Freeman RN  Outcome: Progressing  Flowsheets (Taken 4/2/2024 0655)  Discharge to home or other facility with appropriate resources: Identify barriers to discharge with patient and caregiver  4/2/2024 0701 by Vern Freeman RN  Outcome: Progressing  4/2/2024 0655 by Vern Freeman RN  Outcome: Progressing  Flowsheets (Taken 4/2/2024 0655)  Discharge to home or other facility with appropriate resources: Identify barriers to discharge with patient and caregiver     Problem: ABCDS Injury Assessment  Goal: Absence of physical injury  4/2/2024 0704 by Vern Freeman RN  Outcome: Progressing  Flowsheets (Taken 4/2/2024 0655)  Absence of Physical Injury: Implement safety measures based on patient assessment  4/2/2024 0701 by Vern Freeman RN  Outcome: Progressing  4/2/2024 0655 by Vern Freeman RN  Outcome: Progressing  Flowsheets (Taken 4/2/2024 0655)  Absence of Physical Injury: Implement safety measures based on patient assessment     Problem: Safety - Adult  Goal: Free from fall injury  4/2/2024 0704 by Vern Freeman RN  Outcome: Progressing  Flowsheets (Taken 4/2/2024 0655)  Free From Fall Injury:   Instruct family/caregiver on patient safety   Based on caregiver fall risk screen, instruct family/caregiver to ask for assistance with transferring infant if caregiver noted to have fall risk factors  4/2/2024 0701 by Vern Freeman RN  Outcome: Progressing  4/2/2024 0655 by Vern Freeman RN  Outcome: Progressing  Flowsheets (Taken 4/2/2024 0655)  Free From Fall Injury:   Instruct family/caregiver on patient safety   Based on caregiver fall risk screen, instruct family/caregiver to ask for assistance with transferring infant if caregiver noted to have fall risk factors     Problem: Discharge Planning  Goal: Discharge to home or other facility  Improved:   Monitor and assess patient's chronic conditions and comorbid symptoms for stability, deterioration, or improvement   Collaborate with multidisciplinary team to address chronic and comorbid conditions and prevent exacerbation or deterioration   Update acute care plan with appropriate goals if chronic or comorbid symptoms are exacerbated and prevent overall improvement and discharge

## 2024-04-02 NOTE — CARE COORDINATION
Signed         Transition of Care Plan:     RUR: 26% High   Prior Level of Functioning: Independent  Disposition: TBD   Follow up appointments: Pulmonology  DME needed: TBD  Transportation at discharge: Family vs BLS  IM/IMM Medicare letter given: 3/30/24  Is patient a [place] and connected with VA? No  Caregiver Contact: Daughter-in-law Emelia Morse [phone]              Sister-in-law Shoshana Wick [phone]  Discharge Caregiver contacted prior to discharge? Yes  Care Conference needed? No  Barriers to discharge:    Medical stability  High Flow oxygen  Code status now DNR  Patient discussed in IDR's and per attending patient not likely to be able to wean oxygen. Currently patient is on too high of a level of O2 to be discharged home with Hospice. CM spoke with patient and informed her regarding High oxygen requirements and Home Hospice. Patient in agreement for referral to be made to [facility] to see if patient meets criteria for GIP.Referral made through [employer]. CM notifed [place] liaison.   Cristina Allen RN,Care Management.

## 2024-04-02 NOTE — PROGRESS NOTES
[facility]  Good Help to Those in Need  [phone]     Patient Name: Milly Morse  YOB: 1945  Age: 78 y.o.    [facility] Hospice RN Note:  Hospice consult received, reviewing chart. Will follow up with Unit Nurse and Care Manager to discuss plan of care, patient status and discharge disposition within the hour.     Thank you for the opportunity to be of service to this patient.     Sadie Jones RN  Clinical Nurse Liaison  [facility]  [phone] [place]  [phone] Office   Available on Perfect Serve

## 2024-04-02 NOTE — PROGRESS NOTES
[facility]  Good Help to Those in Need  [phone]    Patient Name: Milly Morse  YOB: 1945  Age: 78 y.o.    [facility] Hospice RN Note:  Hospice consult noted. Chart reviewed. Plan of care discussed with patients nurse & care manager.   In to meet with patient.   Discussed Hospice philosophy, general plan of care, levels of care, services and on call procedures.   Milly shared with me her decline with pulmonary fibrosis and that it is genetic and she has lost 2 family members to this disease. Her brother passed in October.  While her goal would be to get home with hospice she understands that she may not be able to if she does not tolerate HF wean. She has a son locally and and son and DIL in [place] as well as her sister Shoshana who she would want present for the wean.  Her DIL, Emelia Campoverde is a nurse and she asked if I would reach out to her. I spoke with Emelia Campoverde at length regarding HF wean and support of hospice. She and her  will be here tomorrow as well as patient's sister Shoshana.  I will meet with the family tomorrow along with the physician to discuss HF wean.    Thank you for the opportunity to be of service to this patient.     Sadie Jones RN  Clinical Nurse Liaison  [facility]  [phone] [place]  [phone] Office   Available on Perfect Serve

## 2024-04-03 ENCOUNTER — APPOINTMENT (OUTPATIENT)
Facility: HOSPITAL | Age: 79
End: 2024-04-03
Payer: MEDICARE

## 2024-04-03 LAB
BACTERIA SPEC CULT: NORMAL
BACTERIA SPEC CULT: NORMAL
GLUCOSE BLD STRIP.AUTO-MCNC: 165 MG/DL (ref 65–117)
GLUCOSE BLD STRIP.AUTO-MCNC: 173 MG/DL (ref 65–117)
GLUCOSE BLD STRIP.AUTO-MCNC: 177 MG/DL (ref 65–117)
GLUCOSE BLD STRIP.AUTO-MCNC: 232 MG/DL (ref 65–117)
SERVICE CMNT-IMP: ABNORMAL
SERVICE CMNT-IMP: NORMAL
SERVICE CMNT-IMP: NORMAL

## 2024-04-03 PROCEDURE — 2580000003 HC RX 258: Performed by: SURGERY

## 2024-04-03 PROCEDURE — 82962 GLUCOSE BLOOD TEST: CPT

## 2024-04-03 PROCEDURE — 2700000000 HC OXYGEN THERAPY PER DAY

## 2024-04-03 PROCEDURE — 71045 X-RAY EXAM CHEST 1 VIEW: CPT

## 2024-04-03 PROCEDURE — 94760 N-INVAS EAR/PLS OXIMETRY 1: CPT

## 2024-04-03 PROCEDURE — 6370000000 HC RX 637 (ALT 250 FOR IP): Performed by: INTERNAL MEDICINE

## 2024-04-03 PROCEDURE — 2580000003 HC RX 258: Performed by: INTERNAL MEDICINE

## 2024-04-03 PROCEDURE — 6370000000 HC RX 637 (ALT 250 FOR IP): Performed by: SURGERY

## 2024-04-03 PROCEDURE — 94640 AIRWAY INHALATION TREATMENT: CPT

## 2024-04-03 PROCEDURE — 6360000002 HC RX W HCPCS: Performed by: SURGERY

## 2024-04-03 PROCEDURE — 6360000002 HC RX W HCPCS: Performed by: INTERNAL MEDICINE

## 2024-04-03 PROCEDURE — 2060000000 HC ICU INTERMEDIATE R&B

## 2024-04-03 RX ORDER — PREDNISONE 20 MG/1
20 TABLET ORAL DAILY
Status: DISCONTINUED | OUTPATIENT
Start: 2024-04-08 | End: 2024-04-05

## 2024-04-03 RX ORDER — PREDNISONE 20 MG/1
40 TABLET ORAL DAILY
Status: DISCONTINUED | OUTPATIENT
Start: 2024-04-06 | End: 2024-04-05

## 2024-04-03 RX ADMIN — IPRATROPIUM BROMIDE AND ALBUTEROL SULFATE 1 DOSE: .5; 3 SOLUTION RESPIRATORY (INHALATION) at 21:56

## 2024-04-03 RX ADMIN — GABAPENTIN 300 MG: 300 CAPSULE ORAL at 08:08

## 2024-04-03 RX ADMIN — BENZONATATE 200 MG: 100 CAPSULE ORAL at 13:33

## 2024-04-03 RX ADMIN — SODIUM CHLORIDE, PRESERVATIVE FREE 10 ML: 5 INJECTION INTRAVENOUS at 21:04

## 2024-04-03 RX ADMIN — IPRATROPIUM BROMIDE AND ALBUTEROL SULFATE 1 DOSE: .5; 3 SOLUTION RESPIRATORY (INHALATION) at 16:05

## 2024-04-03 RX ADMIN — MONTELUKAST 10 MG: 10 TABLET, FILM COATED ORAL at 21:04

## 2024-04-03 RX ADMIN — ALPRAZOLAM 0.25 MG: 0.5 TABLET ORAL at 13:38

## 2024-04-03 RX ADMIN — WATER 125 MG: 1 INJECTION INTRAMUSCULAR; INTRAVENOUS; SUBCUTANEOUS at 08:08

## 2024-04-03 RX ADMIN — BENZONATATE 200 MG: 100 CAPSULE ORAL at 08:08

## 2024-04-03 RX ADMIN — BENZONATATE 200 MG: 100 CAPSULE ORAL at 21:04

## 2024-04-03 RX ADMIN — LEVOTHYROXINE SODIUM 88 MCG: 0.09 TABLET ORAL at 07:23

## 2024-04-03 RX ADMIN — GABAPENTIN 300 MG: 300 CAPSULE ORAL at 21:04

## 2024-04-03 RX ADMIN — GABAPENTIN 300 MG: 300 CAPSULE ORAL at 13:33

## 2024-04-03 RX ADMIN — ENOXAPARIN SODIUM 40 MG: 100 INJECTION SUBCUTANEOUS at 08:07

## 2024-04-03 RX ADMIN — IPRATROPIUM BROMIDE AND ALBUTEROL SULFATE 1 DOSE: .5; 3 SOLUTION RESPIRATORY (INHALATION) at 09:01

## 2024-04-03 RX ADMIN — PANTOPRAZOLE SODIUM 40 MG: 40 TABLET, DELAYED RELEASE ORAL at 08:08

## 2024-04-03 ASSESSMENT — PAIN SCALES - GENERAL: PAINLEVEL_OUTOF10: 0

## 2024-04-03 NOTE — PROGRESS NOTES
Asked to see patient for transfer of care but family is in the room waiting for the Hospice family meeting. D/w Dr Enriquez. Will wait for conclusions of family meeting before proceeding.

## 2024-04-03 NOTE — PROGRESS NOTES
I participated in the IDT meeting where the plan of care for Milly Morse was discussed on [facility] 7S2 INTENSIVE CARE. I reviewed the patient's medical record prior to this meeting.    We will continue to follow and assess for spiritual/emotional support during this hospitalization.    Please contact  paging service for any immediate needs.      _____________________________  Nabila Cordon M.Div., M.S., B.C.C.  Staff   [facility]

## 2024-04-03 NOTE — H&P
[facility] ICU Progress Note        Milly Morse  1945  [id number]  3/29/2024      Assessment and plan:  IPF with possible flare:  -start methylpred 40 daily, received 125 Mg IV in ED   -start vanc and cefepime for ?  Infection   -gentle IVF resuscitation   -chest CT WO contrast  -reviewed last CT in 2/24, severe IPF with inflammation at bases, honeycomb pattern and traction bronchiectasis  -can continue OFEV    Acute on chronic hypoxic respiratory failure:  -cont HFNC  -baseline HOT 8lpm NC  -wean to keep spo2 > 87%     Sinus tach:  -better on transfer to ICU and after IVF resuscitation    ELIZA:   -cont IVF   -trend     Discussed with ED physician and discussed with case management team.      HPI: Ms Morse is a 78 yof with IPF on OFEV and recent flare in feb.  Never quite returned to baseline but did improve with prednisone taper.  Tapered down to 10 and SOB worsened.  Has been progressively worsening for the last 2 days and this am she was desatting on her home 10lpm.      Prior baseline was 8lpm.  She is not a transplant candidate due to age and GERD per pulmonary notes, which I reviewed.      Lactate elevated on admission presumed due to hypoxia     FH:  -asthma   -CAD    SH:  -nonsmoker  -has grown children  -lives alone but is surrounded by friends     OBJECTIVE  Vitals:    03/29/24 1615 03/29/24 1630 03/29/24 1645 03/29/24 1740   BP:  101/82  108/80   Pulse: (!) 107 96 97 99   Resp: 22 25 19 16   Temp:    99 °F (37.2 °C)   TempSrc:    Axillary   SpO2: 95% 97% 99%    Weight:       Height:         EXAM:   GEN: awake alert and oriented   HEENT  -Head: NC/AT;  -Eyes: PERRL, EOMI. No discharge or redness;  -Ears: External ears are normal. Normal TMs.  -Nose: Normal nares.  -Mouth and throat: MMM. Normal gums, mucosa, palate,. Good dentition.  NECK: Supple, with no masses. No JVD ++ tachycardia   CV: RRR, no m/r/g.  LUNGS: CTAB, no w/r/c.  ABD: Soft, NT/ND, no masses, NTTP  SKIN: Warm, 
METABOLIC PANEL - Abnormal; Notable for the following components:    Chloride 111 (*)     Glucose 222 (*)     All other components within normal limits   MAGNESIUM - Abnormal; Notable for the following components:    Magnesium 2.5 (*)     All other components within normal limits   CBC WITH AUTO DIFFERENTIAL - Abnormal; Notable for the following components:    RBC 3.04 (*)     Hemoglobin 10.3 (*)     Hematocrit 30.9 (*)     .6 (*)     RDW 15.6 (*)     Nucleated RBCs 0.5 (*)     nRBC 0.05 (*)     Neutrophils % 90 (*)     Lymphocytes % 4 (*)     Monocytes % 4 (*)     Immature Granulocytes 2 (*)     Neutrophils Absolute 9.2 (*)     Lymphocytes Absolute 0.4 (*)     Absolute Immature Granulocyte 0.2 (*)     All other components within normal limits   BASIC METABOLIC PANEL - Abnormal; Notable for the following components:    Chloride 112 (*)     Glucose 219 (*)     All other components within normal limits   ARTERIAL BLOOD GAS, POC - Abnormal; Notable for the following components:    POC pH 7.48 (*)     POC pCO2 33.5 (*)     All other components within normal limits   POCT GLUCOSE - Abnormal; Notable for the following components:    POC Glucose 233 (*)     All other components within normal limits   POCT GLUCOSE - Abnormal; Notable for the following components:    POC Glucose 153 (*)     All other components within normal limits   POCT GLUCOSE - Abnormal; Notable for the following components:    POC Glucose 206 (*)     All other components within normal limits   POCT GLUCOSE - Abnormal; Notable for the following components:    POC Glucose 164 (*)     All other components within normal limits   POCT GLUCOSE - Abnormal; Notable for the following components:    POC Glucose 192 (*)     All other components within normal limits   POCT GLUCOSE - Abnormal; Notable for the following components:    POC Glucose 161 (*)     All other components within normal limits   POCT GLUCOSE - Abnormal; Notable for the following

## 2024-04-03 NOTE — PROGRESS NOTES
SUBJ:  Comfortable on HHFNC O2. No new complaints    OBJ:  Vitals:    04/03/24 1000 04/03/24 1100 04/03/24 1200 04/03/24 1300   BP: 134/61 123/63 136/60 135/72   Pulse: 79 69 63 87   Resp: 24 23 21 29   Temp:   98 °F (36.7 °C)    TempSrc:   Axillary    SpO2: 97% 93% 96% 97%   Weight:       Height:         Physical Exam:  GEN: NAD  HEENT: NCAT, sclerae white  NECK: No JVD noted  CHEST: B crackles, no wheezes  CARDIAC: sinus rhythm, regular, no murmur noted  ABD: Soft, NT, +BS  EXT: Warm, no edema, normal capillary refill  NEURO: Cranial nerves intact, symmetric strength, no focal deficits noted  DERM: No lesions noted    I have reviewed the lab results from this day. Relevant abnormalities are discussed in the impression and plan    IMPRESSION:  End stage IPF with refractory hypoxemia requiring HHFNC. DNR/DNI. Patient wishes to go home with Hospicebut unable to do so on HHFNC. Family members and friends are coming into town to visit with her in the next 1-2 days    PLAN:  We discussed the following plan:  1) await arrival of other family and friends  2) anticipate attempt to transition to \"midflow\" NC O2 Friday morning and stop worrying about SpO2 as long as she is not symptomatic  3) If comfortable on midflow O2, discharge to home with Hospice  4) Consider low dose long acting opioid (MS Contin) if degree of dyspnea is unacceptable  5) Wean prednisone to 20 mg daily  6) I recommended DC Ofev and DC all therapies that are not directed to her comfort. Would continue PPI to avoid rebound hyperacidity     I will notify Dr Chicas of the above plan    Transfer out of ICU and to  Service. I will keep her on my list and promised the family that I would stay involved in her care until discharge    [facility] time 35 mins    Mynor Enriquez MD  [facility] Critical Care  [facility] 4th floor [facility] phone: [phone]  [facility] 7th floor [facility] phone: [phone]  [facility] phone: [phone]  4/3/2024

## 2024-04-04 LAB
GLUCOSE BLD STRIP.AUTO-MCNC: 138 MG/DL (ref 65–117)
GLUCOSE BLD STRIP.AUTO-MCNC: 186 MG/DL (ref 65–117)
GLUCOSE BLD STRIP.AUTO-MCNC: 222 MG/DL (ref 65–117)
GLUCOSE BLD STRIP.AUTO-MCNC: 96 MG/DL (ref 65–117)
SERVICE CMNT-IMP: ABNORMAL
SERVICE CMNT-IMP: NORMAL

## 2024-04-04 PROCEDURE — 94760 N-INVAS EAR/PLS OXIMETRY 1: CPT

## 2024-04-04 PROCEDURE — 2580000003 HC RX 258: Performed by: INTERNAL MEDICINE

## 2024-04-04 PROCEDURE — 6360000002 HC RX W HCPCS: Performed by: INTERNAL MEDICINE

## 2024-04-04 PROCEDURE — 94640 AIRWAY INHALATION TREATMENT: CPT

## 2024-04-04 PROCEDURE — 2700000000 HC OXYGEN THERAPY PER DAY

## 2024-04-04 PROCEDURE — 6370000000 HC RX 637 (ALT 250 FOR IP): Performed by: INTERNAL MEDICINE

## 2024-04-04 PROCEDURE — 2060000000 HC ICU INTERMEDIATE R&B

## 2024-04-04 PROCEDURE — 82962 GLUCOSE BLOOD TEST: CPT

## 2024-04-04 PROCEDURE — 6370000000 HC RX 637 (ALT 250 FOR IP): Performed by: SURGERY

## 2024-04-04 RX ADMIN — WATER 40 MG: 1 INJECTION INTRAMUSCULAR; INTRAVENOUS; SUBCUTANEOUS at 08:54

## 2024-04-04 RX ADMIN — ALPRAZOLAM 0.25 MG: 0.5 TABLET ORAL at 08:54

## 2024-04-04 RX ADMIN — AZITHROMYCIN DIHYDRATE 500 MG: 250 TABLET, FILM COATED ORAL at 08:53

## 2024-04-04 RX ADMIN — SODIUM CHLORIDE, PRESERVATIVE FREE 10 ML: 5 INJECTION INTRAVENOUS at 20:59

## 2024-04-04 RX ADMIN — IPRATROPIUM BROMIDE AND ALBUTEROL SULFATE 1 DOSE: .5; 3 SOLUTION RESPIRATORY (INHALATION) at 17:31

## 2024-04-04 RX ADMIN — BENZONATATE 200 MG: 100 CAPSULE ORAL at 20:58

## 2024-04-04 RX ADMIN — GABAPENTIN 300 MG: 300 CAPSULE ORAL at 20:58

## 2024-04-04 RX ADMIN — GABAPENTIN 300 MG: 300 CAPSULE ORAL at 08:54

## 2024-04-04 RX ADMIN — ALPRAZOLAM 0.25 MG: 0.5 TABLET ORAL at 21:08

## 2024-04-04 RX ADMIN — GABAPENTIN 300 MG: 300 CAPSULE ORAL at 13:38

## 2024-04-04 RX ADMIN — WATER 40 MG: 1 INJECTION INTRAMUSCULAR; INTRAVENOUS; SUBCUTANEOUS at 20:59

## 2024-04-04 RX ADMIN — BENZONATATE 200 MG: 100 CAPSULE ORAL at 13:38

## 2024-04-04 RX ADMIN — PANTOPRAZOLE SODIUM 40 MG: 40 TABLET, DELAYED RELEASE ORAL at 08:54

## 2024-04-04 RX ADMIN — ENOXAPARIN SODIUM 40 MG: 100 INJECTION SUBCUTANEOUS at 08:54

## 2024-04-04 RX ADMIN — IPRATROPIUM BROMIDE AND ALBUTEROL SULFATE 1 DOSE: .5; 3 SOLUTION RESPIRATORY (INHALATION) at 12:30

## 2024-04-04 RX ADMIN — LEVOTHYROXINE SODIUM 88 MCG: 0.09 TABLET ORAL at 06:56

## 2024-04-04 RX ADMIN — IPRATROPIUM BROMIDE AND ALBUTEROL SULFATE 1 DOSE: .5; 3 SOLUTION RESPIRATORY (INHALATION) at 21:27

## 2024-04-04 RX ADMIN — IPRATROPIUM BROMIDE AND ALBUTEROL SULFATE 1 DOSE: .5; 3 SOLUTION RESPIRATORY (INHALATION) at 08:49

## 2024-04-04 RX ADMIN — BENZONATATE 200 MG: 100 CAPSULE ORAL at 08:53

## 2024-04-04 RX ADMIN — MONTELUKAST 10 MG: 10 TABLET, FILM COATED ORAL at 20:59

## 2024-04-04 NOTE — PROGRESS NOTES
[facility]  Good Help to Those in Need  [phone]     Patient Name: Milly Morse  YOB: 1945  Age: 78 y.o.    [facility] Hospice RN Note:  Hospice bedside visit with patient. She is in good spirits and planning to visit with family today. HF wean scheduled for tomorrow morning. Hospice liaison to be present and provide emotional support to patient and family and ensure she receives comfort medications as needed. Patient hopeful she will tolerate wean and be able to go home with hospice but understands there is a possibility she will decline after wean and remain in the hospital.  Hospice will plan to admit GIP if she is lingering after her wean and needing symptom management.     Thank you for the opportunity to be of service to this patient.     Sadie Jones RN  Clinical Nurse Liaison  [facility]  [phone] [place]  [phone] Office   Available on Perfect Serve

## 2024-04-04 NOTE — PROGRESS NOTES
Hospitalist Progress Note  Willis Ramos MD  Answering service: [phone] OR [phone] from in house phone        Date of Service:  2024  NAME:  Milly Morse  :  1945  MRN:  [id number]      Admission Summary:   Milly Morse is a 78 y.o. female with COPD/chronic hypoxic respiratory failure on 10L O2 NC ATC, pulmonary fibrosis on Ofev, GERD, and hypothyroidism who was admitted to ICU on 3/29/2024 with pulmonary fibrosis exacerbation, acute on chronic hypoxic respiratory failure, sinus tachycardia, and ELIZA. She was started on IV steroid, empiric vancomycin/cefepime, IVF hydration, and HFNC. CT chest and CXR showed no acute process but chronic emphysema and interstitial lung disease. Palliative medicine and Pulmonary were consulted. Pt is on 30L/90% HHFNC at this time. Hospice was consulted yesterday, met with the family and Intensivist today. HM was consulted today for transfer of care. Pt denies any complaints at this time. Pt seen with multiple family members in the room.         Interval history / Subjective:   Patient seen and examined on high flow nasal cannula and getting breathing treatment as well patient is on 30 L 90% of oxygen not in acute distress or short of breath  Plan for family meeting and palliative care consult and hospice evaluation later this afternoon     Assessment & Plan:     Idiopathic pulmonary fibrosis end-stage exacerbation  COPD  Acute on chronic hypoxic respiratory failure   - home setting is 10L O2 NC ATC  - hypoxic on HHFNC; currently 30L and 90%  - continue pulse dose steroids: taper as ordered by Intensivist and ICU pharmacy with eventual taper to prednisone 20mg daily with Bactrim ppx  - maintain SpO2> 88%  - continue out of bed to physical therapy  - Pulmonary consultation recommendations  - Ofev stopped; no indication to resume  - resumed azithromycin TIW for  insulin lispro (HUMALOG) injection vial 0-4 Units  0-4 Units SubCUTAneous Nightly    Nintedanib Esylate CAPS 100 mg (Patient Supplied)  100 mg Oral Daily    ipratropium 0.5 mg-albuterol 2.5 mg (DUONEB) nebulizer solution 1 Dose  1 Dose Inhalation Q4H WA RT    montelukast (SINGULAIR) tablet 10 mg  10 mg Oral Nightly    benzonatate (TESSALON) capsule 200 mg  200 mg Oral TID    ALPRAZolam (XANAX) tablet 0.25 mg  0.25 mg Oral TID PRN    albuterol (PROVENTIL) (2.5 MG/3ML) 0.083% nebulizer solution 2.5 mg  2.5 mg Nebulization Q6H PRN    pantoprazole (PROTONIX) tablet 40 mg  40 mg Oral Daily    gabapentin (NEURONTIN) capsule 300 mg  300 mg Oral TID    levothyroxine (SYNTHROID) tablet 88 mcg  88 mcg Oral Daily    sodium chloride flush 0.9 % injection 5-40 mL  5-40 mL IntraVENous 2 times per day    sodium chloride flush 0.9 % injection 5-40 mL  5-40 mL IntraVENous PRN    magnesium sulfate 2000 mg in 50 mL IVPB premix  2,000 mg IntraVENous PRN    enoxaparin (LOVENOX) injection 40 mg  40 mg SubCUTAneous Daily    ondansetron (ZOFRAN-ODT) disintegrating tablet 4 mg  4 mg Oral Q8H PRN    Or    ondansetron (ZOFRAN) injection 4 mg  4 mg IntraVENous Q6H PRN    polyethylene glycol (GLYCOLAX) packet 17 g  17 g Oral Daily PRN    acetaminophen (TYLENOL) tablet 650 mg  650 mg Oral Q6H PRN    Or    acetaminophen (TYLENOL) suppository 650 mg  650 mg Rectal Q6H PRN     ______________________________________________________________________  EXPECTED LENGTH OF STAY: Unable to retrieve estimated LOS  ACTUAL LENGTH OF STAY:          6                 Willis Ramos MD

## 2024-04-05 VITALS
HEIGHT: 61 IN | OXYGEN SATURATION: 86 % | HEART RATE: 79 BPM | WEIGHT: 171.52 LBS | TEMPERATURE: 97.9 F | RESPIRATION RATE: 20 BRPM | BODY MASS INDEX: 32.38 KG/M2 | SYSTOLIC BLOOD PRESSURE: 142 MMHG | DIASTOLIC BLOOD PRESSURE: 75 MMHG

## 2024-04-05 PROBLEM — R05.3 CHRONIC COUGH: Status: ACTIVE | Noted: 2024-04-05

## 2024-04-05 PROBLEM — Z87.09 HISTORY OF COPD: Status: ACTIVE | Noted: 2024-04-05

## 2024-04-05 PROBLEM — Z87.09 HISTORY OF PULMONARY FIBROSIS: Status: ACTIVE | Noted: 2024-04-05

## 2024-04-05 LAB
GLUCOSE BLD STRIP.AUTO-MCNC: 147 MG/DL (ref 65–117)
GLUCOSE BLD STRIP.AUTO-MCNC: 159 MG/DL (ref 65–117)
SERVICE CMNT-IMP: ABNORMAL
SERVICE CMNT-IMP: ABNORMAL

## 2024-04-05 PROCEDURE — 2580000003 HC RX 258: Performed by: INTERNAL MEDICINE

## 2024-04-05 PROCEDURE — 6370000000 HC RX 637 (ALT 250 FOR IP): Performed by: INTERNAL MEDICINE

## 2024-04-05 PROCEDURE — 6360000002 HC RX W HCPCS: Performed by: INTERNAL MEDICINE

## 2024-04-05 PROCEDURE — 6360000002 HC RX W HCPCS: Performed by: NURSE PRACTITIONER

## 2024-04-05 PROCEDURE — 1200000000 HC SEMI PRIVATE

## 2024-04-05 PROCEDURE — 6370000000 HC RX 637 (ALT 250 FOR IP): Performed by: NURSE PRACTITIONER

## 2024-04-05 PROCEDURE — 94640 AIRWAY INHALATION TREATMENT: CPT

## 2024-04-05 PROCEDURE — 6360000002 HC RX W HCPCS: Performed by: HOSPITALIST

## 2024-04-05 PROCEDURE — 94760 N-INVAS EAR/PLS OXIMETRY 1: CPT

## 2024-04-05 PROCEDURE — 2700000000 HC OXYGEN THERAPY PER DAY

## 2024-04-05 PROCEDURE — 99223 1ST HOSP IP/OBS HIGH 75: CPT | Performed by: NURSE PRACTITIONER

## 2024-04-05 PROCEDURE — 82962 GLUCOSE BLOOD TEST: CPT

## 2024-04-05 RX ORDER — LORAZEPAM 1 MG/1
1 TABLET ORAL EVERY 4 HOURS PRN
Qty: 84 TABLET | Refills: 0 | Status: SHIPPED
Start: 2024-04-05 | End: 2024-04-19

## 2024-04-05 RX ORDER — BENZONATATE 100 MG/1
200 CAPSULE ORAL 3 TIMES DAILY
Status: DISCONTINUED | OUTPATIENT
Start: 2024-04-05 | End: 2024-04-06 | Stop reason: HOSPADM

## 2024-04-05 RX ORDER — PREDNISONE 20 MG/1
20 TABLET ORAL DAILY
Status: DISCONTINUED | OUTPATIENT
Start: 2024-04-06 | End: 2024-04-06 | Stop reason: HOSPADM

## 2024-04-05 RX ORDER — CODEINE PHOSPHATE AND GUAIFENESIN 10; 100 MG/5ML; MG/5ML
10 SOLUTION ORAL EVERY 4 HOURS PRN
Qty: 420 ML | Refills: 0 | Status: SHIPPED
Start: 2024-04-05 | End: 2024-04-19

## 2024-04-05 RX ORDER — MORPHINE SULFATE 20 MG/ML
10 SOLUTION ORAL EVERY 4 HOURS PRN
Status: DISCONTINUED | OUTPATIENT
Start: 2024-04-05 | End: 2024-04-06 | Stop reason: HOSPADM

## 2024-04-05 RX ORDER — CODEINE PHOSPHATE AND GUAIFENESIN 10; 100 MG/5ML; MG/5ML
10 SOLUTION ORAL EVERY 4 HOURS PRN
Status: DISCONTINUED | OUTPATIENT
Start: 2024-04-05 | End: 2024-04-06 | Stop reason: HOSPADM

## 2024-04-05 RX ORDER — MORPHINE SULFATE 20 MG/ML
10 SOLUTION ORAL EVERY 4 HOURS PRN
Qty: 30 ML | Refills: 0 | Status: SHIPPED
Start: 2024-04-05 | End: 2024-04-19

## 2024-04-05 RX ORDER — POLYETHYLENE GLYCOL 3350 17 G/17G
17 POWDER, FOR SOLUTION ORAL DAILY PRN
Qty: 14 PACKET | Refills: 0 | Status: SHIPPED | OUTPATIENT
Start: 2024-04-05 | End: 2024-04-19

## 2024-04-05 RX ORDER — MIDAZOLAM HYDROCHLORIDE 2 MG/2ML
1 INJECTION, SOLUTION INTRAMUSCULAR; INTRAVENOUS
Status: COMPLETED | OUTPATIENT
Start: 2024-04-05 | End: 2024-04-05

## 2024-04-05 RX ORDER — MORPHINE SULFATE 2 MG/ML
2 INJECTION, SOLUTION INTRAMUSCULAR; INTRAVENOUS EVERY 4 HOURS PRN
Status: DISCONTINUED | OUTPATIENT
Start: 2024-04-05 | End: 2024-04-05

## 2024-04-05 RX ORDER — MORPHINE SULFATE 2 MG/ML
2 INJECTION, SOLUTION INTRAMUSCULAR; INTRAVENOUS
Status: DISCONTINUED | OUTPATIENT
Start: 2024-04-05 | End: 2024-04-06 | Stop reason: HOSPADM

## 2024-04-05 RX ORDER — PREDNISONE 20 MG/1
20 TABLET ORAL DAILY
Qty: 3 TABLET | Refills: 0 | Status: SHIPPED | OUTPATIENT
Start: 2024-04-06 | End: 2024-04-09

## 2024-04-05 RX ORDER — PREDNISONE 20 MG/1
40 TABLET ORAL ONCE
Status: COMPLETED | OUTPATIENT
Start: 2024-04-05 | End: 2024-04-05

## 2024-04-05 RX ORDER — LORAZEPAM 1 MG/1
1 TABLET ORAL EVERY 4 HOURS PRN
Status: DISCONTINUED | OUTPATIENT
Start: 2024-04-05 | End: 2024-04-06 | Stop reason: HOSPADM

## 2024-04-05 RX ORDER — MIDAZOLAM HYDROCHLORIDE 2 MG/2ML
2 INJECTION, SOLUTION INTRAMUSCULAR; INTRAVENOUS ONCE
Status: COMPLETED | OUTPATIENT
Start: 2024-04-05 | End: 2024-04-05

## 2024-04-05 RX ADMIN — ENOXAPARIN SODIUM 40 MG: 100 INJECTION SUBCUTANEOUS at 09:09

## 2024-04-05 RX ADMIN — PANTOPRAZOLE SODIUM 40 MG: 40 TABLET, DELAYED RELEASE ORAL at 09:09

## 2024-04-05 RX ADMIN — IPRATROPIUM BROMIDE AND ALBUTEROL SULFATE 1 DOSE: .5; 3 SOLUTION RESPIRATORY (INHALATION) at 11:14

## 2024-04-05 RX ADMIN — MIDAZOLAM HYDROCHLORIDE 1 MG: 1 INJECTION, SOLUTION INTRAMUSCULAR; INTRAVENOUS at 13:39

## 2024-04-05 RX ADMIN — ALPRAZOLAM 0.25 MG: 0.5 TABLET ORAL at 20:37

## 2024-04-05 RX ADMIN — Medication 10 ML: at 16:37

## 2024-04-05 RX ADMIN — BENZONATATE 200 MG: 100 CAPSULE ORAL at 09:10

## 2024-04-05 RX ADMIN — MORPHINE SULFATE 2 MG: 2 INJECTION, SOLUTION INTRAMUSCULAR; INTRAVENOUS at 11:55

## 2024-04-05 RX ADMIN — Medication 10 ML: at 20:34

## 2024-04-05 RX ADMIN — WATER 40 MG: 1 INJECTION INTRAMUSCULAR; INTRAVENOUS; SUBCUTANEOUS at 09:09

## 2024-04-05 RX ADMIN — MIDAZOLAM HYDROCHLORIDE 2 MG: 1 INJECTION, SOLUTION INTRAMUSCULAR; INTRAVENOUS at 14:12

## 2024-04-05 RX ADMIN — GABAPENTIN 300 MG: 300 CAPSULE ORAL at 09:10

## 2024-04-05 RX ADMIN — BENZONATATE 200 MG: 100 CAPSULE ORAL at 18:41

## 2024-04-05 RX ADMIN — LEVOTHYROXINE SODIUM 88 MCG: 0.09 TABLET ORAL at 07:21

## 2024-04-05 RX ADMIN — ALPRAZOLAM 0.25 MG: 0.5 TABLET ORAL at 11:55

## 2024-04-05 RX ADMIN — IPRATROPIUM BROMIDE AND ALBUTEROL SULFATE 1 DOSE: .5; 3 SOLUTION RESPIRATORY (INHALATION) at 08:24

## 2024-04-05 RX ADMIN — PREDNISONE 40 MG: 20 TABLET ORAL at 18:40

## 2024-04-05 RX ADMIN — SODIUM CHLORIDE, PRESERVATIVE FREE 10 ML: 5 INJECTION INTRAVENOUS at 09:26

## 2024-04-05 ASSESSMENT — PAIN SCALES - GENERAL: PAINLEVEL_OUTOF10: 0

## 2024-04-05 ASSESSMENT — PAIN DESCRIPTION - DESCRIPTORS: DESCRIPTORS: ACHING

## 2024-04-05 NOTE — PROGRESS NOTES
[facility]  Good Help to Those in Need  [phone]     Patient Name: Milly Morse  YOB: 1945  Age: 78 y.o.    [facility] Hospice RN Note:  Hospice liaison returned call to KYLE Campoverde. She wanted to review plan for HF wean tomorrow.   I assured her that hospice liaison will be bedside in am for emotional support and to make sure medications are in place for the wean.  She was very appreciative of my call.    Thank you for the opportunity to be of service to this patient.     Sadie Jones RN  Clinical Nurse Liaison  [facility]  [phone] [place]  [phone] Office   Available on Perfect Serve

## 2024-04-05 NOTE — PROGRESS NOTES
Patient is being transferred to 6E room 624. Patient was transition to hospice with palliative care on board, and changed her level of care to comfort measure. VSS, she was wean from  30% high flow to 10L nasal canula, pt is asymptomatic and able to tolerate the weaning process. Family was at the bedside and updated for the POC. All patient's belongings are sent with patient. Report was given to the receiving RN (Annette). Patient left the unit to 6E transported on her bed by transport tech and myself around 1710.

## 2024-04-05 NOTE — PROGRESS NOTES
[facility]  Good Help to Those in Need  [phone]    Hospice Nursing PRE-Admission   Discharge Summary  Patient Name: Milly Morse  YOB: 1945  Age: 78 y.o.       Date of PLANNED Hospice Admission: 2024  Hospice Attending: Dr. Castro Goss  Primary Care Physician: Babak Roque MD     Home Hospice Address:  [address] [place] [address] [address] [place] [address]     Primary Contact and Phone:  [phone] (Home)   Ziyad Morse: [phone]    ADVANCE CARE PLANNING    Code Status: DNR  Durable DNR: [x]  Yes  []  No      2024     1:14 PM   Demographics   Marital Status Single       Confucianism: Quaker   Home: TBD    HOSPICE SUMMARY     Verbal CTI of terminal diagnosis with life expectancy of 6 months or less received from: Dr. Howe    For the Hospice Diagnosis of: End Stage Pulmonary Fibrosis    NCD: Requested      CLINICAL INFORMATION   Allergies: No Known Allergies      Currently this patient has:  [x] Supplemental O2           Does patient have an AICD device:    [x] No          COVID Screening: Positive   Negative      ASSESSMENT & PLAN     1.  ACUITY ASSESSMENT ACROSS DISCIPLINES:  CLI 1 PSY 1 SPI 0 ENV 2 (for need additional care-givers)    2.  Symptom Issues Identified: Respiratory distress, cough and anxiety    3.  Spiritual Issues Identified: TBD    4.  Psych/ Social/ Emotional Issues Identified: Psychosocial: Psychosocial: patient lives alone in [place], [place]. 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 Her brother passed away in October. She has a son locally and and son and DIL in [place] as well as her

## 2024-04-05 NOTE — PROGRESS NOTES
of bed to physical therapy  - Pulmonary consultation recommendations  - Ofev stopped; no indication to resume  - resumed azithromycin TIW for antiinflammatory effect      - Hypoxic on HHFNC; currently 30L and 90%--> plan to wean off with anxiety medication to wean down to 10 L and then home with hospice     ELIZA (POA), resolved  GERD  Hypothyroidism      Plan to transition to nasal cannula oxygen 10 L and discharged home with hospice tomorrow     Code status: DNR  Prophylaxis: Lovenox  Care Plan discussed with: RN/ PT  Anticipated Disposition: tbd     Principal Problem:    IPF (idiopathic pulmonary fibrosis) (HCC)  Resolved Problems:    * No resolved hospital problems. *            Review of Systems:   A comprehensive review of systems was negative.         Vital Signs:    Last 24hrs VS reviewed since prior progress note. Most recent are:  BP (!) 156/82   Pulse 80   Temp 97.7 °F (36.5 °C) (Oral)   Resp 20   Ht 1.549 m (5' 1\")   Wt 77.8 kg (171 lb 8.3 oz)   SpO2 96%   BMI 32.41 kg/m²      No intake or output data in the 24 hours ending 04/05/24 1522       Physical Examination:     I had a face to face encounter with this patient and independently examined them on 4/5/2024 as outlined below:          General : alert x 3, awake, no acute distress, on high flow nasal cannula  HEENT:  moist mucus membrane  Neck: supple, no JVD, no meningeal signs  Chest: No wheezing fine Rales  CVS: S1 S2 heard, tachycardia  Abd: soft/ non tender, non distended, BS physiological,   Ext: no clubbing, no cyanosis, no edema, brisk 2+ DP pulses  Neuro/Psych: pleasant mood and affect,            Data Review:    I personally reviewed  Image and LABS    I have independently reviewed and interpreted patient's lab and all other diagnostic data    Notes reviewed from all clinical/nonclinical/nursing services involved in patient's clinical care. Care coordination discussions were held with appropriate clinical/nonclinical/ nursing providers  based on care coordination needs.     Labs:     No results for input(s): \"WBC\", \"HGB\", \"HCT\", \"PLT\" in the last 72 hours.    No results for input(s): \"NA\", \"K\", \"CL\", \"CO2\", \"BUN\", \"CREA\", \"GLU\", \"CA\", \"MG\", \"PHOS\", \"URICA\" in the last 72 hours.    No results for input(s): \"ALT\", \"TP\", \"ALB\", \"GLOB\", \"GGT\", \"AML\" in the last 72 hours.    Invalid input(s): \"SGOT\", \"GPT\", \"AP\", \"TBIL\", \"TBILI\", \"AMYP\", \"LPSE\", \"HLPSE\"  No results for input(s): \"INR\", \"APTT\" in the last 72 hours.    Invalid input(s): \"PTP\"   No results for input(s): \"TIBC\", \"FERR\" in the last 72 hours.    Invalid input(s): \"FE\", \"PSAT\"   No results found for: \"FOL\", \"RBCF\"   No results for input(s): \"PH\", \"PCO2\", \"PO2\" in the last 72 hours.  No results for input(s): \"CPK\" in the last 72 hours.    Invalid input(s): \"CPKMB\", \"CKNDX\", \"TROIQ\"  No results found for: \"CHOL\", \"CHOLX\", \"CHLST\", \"CHOLV\", \"HDL\", \"HDLC\", \"LDL\", \"LDLC\", \"TGLX\", \"TRIGL\"  No results found for: \"GLUCPOC\"        Medications Reviewed:     Current Facility-Administered Medications   Medication Dose Route Frequency    morphine (PF) injection 2 mg  2 mg IntraVENous Q4H PRN    guaiFENesin-codeine (guaiFENesin AC) 100-10 MG/5ML liquid 10 mL  10 mL Oral Q4H PRN    potassium chloride (KLOR-CON) extended release tablet 40 mEq  40 mEq Oral PRN    Or    potassium bicarb-citric acid (EFFER-K) effervescent tablet 40 mEq  40 mEq Oral PRN    Or    potassium chloride 10 mEq/100 mL IVPB (Peripheral Line)  10 mEq IntraVENous PRN    glucose chewable tablet 16 g  4 tablet Oral PRN    dextrose bolus 10% 125 mL  125 mL IntraVENous PRN    Or    dextrose bolus 10% 250 mL  250 mL IntraVENous PRN    glucagon injection 1 mg  1 mg SubCUTAneous PRN    dextrose 10 % infusion   IntraVENous Continuous PRN    ALPRAZolam (XANAX) tablet 0.25 mg  0.25 mg Oral TID PRN    albuterol (PROVENTIL) (2.5 MG/3ML) 0.083% nebulizer solution 2.5 mg  2.5 mg Nebulization Q6H PRN    sodium chloride flush 0.9 % injection 5-40 mL  5-40

## 2024-04-05 NOTE — PROGRESS NOTES
[facility]  Good Help to Those in Need  [phone]    Hospice Liaison Note   Patient Name: Milly Morse  YOB: 1945  Age: 78 y.o.      Hospice Liaison Note:    Chart reviewed for updates in plan of care.    Plan:  Family meeting this am around 9:30.  Continue to offer Hospice support/education.    Please call Hospice team to offer support for patient, family or staff.    Thank you for your coordination with the hospice plan of care.    09:30: Bedside. Pt alert, oriented. Pt states that she is expecting other family to arrive; she appears anxious. Update from bedside RNAlejandrina. Pt weaned from high flow to midflow 15 liters NC overnight. This am, she became symptomatic, and oxygen was returned to High Flow 30 Liters.    Plan to review with attending and request PRN medications for symptoms of respiratory distress/anxiety prior to further weaning.    09:50: Perfect Serve message sent to attending with suggestion for PRN symptom medications, and to clarify oxygen weaning process and level of care.    10:05: Bedside. Reviewed goals of care with pt; she states that she wants to return home with her family as caregivers. Pt states that she and her children are discussing 24 hour care giving, and will be with her this weekend.    Pt admits to feeling restless and short of breath. Reviewed discussion with attending, and use of PRN medications prior to attempt further weaning. Pt states she agree's with this plan.  Bedside RN to provide PRN medications when ordered.    Hospice Liaison to return bedside.    11:15: Bedside with multiple family members:  Pt son Keron and his wife, Emelia Campoverde. Pt grandson, Martin and granddaughter, Monique. Pt sister, Fabiola. Family share that a family member works with [facility].  Hospice Liaison asked pt and her family bedside, if they were wanting to work with [facility] for home hospice services. Pt stated that she wanted to use [facility]. Family  bedside stated they agreed with patient.    Reviewing plan of care for today, and oxygen weaning to be done by medical team. Reviewed pt to transition to comfort care, and plans for discharge if stable.    Pt states she understands plan to wean oxygen once medications are provided; IV Morphine 2 mg and Oral Xanax 0.25 mg. Pt to decide when she is comfortable and ready to begin with oxygen wean from 30 Liters to 15 Liters.    Perfect Serve message sent to Dr. Ramos to round bedside and address Comfort Care status.    12:10: Perfect Serve message received from Dr. Ramos. Pt is not Comfort Care.    PLAN: Hospice will continue to support pt and family during weaning process. If patient becomes symptomatic, and cannot tolerate oral medications, will review for [facility] inpatient hospice criteria with Dr. Howe, on call for Dr. Goss.    Pt states her goal is to return home with [facility]. If patient is stable and able to return home, [facility] will plan for home admission on Saturday, if pt/family/attending are in agreement.    13:20: Bedside with palliative team NP.    14:28: Pt appears to be sleeping. Family friend bedside reading w patient. Oxygen NC now at 12 Liters. Pt appears without distress.      17:50: Bedside. Pt appears comfortable. Admits to cough less and breathing with minor discomfort.      Maya Jones RN, [facility]  Hospice Nurse Liaison  [phone] Mobile  [phone] Office

## 2024-04-05 NOTE — PLAN OF CARE
Problem: Discharge Planning  Goal: Discharge to home or other facility with appropriate resources  Outcome: Progressing     Problem: Discharge Planning  Goal: Discharge to home or other facility with appropriate resources  Outcome: Progressing     Problem: ABCDS Injury Assessment  Goal: Absence of physical injury  Outcome: Progressing     Problem: Safety - Adult  Goal: Free from fall injury  Outcome: Progressing     Problem: Safety - Adult  Goal: Free from fall injury  Outcome: Progressing     Problem: Skin/Tissue Integrity  Goal: Absence of new skin breakdown  Description: 1.  Monitor for areas of redness and/or skin breakdown  2.  Assess vascular access sites hourly  3.  Every 4-6 hours minimum:  Change oxygen saturation probe site  4.  Every 4-6 hours:  If on nasal continuous positive airway pressure, respiratory therapy assess nares and determine need for appliance change or resting period.  Outcome: Progressing     Problem: Pain  Goal: Verbalizes/displays adequate comfort level or baseline comfort level  Outcome: Progressing     Problem: Chronic Conditions and Co-morbidities  Goal: Patient's chronic conditions and co-morbidity symptoms are monitored and maintained or improved  Outcome: Progressing

## 2024-04-05 NOTE — CONSULTS
Palliative Medicine  Patient Name: Milly Morse  YOB: 1945  MRN: [id number]  Age: 78 y.o.  Gender: female    Date of Initial Consult: 2024  Date of Service: 2024  Time: 1:55 PM  Provider: SWATHI Boothe NP  Hospital Day: 8  Admit Date: 3/29/2024  Referring Provider: Richard        Reasons for Consultation:  End Stage Disease, symptom management     HISTORY OF PRESENT ILLNESS (HPI):   Milly Morse is a 78 y.o. female with a past medical history of pulmonary fibrosis- end stage, COPD/chronic hypoxic respiratory failure- on Ofev, GERD (s/p Nissen ), hypothyroidism who was admitted on 3/29/2024 with a diagnosis of pulmonary fibrosis exacerbation, acute on chronic hypoxic respiratory failure, ELIZA    Initially in the ICU .   Patient with recent hospitalizations for respiratory failure including a prolonged hospitalization    Patient has made the decision to transfer to hospice care and the plan is home with hospice.  Goal is to wean her mid-flow oxygen to at least 8 LPM before her discharge    Psychosocial: Psychosocial: patient lives alone in [place], [place]. 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   Consulted to manage comfort meds  Patient reports feeling shaky   Does not feel SOB States coughing bothers her more and leads to desats.   Trial of 1 dose of 1 mg IV versed which had minimal effect.   Added 2 mg dose with relief    77.8 kg (171 lb 8.3 oz), SpO2 96 %.    PHYSICAL ASSESSMENT:   General: [x] Oriented x3  [] Well appearing  [] Intubated  []Ill appearing  []Other:  Mental Status: [x] Normal mental status exam  [] Drowsy  [] Confused  []Other:  Cardiovascular: [x] Regular rate/rhythm  [] Arrhythmia  [] Other:  Chest: [] Effort normal  [x]Lungs clear  [] Respiratory distress  []Tachypnea  [] Other:   15 LPM initially late this am (has been as high as 30 LPM)  Abdomen: [] Soft/non-tender  [x] Normal appearance  [] Distended  [] Ascites  [] Other:  Neurological: [x] Normal speech  [] Normal sensation  []Deficits present:  Extremity: [] Normal skin color/temp  [] Clubbing/cyanosis  [] No edema  [] Other:    Wt Readings from Last 15 Encounters:   04/05/24 77.8 kg (171 lb 8.3 oz)   02/27/24 74.4 kg (164 lb 0.4 oz)   02/08/24 75.9 kg (167 lb 4 oz)   02/05/24 72 kg (158 lb 11.7 oz)        Current Diet: ADULT DIET; Regular; No dairy, milk, sausage, sausage gravy, coffee, or oatmeal       PSYCHOSOCIAL/SPIRITUAL SCREENING:   Palliative IDT has assessed this patient for cultural preferences / practices and a referral made as appropriate to needs (Cultural Services, Patient Advocacy, Ethics, etc.)    Spiritual Affiliation: Jainism    Any spiritual / Sabianism concerns:  [] Yes /  [x] No   If \"Yes\" to discuss with pastoral care during IDT     Does caregiver feel burdened by caring for their loved one:   [] Yes /  [x] No /  [] No Caregiver Present/Available [] No Caregiver [] Pt Lives at Facility  If \"Yes\" to discuss with social work during IDT    Anticipatory grief assessment:   [x] Normal  / [] Maladaptive     If \"Maladaptive\" to discuss with social work during IDT    ESAS Anxiety:      ESAS Depression:          LAB AND IMAGING FINDINGS:   Objective data reviewed:  labs, records, medication use, vitals, and chart     FINAL COMMENTS   Thank you for allowing Palliative Medicine to participate in the care of Milly Morse.    Only check if

## 2024-04-05 NOTE — CARE COORDINATION
BRENDA:    CM notified by hospice  RN, Maya to setup transport for Saturday going home with [facility] hospice services. Cm scheduled transport with [facility] stretcher at 9:30 a.m. with oxygen.       YANIRA StarkLIZANDRO  Care Management Department  Ph:[phone]

## 2024-04-05 NOTE — PROGRESS NOTES
04/05/24 1115   Oxygen Therapy/Pulse Ox   O2 Therapy Oxygen humidified   O2 Device (S)  Heated high flow cannula  (found on)   O2 Flow Rate (L/min) 30 L/min   FiO2  93 %   Pulse 84   Respirations 27   SpO2 94 %

## 2024-04-06 ENCOUNTER — HOME CARE VISIT (OUTPATIENT)
Facility: HOME HEALTH | Age: 79
End: 2024-04-06
Payer: MEDICARE

## 2024-04-06 VITALS
HEART RATE: 71 BPM | SYSTOLIC BLOOD PRESSURE: 114 MMHG | DIASTOLIC BLOOD PRESSURE: 77 MMHG | OXYGEN SATURATION: 83 % | RESPIRATION RATE: 20 BRPM

## 2024-04-06 PROCEDURE — 6370000000 HC RX 637 (ALT 250 FOR IP): Performed by: NURSE PRACTITIONER

## 2024-04-06 PROCEDURE — G0299 HHS/HOSPICE OF RN EA 15 MIN: HCPCS

## 2024-04-06 PROCEDURE — G0155 HHCP-SVS OF CSW,EA 15 MIN: HCPCS

## 2024-04-06 RX ADMIN — BENZONATATE 200 MG: 100 CAPSULE ORAL at 07:21

## 2024-04-06 RX ADMIN — LORAZEPAM 1 MG: 1 TABLET ORAL at 06:07

## 2024-04-06 RX ADMIN — LORAZEPAM 1 MG: 1 TABLET ORAL at 09:31

## 2024-04-06 RX ADMIN — Medication 10 ML: at 05:48

## 2024-04-06 RX ADMIN — PREDNISONE 20 MG: 20 TABLET ORAL at 09:31

## 2024-04-06 RX ADMIN — Medication 10 ML: at 09:33

## 2024-04-06 ASSESSMENT — ENCOUNTER SYMPTOMS
DYSPNEA ACTIVITY LEVEL: AFTER AMBULATING LESS THAN 10 FT
COUGH: 1
COUGH CHARACTERISTICS: NON-PRODUCTIVE

## 2024-04-06 NOTE — PLAN OF CARE
Patient being discharged home with [facility]. Peripheral IV removed prior to discharge, tip intact, minimal blood loss. Patient is alert and oriented X4, and on 8L of midflow oxygen.      Problem: Discharge Planning  Goal: Discharge to home or other facility with appropriate resources  4/6/2024 0917 by Jovan Maldonado RN  Outcome: Adequate for Discharge  4/6/2024 0503 by Marcie Zamora RN  Outcome: Progressing     Problem: ABCDS Injury Assessment  Goal: Absence of physical injury  Outcome: Adequate for Discharge     Problem: Safety - Adult  Goal: Free from fall injury  Outcome: Adequate for Discharge     Problem: Discharge Planning  Goal: Discharge to home or other facility with appropriate resources  4/6/2024 0917 by Jovan Maldonado RN  Outcome: Adequate for Discharge  4/6/2024 0503 by Marcie Zamora RN  Outcome: Progressing     Problem: Safety - Adult  Goal: Free from fall injury  Outcome: Adequate for Discharge     Problem: Skin/Tissue Integrity  Goal: Absence of new skin breakdown  Description: 1.  Monitor for areas of redness and/or skin breakdown  2.  Assess vascular access sites hourly  3.  Every 4-6 hours minimum:  Change oxygen saturation probe site  4.  Every 4-6 hours:  If on nasal continuous positive airway pressure, respiratory therapy assess nares and determine need for appliance change or resting period.  Outcome: Adequate for Discharge     Problem: Pain  Goal: Verbalizes/displays adequate comfort level or baseline comfort level  Outcome: Adequate for Discharge     Problem: Chronic Conditions and Co-morbidities  Goal: Patient's chronic conditions and co-morbidity symptoms are monitored and maintained or improved  Outcome: Adequate for Discharge

## 2024-04-06 NOTE — HOSPICE
Milly Morse  1945    78     Principle Hospice Diagnosis: pulmonary fibrosis    Diagnoses RELATED to the terminal prognosis:  anxiety  dyspnea with minimal exertion    Unrelated Diagnosis:  GERD   Hypothyroidism       Date of Hospice Admission: 2024  Hospice Attending Elected by Patient: Castro Goss  Primary Care Physician: Babak Roque     Admitting RN: Brunilda Jewell CM: Kimmy  : Sobeida  : Joao Guerrero DNR: yes, in home     Service: no      Home: did not discuss     Direct Observation:  a&o x 4. sitting in chair with feet up. o2 at 8L via NC. appetite fair. denies pain.  resp even and unlabored while sitting. when pt coughs, she becomes anxious and dyspneic.  no coughing noted during assessment. lung sounds clear with rhonchi and crackles heard in bases. bowel sounds active. last BM today. denies incontinence with spontaneous voiding when coughing.  denies falls. uses rollator to ambulate.      Palliative Performance Scale: 40        ER Visits/ Hospitalizations in past year: 5      Onset Date of Hospice Diagnosis:       Summary of Disease Progression Leading to Hospice Diagnosis:    Milly Morse is a 78 y.o. female with a past medical history of pulmonary fibrosis- end stage, COPD/chronic hypoxic respiratory failure- on Ofev, GERD (s/p Nissen ), hypothyroidism who was admitted on 3/29/2024 with a diagnosis of pulmonary fibrosis exacerbation, acute on chronic hypoxic respiratory failure, ELIZA Initially in the ICU .   Patient with recent hospitalizations for respiratory failure including a prolonged hospitalization Patient has made the decision to transfer to hospice care and the plan is home with hospice. Goal is to wean her mid-flow oxygen to at least 8 LPM before her discharge         Use LCD Guidelines and list features:   F. Pulmonary Disease  Patients will be considered to be in the terminal stage of pulmonary disease (life expectancy

## 2024-04-06 NOTE — DISCHARGE SUMMARY
Discharge Summary  {Complete discharge medication reconciliation prior to completing the discharge summary or refresh all SmartLinks after completing medication reconciliation to ensure an accurate discharge medication list:[id number]}  Date: 4/6/2024  Patient Name: Milly Morse    YOB: 1945     Age: 78 y.o.    Admit Date: 3/29/2024  Discharge Date:  Discharge Condition:    Admission Diagnosis  IPF (idiopathic pulmonary fibrosis) ([facility]) [J84.112];History of COPD [Z87.09];Acute respiratory failure with hypoxia ([facility]) [J96.01];History of pulmonary fibrosis [Z87.09]      Discharge Diagnosis  Principal Problem:    IPF (idiopathic pulmonary fibrosis) ([facility])  Active Problems:    History of COPD    History of pulmonary fibrosis    Chronic cough  Resolved Problems:    * No resolved hospital problems. *      Hospital Stay  Narrative of Hospital Course:      Consultants:  IP CONSULT TO PULMONOLOGY  IP CONSULT TO HOSPICE  IP CONSULT TO HOSPICE  IP CONSULT TO PALLIATIVE CARE  IP CONSULT TO PALLIATIVE CARE    Surgeries/procedures Performed:      Treatments:            Discharge Plan/Disposition:  Home    Hospital/Incidental Findings Requiring Follow Up:    Patient Instructions:    Diet:    Activity:  For number of days (if applicable):      Other Instructions:    Provider Follow-Up:   No follow-ups on file.     Significant Diagnostic Studies:    Recent Labs:  Admission on 03/29/2024  No results displayed because visit has over 200 results.    ------------    Radiology last 7 days:  XR CHEST PORTABLE    Result Date: 4/3/2024  Left lower lobe atelectasis. Changes of pulmonary fibrosis.       [unfilled]    Discharge Medications    Current Discharge Medication List    START taking these medications    Morphine Sulfate (MORPHINE 20MG/ML) SOLN concentrated solution  Take 0.5 mLs by mouth every 4 hours as needed for Pain for up to 14 days. Max Daily Amount: 60 mg  Qty: 30 mL Refills: 0  Comments: Reduce doses taken as  where to get these medications is not yet available    Ask your nurse or doctor about these medications  guaiFENesin-codeine 100-10 MG/5ML liquid  LORazepam 1 MG tablet  morphine 20MG/ML Soln concentrated solution            Significant Diagnostic Studies:   No results for input(s): \"WBC\", \"HGB\", \"HCT\", \"PLT\" in the last 72 hours.  No results for input(s): \"NA\", \"K\", \"CL\", \"CO2\", \"GLU\", \"BUN\", \"CREA\", \"CA\", \"MG\", \"PHOS\", \"ALB\", \"ALT\", \"INR\" in the last 72 hours.    Invalid input(s): \"TBIL\", \"TBILI\", \"SGOT\"  Lab Results   Component Value Date/Time    TSH 3.16 09/25/2021 04:13 AM          Other medical conditions are listed in the active hospital problem list section; these and other pertinent data were taken into consideration when the treatment plan was developed and customized to this patient's unique overall circumstances and needs.    High complexity decision making was performed for this patient who is at high risk for decompensation with multiple organ involvement.     Today total floor/unit time was 40  minutes while caring for this patient and greater than 50% of that time was spent with patient (and/or family) coordinating patient’s clinical issues.     Isai Akers MD                     4/8/2024

## 2024-04-06 NOTE — PROGRESS NOTES
[facility]  Good Help to Those in Need  [phone]    Hospice Liaison Note   Patient Name: Milly Morse  YOB: 1945  Age: 78 y.o.      Hospice Liaison Note:    Chart reviewed for updates in plan of care.  Bedside patient and her son, Keron. Both appear to be sleeping. Pt appears stable. Keron woke and agrees pt is comfortable and ready to discharge home.    Plan: Discharge home with [facility].      Please call Hospice team to offer support for patient, family or staff.    Thank you for your coordination with the hospice plan of care      Maya Jones RN, [facility]  Hospice Nurse Liaison  [phone] [place]  [phone] Office

## 2024-04-06 NOTE — PLAN OF CARE
Problem: Discharge Planning  Goal: Discharge to home or other facility with appropriate resources  Outcome: Progressing     Problem: Discharge Planning  Goal: Discharge to home or other facility with appropriate resources  Outcome: Progressing

## 2024-04-07 ENCOUNTER — HOME CARE VISIT (OUTPATIENT)
Age: 79
End: 2024-04-07
Payer: MEDICARE

## 2024-04-07 VITALS
RESPIRATION RATE: 16 BRPM | SYSTOLIC BLOOD PRESSURE: 138 MMHG | TEMPERATURE: 98.4 F | DIASTOLIC BLOOD PRESSURE: 80 MMHG | HEART RATE: 82 BPM

## 2024-04-07 PROCEDURE — G0299 HHS/HOSPICE OF RN EA 15 MIN: HCPCS

## 2024-04-08 ENCOUNTER — HOME CARE VISIT (OUTPATIENT)
Facility: HOME HEALTH | Age: 79
End: 2024-04-08
Payer: MEDICARE

## 2024-04-08 PROCEDURE — G0156 HHCP-SVS OF AIDE,EA 15 MIN: HCPCS

## 2024-04-09 ENCOUNTER — HOME CARE VISIT (OUTPATIENT)
Age: 79
End: 2024-04-09
Payer: MEDICARE

## 2024-04-09 NOTE — HOSPICE
This  provided initial spiritual assessment visit at patient's home.  was greeted by son, Keron, and daughter-in-law, Emelia Campoverde, at door.  engaged in life and spiritual review with family members. Patient identifies at [employer] and was also involved in a [employer], but currently has no [employer].  and loved ones visited at bedside of patient, who was in bed, in and out of wakefulness, able to weakly converse.  provided prayer, compassionate presence, a Anza spiritual reading, and engaged patient in reflection upon her grief, spiritual beliefs, and concerns for loved ones.  provided pastoral support for anticipatory grief to family members. Family inquired about 's availability to lead the patient's .  explained her availability and reiterated contact information for  and hospice team.  will continue to offer spiritual support to patient and loved ones for grief support, legacy work, and spiritual comfort.

## 2024-04-10 ENCOUNTER — HOME CARE VISIT (OUTPATIENT)
Age: 79
End: 2024-04-10
Payer: MEDICARE

## 2024-04-10 ENCOUNTER — TELEPHONE (OUTPATIENT)
Facility: HOSPITAL | Age: 79
End: 2024-04-10

## 2024-04-10 ENCOUNTER — HOME CARE VISIT (OUTPATIENT)
Facility: HOME HEALTH | Age: 79
End: 2024-04-10
Payer: MEDICARE

## 2024-04-10 PROCEDURE — G0156 HHCP-SVS OF AIDE,EA 15 MIN: HCPCS

## 2024-04-10 NOTE — TELEPHONE ENCOUNTER
Call received from [facility] previously had followed for HH. [facility]

## 2024-04-11 ENCOUNTER — HOME CARE VISIT (OUTPATIENT)
Age: 79
End: 2024-04-11
Payer: MEDICARE

## 2024-04-11 PROCEDURE — G0299 HHS/HOSPICE OF RN EA 15 MIN: HCPCS

## 2024-04-12 ENCOUNTER — HOME CARE VISIT (OUTPATIENT)
Facility: HOME HEALTH | Age: 79
End: 2024-04-12
Payer: MEDICARE

## 2024-04-12 ENCOUNTER — HOME HEALTH/ HOSPICE FACE TO FACE (OUTPATIENT)
Age: 79
End: 2024-04-12

## 2024-04-12 VITALS — OXYGEN SATURATION: 89 % | SYSTOLIC BLOOD PRESSURE: 129 MMHG | DIASTOLIC BLOOD PRESSURE: 76 MMHG | HEART RATE: 97 BPM

## 2024-04-12 PROCEDURE — G0299 HHS/HOSPICE OF RN EA 15 MIN: HCPCS

## 2024-04-12 ASSESSMENT — ENCOUNTER SYMPTOMS
DYSPNEA ACTIVITY LEVEL: AT REST
COUGH: 1
PAIN LOCATION - PAIN QUALITY: ACHING
PAIN LOCATION - PAIN QUALITY: SHARP
ABDOMINAL PAIN: 1
COUGH CHARACTERISTICS: NON-PRODUCTIVE
DYSPNEA ACTIVITY LEVEL: AFTER AMBULATING LESS THAN 10 FT
COUGH CHARACTERISTICS: NON-PRODUCTIVE

## 2024-04-12 NOTE — HOSPICE
PRN visit made due to leaking catheter. Catheter successfully flushed with 30ml saline. Urine is dark nurys, slightly cloudy, wih no sediment noted. Pt stated she started having occasional sharp pain in lower abdomen today. RN advanced catheter approximately 3cm into urethra, then added 1.5ml saline into marcial balloon to increase to 10ml. Received order from Dr. Arthur for 10mg dicyclomine to be taken nightly at first, then up to three times daily as tolerated for bladder spasms. Reviewed medication with pt, DIL, and pt's friend. They verbalized understanding. RN called [facility] [place] ([phone]) for dicyclomine AND Bactrim (per Triage,  did no arrive at [facility] in time to  medication for delivery). [facility] staff stated meds to be ready around 8:30pm. RN called pt's son Keron to provide Rx update. Keron stated he will  meds tonight.

## 2024-04-12 NOTE — HOSPICE
[facility]   Good Help to Those in Need  [phone]     Patient Name:  Milly Morse  YOB: 1945         Date of Provider Hospice Visit: 04/12/24     Level of Care:   [] General Inpatient (GIP)              [x] Routine                   [] Respite     Current Location of Care: Home      Date of Original Hospice Admission:  4/6/2024  Hospice Medical Director at time of admission: Dr. Castro Goss     Principle Hospice Diagnosis:   Pulmonary fibrosis  Diagnoses RELATED to the terminal prognosis: Anxiety, RANGEL  Other Diagnoses: GERD, hypothyroidism     HOSPICE SUMMARY   Milly Morse is a 79 yo  female admitted to [facility] on routine level of care at home on 4/6/2024. Resides alone in private [facility]. Son Keron and KYLE Emelia Campoverde are currently staying with her and acting as primary caregivers    The patient's principle diagnosis has resulted in weakness/debility, fatigue, dyspnea with dependence on continuous oxygen 8-10L via NC, anorexia, and anxiety.      Functionally, the patient's Karnofsky and/or Palliative Performance Scale has is estimated at 40.     Objective information that support this patients limited prognosis includes: See note above.       The patient/family chose comfort measures with the support of Hospice.      HOSPICE DIAGNOSES   Active Symptoms:  1.  Pulmonary fibrosis  2.  Dyspnea  3.  Anxiety  4.  Anorexia  5.  GERD  6.  Hypothyroidism  7.  Hospice care      PLAN   Continue routine level of care at home  Reviewed hospice philosophy and goals of care with emphasis on comfort and safety; reviewed disease process/progression and physiological changes at end of life with son and KYLE; reviewed symptom management medications with indications and rationale for use  Continue Morphine 5 mg po/sl every 4 hours as needed for pain or SOB  Continue Lorazepam 0.5 mg po/sl every 4 hours as needed for management of anxiety  Singulair, Gabapentin, and Miralax

## 2024-04-13 NOTE — HOSPICE
Routine Sn joint visit with NP, Deepthi Covarrubias RN and NP met with patient's family, son, Keron and daughter in law, Emelia Campoverde  to discuss patient's current status and devise  a suitable plan of care for comfort   All medications were reviewed by NP with Emelia Campoverde   New plan to discontinue the singulair, start Prednisone 5 mg po daily, Oxybutynin 5 mg 2x daily, Ayr gel to nares prn dryness,   Senna 1 po daily prn constipation , decrease Morphine 20 mg/ml from 10 mg to 5 mg Q 4 hours prn pain / shortmess of breath   10 mg is too much per dtr in law  She sleeps continuously   NP provided detailed education for the son and dtr in law regarding disease process and care of the patient  All concerns addressed by NP   Patient is alert and oriented x3   Responds appropriately  NP completed assessment  Patient has marked fatigue and anxiety  Lorazepam 0.5 mg is taken once daily for anxiety   Per family she is sleeping approx 16 hours in a 24 hour period   Intake is very poor bites and sips only No appetite  Denies nausea   Patient has joint pain which Morphine is helpful   Breath sounds diminished with crackles throughout  Non productive cough frequently  Marcial catheter continues to leak and patient has some burning  NP discussed trying the ditropan first for possible bladder spasms and replace marcial as a last resort  Antibiotic has been ordered for possible UTI Bactrim    Patient has tenderness lower abdomen  To start senna for bowel regulation  No peripheral edema noted  + pedal pulses   Fracture bed pan ordered per request  Patient is unable to get up to bathroom related to weakness and fatigue, safety issue   All new medications ordered form [facility] to be delivered to the home this afternoon  Morphine also refilled 30 ml bottle   Informed son and spouse to call hospice 24/7 with concerns / needs  Understanding verbalized

## 2024-04-15 ENCOUNTER — HOME CARE VISIT (OUTPATIENT)
Facility: HOME HEALTH | Age: 79
End: 2024-04-15
Payer: MEDICARE

## 2024-04-15 ENCOUNTER — HOME CARE VISIT (OUTPATIENT)
Age: 79
End: 2024-04-15
Payer: MEDICARE

## 2024-04-15 VITALS
SYSTOLIC BLOOD PRESSURE: 106 MMHG | DIASTOLIC BLOOD PRESSURE: 64 MMHG | TEMPERATURE: 98.9 F | HEART RATE: 93 BPM | OXYGEN SATURATION: 90 % | RESPIRATION RATE: 20 BRPM

## 2024-04-15 PROCEDURE — G0156 HHCP-SVS OF AIDE,EA 15 MIN: HCPCS

## 2024-04-15 PROCEDURE — G0299 HHS/HOSPICE OF RN EA 15 MIN: HCPCS

## 2024-04-15 ASSESSMENT — ENCOUNTER SYMPTOMS
DYSPNEA ACTIVITY LEVEL: AT REST
PAIN LOCATION - PAIN QUALITY: ACHING
ABDOMINAL PAIN: 1
STOOL DESCRIPTION: LOOSE
COUGH: 1
DIARRHEA: 1
COUGH CHARACTERISTICS: PRODUCTIVE

## 2024-04-16 ENCOUNTER — HOME CARE VISIT (OUTPATIENT)
Age: 79
End: 2024-04-16
Payer: MEDICARE

## 2024-04-16 NOTE — HOSPICE
Routine hospice SN assessment visit  Patient received in her bed awake and alert  No S/S of acute pain or shortness of breath noted  Currently continues on oxygen 8 L via nasal cannula which is effective 02 sat=90 per cent   Skin is cool and dry, breath sounds clear, diminished upper lobes with crackles lower lobes  Continues with periodic cough, productive white sputum infrequentlu   Abdomen is soft and tender on both sides pelvic area  No peripheral edema noted Bilateral feet cool to touch, + pedal pulses    Fortune catheter is patent and draining dark tea colored urine, concentrated  Some leaking reported  Patient has just started on the Ditropan for bladder spasms  Will continue to monitor issue    Appetite is very poor with little intake  Has dry mouth   Denies having nausea  Has started on antibiotic, Bactrim,  for possible UTI which has caused diarrhea stools today  May stop taking medication if continues   Son is managing all of his mother's care this week  Requesting if possibke to have HHA more frequently for assistance   RN will discuss with supervisor   Patient reports that her heels are sore  Educated on floating heels on pillow and sure prep ordered to apply to heels daily for preventative measures   Patient is taking the Morphine, smaller dose, 0,25 ml ( 5 mg ) 3x daily which is effective in managing her pain   To continue with this hopsice plan of care  Informed son, Keron, to call hospice 24/7 with concerns / needs  Understanding verbalized

## 2024-04-16 NOTE — HOSPICE
provided PRN visit to patient and her son at her home per son's request for spiritual support.  encountered patient awake in her bed, seated upright, oxygen cannula in nose reporting being comfortable.   engaged patient and son in life review, spiritual review, legacy reflection and prayer.  assisted patient in articulation of wishes for her  service. Patient shared openly about her life history, her grief, her wishes and her sources of spiritual and emotional comfort. Son expressed gratitude for  visit.  will continue to offer spiritual care support to patient as she journeys toward end of life.

## 2024-04-17 ENCOUNTER — HOME CARE VISIT (OUTPATIENT)
Facility: HOME HEALTH | Age: 79
End: 2024-04-17
Payer: MEDICARE

## 2024-04-17 VITALS — RESPIRATION RATE: 16 BRPM

## 2024-04-17 PROCEDURE — G0156 HHCP-SVS OF AIDE,EA 15 MIN: HCPCS

## 2024-04-17 PROCEDURE — G0299 HHS/HOSPICE OF RN EA 15 MIN: HCPCS

## 2024-04-17 NOTE — HOSPICE
PRN visit today for leaking marcial. Son Keron present, pt in bed, PPS 40, palliated. On oxygen, noted to be dyspneic with talking and minimal exertion. Pt denies pain, stated she ate a few bites of food today after a long period of little intake. Discussed concerns over leaking catheter. Pt prefers not to remove and replace catheter. Page urine in bag, approx 300 ml, emptied.  Educated other option of repositioning catheter and adding water, which she agreed to. Advanced catheter higher into the bladder, added saline to balloon and helped reposition pt in bed. Leaking seems to have stopped. She is also on ditropan for spasm. Educated Milly and PCG that if leaking returns to call hospice for a nurse visit if needed. Meds on hand for comfort. Pt took a dose of lorazepam from her son and was planning to take a nap.

## 2024-04-19 ENCOUNTER — HOME CARE VISIT (OUTPATIENT)
Facility: HOME HEALTH | Age: 79
End: 2024-04-19
Payer: MEDICARE

## 2024-04-19 PROCEDURE — G0156 HHCP-SVS OF AIDE,EA 15 MIN: HCPCS

## 2024-04-19 PROCEDURE — G0299 HHS/HOSPICE OF RN EA 15 MIN: HCPCS

## 2024-04-20 VITALS
OXYGEN SATURATION: 91 % | TEMPERATURE: 98.6 F | SYSTOLIC BLOOD PRESSURE: 110 MMHG | HEART RATE: 97 BPM | RESPIRATION RATE: 24 BRPM | DIASTOLIC BLOOD PRESSURE: 68 MMHG

## 2024-04-20 ASSESSMENT — ENCOUNTER SYMPTOMS
CONSTIPATION: 1
DYSPNEA ACTIVITY LEVEL: AT REST
COUGH CHARACTERISTICS: NON-PRODUCTIVE
COUGH: 1

## 2024-04-20 NOTE — HOSPICE
Routine hospice SN assessment visit    Patient received in her bed with HOB elevated  Awake and alert  Responds appropriately however is forgetful    States she is having a good morning  Expecting some  girl friends who are visiting for lunch   No non verbal S/S of pain or acute shortness of breath  On continuous nasal 02 8L via nasal cannula  States the Morphine has helped her shortness of breath   Patient denies having pain this am  Taking the Morphine approx 5x  throughout day per son  He prefills syringes without issues  Takes Lorazepam very infrequently  Reports having given yesterday for increased Jitterness with good effect    Skin is cool and dry, breath sounds diminished upper lobes with crackles middle and lower lobes  Coughs non productively periodically  Abdomen soft and non tender + bowel sounds  No Bm for 4 days  She is no longer able to get OOB to bedside commode and is having much difficulty using fracture bedpan provided by hospice   Patient is not taking PRN senna   Instructed caregiver to give Miralax now and start giving the senna 2x daily scheduled  Order obtained from [facility]  Med ordered from  [facility] to be delivered to home this afternoon    No peripheral edema noted  Patient continues to float heels on pillow as instructed by RN   Son applying sure perp to bilateral heels  Skin intact warm to touch and, no discoloration    Fortune catheter patent and draining nurys hazy urine  Patient reports catheter is much better since taking Ditropan, not leaking  Does report some burning when she coughs   Intake remains poor however is drinking a lot of fluids throughout day water, popsicles, slushy drinks  States the oral rinse ordered has helped her dry mouth     To continue with current plan of care  Informed sonKeron to call hospice 24/7 with concerns / needs  Understanding verbalized     No med reills or supplies needed this visit  Son has enough Morphine and Lorazepam

## 2024-04-22 ENCOUNTER — HOME CARE VISIT (OUTPATIENT)
Facility: HOME HEALTH | Age: 79
End: 2024-04-22
Payer: MEDICARE

## 2024-04-22 PROCEDURE — G0156 HHCP-SVS OF AIDE,EA 15 MIN: HCPCS

## 2024-04-23 ENCOUNTER — HOME CARE VISIT (OUTPATIENT)
Age: 79
End: 2024-04-23
Payer: MEDICARE

## 2024-04-23 ENCOUNTER — HOME CARE VISIT (OUTPATIENT)
Facility: HOME HEALTH | Age: 79
End: 2024-04-23
Payer: MEDICARE

## 2024-04-23 VITALS
SYSTOLIC BLOOD PRESSURE: 138 MMHG | DIASTOLIC BLOOD PRESSURE: 82 MMHG | RESPIRATION RATE: 18 BRPM | HEART RATE: 82 BPM | TEMPERATURE: 98.6 F

## 2024-04-23 PROCEDURE — G0299 HHS/HOSPICE OF RN EA 15 MIN: HCPCS

## 2024-04-23 ASSESSMENT — ENCOUNTER SYMPTOMS
CONSTIPATION: 1
SKIN LESIONS: 1

## 2024-04-23 NOTE — HOSPICE
Arrived to patients home and house was napping. Had a great visit with  today. Oriented to all spheres. Lung sounds are with some wheezing upon exertion. Respirations  are even and nonlabored at rest and labored with use of accessory muscles with any exertion. Bowel sounds are active. Abdomen Nontender and soft. Appetite is variable. Pedal pulses are positive. Denies pain or discomfort. No nonverbal signs or symptoms of pain agitation or shortness of breath. Stag 2 pressure area noted to sacrum 3x2 cm. Cleansed with wound cleanser and triad applied and covered with optifoam. Family will change twice weekly and nursing once weekly. Discussed pressure relief.  Patient no longer wishes to take medication other than symptom relief medication and nexium. Np called and dc orders received. Son and daughter in law at visit. Fortune patent draining nurys colored urine in adequate amounts. States ditropan has helped with bladder spasms. Told to call hospice with questions concerns and changes in status.

## 2024-04-23 NOTE — HOSPICE
provided PRN visit to patient at her home per request of son.  was greeted by son and daughter-in-law at the door.  provided compassionate presence, active listening, healing, touch, prayer and song at bedside of patient. Patient visibly calmed and expressed feeling more peaceful at end of 's visit.  provided hospice education and active listening to family.  and son discerned that weekly visits would be beneficial to patient going forward.  will continue to offer spiritual care visits to patient and family for anticipatory grief and spiritual comfort.

## 2024-04-24 ENCOUNTER — HOME CARE VISIT (OUTPATIENT)
Facility: HOME HEALTH | Age: 79
End: 2024-04-24
Payer: MEDICARE

## 2024-04-24 PROCEDURE — G0156 HHCP-SVS OF AIDE,EA 15 MIN: HCPCS

## 2024-04-26 ENCOUNTER — HOME CARE VISIT (OUTPATIENT)
Facility: HOME HEALTH | Age: 79
End: 2024-04-26
Payer: MEDICARE

## 2024-04-26 PROCEDURE — G0156 HHCP-SVS OF AIDE,EA 15 MIN: HCPCS

## 2024-04-26 PROCEDURE — G0299 HHS/HOSPICE OF RN EA 15 MIN: HCPCS

## 2024-04-26 NOTE — HOSPICE
Symptom management visit. Arrived to home due to leaking Marcial. Deepthi Covarrubias NP consulted. Marcial had balloon checked at 10 ml and was was draining adequate amounts of concentrated urine. Patient is mobile in the bed and turning from side to side and able ot move legs. Daughter in law and son were upset that NP did not want to put in a new marcial and stated they would just use another hospice as many have called them about taking care of their Mom. Son stated he was calling one of them. Explained why NP did not want to change out marcial and explained the revocation process. Patient decided that she would like to stay with [facility] and would be willing to try recommendations from NP. Ditropan dosage was increased and marcial securement device was secured in place. Educated family on importance of keeping a device on. Will also order leg strap. Dressing changed to sacral are as ordered and healing well. Patients daughter in law stated that the dosage of morphine want holding the patient due to shortness of breath and the dosage was increased to 10 mg.o2 indcreased to 10l/min by family. Encouraed family to call hospice prior to adjusting medications.  Please see emar. [facility] to deliver today. Told to call hospice with questions, concerns and changes in status.

## 2024-04-30 NOTE — HOSPICE
provided PRN visit to patient at her home, per patient and family request for weekly visits, and was greeted by two close friends who are currently caring for her. Patient presented in her bed, appearing weak and groggy.  visited at bedside providing prayer, compassionate presence, and spiritual readings. Patient was in and out of wakefulness and expressed thanks for  visit.  called and spoke to patient's son Keron reporting on visit.  will continue to visit weekly for spiritual comfort and peace as patient journeys toward end of life.

## 2024-05-01 PROBLEM — J84.10 PULMONARY FIBROSIS (HCC): Status: ACTIVE | Noted: 2024-01-01

## 2024-05-01 PROBLEM — R45.1 RESTLESSNESS: Status: ACTIVE | Noted: 2024-01-01

## 2024-05-01 PROBLEM — R06.03 RESPIRATORY DISTRESS: Status: ACTIVE | Noted: 2024-01-01

## 2024-05-01 NOTE — HOSPICE
Arrived at room and patient was in bed with caregiver at side. She was short of breath with accessory muscle use and respirations at 40. Deepthi Covarrubias NP called and lorazepam dose/frequency was increased and morphine frequency was increased. She was medicated with these at 1215 and 1315 with some improvement in symptoms each time. Primary caregivers, son and daughter in law, are in [place] and unable to return until Sunday. Requesting respite as caregivers are becoming uncomfortable taking care of her needs. Oriented to person and place.  Bed secured at hospice house. Transport will  at 2pm. DNR and medications with belongings to go to hospice house.  Accepted bedside prayer. Bowel sounds hypoactive. Abdomen soft and nontender. No food since Friday or Saturday. patient is drinking fluids. Pedal pulses TIps of fingers bluish in color and unable to get pulse ox reading. o2 intact at 10 liters. son and daughter in law updated. Education related to end of life care performed. Report called to [facility]. Told to call hospice with question, concerns, or change in status.

## 2024-05-02 NOTE — HOSPICE
This  provided patient with routine visit at [facility] during her respite stay.  visited at bedside of patient with one of patients friends. Patient was minimally responsive to  briefly rousing, but maintaining closed eyes.  engaged with friend in life review of patient engaging with her in reminiscing about favorite memories of patient.  provided pastoral prayer.  called and provided emotional support to patient's son over the phone. Son expressed gratitude for 's support and visit to patient.  will continue to offer spiritual support to patient and family for spiritual peace, comfort, and anticipatory grief.

## 2024-05-05 ENCOUNTER — HOME CARE VISIT (OUTPATIENT)
Age: 79
End: 2024-05-05
Payer: MEDICARE

## 2024-05-06 ENCOUNTER — HOME CARE VISIT (OUTPATIENT)
Age: 79
End: 2024-05-06
Payer: MEDICARE

## 2024-05-11 ENCOUNTER — HOME CARE VISIT (OUTPATIENT)
Age: 79
End: 2024-05-11
Payer: MEDICARE

## (undated) DEVICE — TUBING HYDR IRR --